# Patient Record
Sex: MALE | Race: OTHER | URBAN - METROPOLITAN AREA
[De-identification: names, ages, dates, MRNs, and addresses within clinical notes are randomized per-mention and may not be internally consistent; named-entity substitution may affect disease eponyms.]

---

## 2017-05-06 ENCOUNTER — EMERGENCY (EMERGENCY)
Facility: HOSPITAL | Age: 77
LOS: 0 days | Discharge: HOME | End: 2017-05-06

## 2017-06-28 DIAGNOSIS — Z79.899 OTHER LONG TERM (CURRENT) DRUG THERAPY: ICD-10-CM

## 2017-06-28 DIAGNOSIS — Z79.84 LONG TERM (CURRENT) USE OF ORAL HYPOGLYCEMIC DRUGS: ICD-10-CM

## 2017-06-28 DIAGNOSIS — J06.9 ACUTE UPPER RESPIRATORY INFECTION, UNSPECIFIED: ICD-10-CM

## 2017-06-28 DIAGNOSIS — E11.9 TYPE 2 DIABETES MELLITUS WITHOUT COMPLICATIONS: ICD-10-CM

## 2017-06-28 DIAGNOSIS — R05 COUGH: ICD-10-CM

## 2017-06-28 DIAGNOSIS — I10 ESSENTIAL (PRIMARY) HYPERTENSION: ICD-10-CM

## 2017-07-09 ENCOUNTER — EMERGENCY (EMERGENCY)
Facility: HOSPITAL | Age: 77
LOS: 0 days | Discharge: HOME | End: 2017-07-09

## 2017-07-09 DIAGNOSIS — Z79.84 LONG TERM (CURRENT) USE OF ORAL HYPOGLYCEMIC DRUGS: ICD-10-CM

## 2017-07-09 DIAGNOSIS — Z79.899 OTHER LONG TERM (CURRENT) DRUG THERAPY: ICD-10-CM

## 2017-07-09 DIAGNOSIS — L02.811 CUTANEOUS ABSCESS OF HEAD [ANY PART, EXCEPT FACE]: ICD-10-CM

## 2017-07-09 DIAGNOSIS — I10 ESSENTIAL (PRIMARY) HYPERTENSION: ICD-10-CM

## 2017-07-09 DIAGNOSIS — L73.9 FOLLICULAR DISORDER, UNSPECIFIED: ICD-10-CM

## 2017-07-09 DIAGNOSIS — E11.9 TYPE 2 DIABETES MELLITUS WITHOUT COMPLICATIONS: ICD-10-CM

## 2017-08-12 ENCOUNTER — EMERGENCY (EMERGENCY)
Facility: HOSPITAL | Age: 77
LOS: 0 days | Discharge: HOME | End: 2017-08-12

## 2017-08-12 DIAGNOSIS — Z79.84 LONG TERM (CURRENT) USE OF ORAL HYPOGLYCEMIC DRUGS: ICD-10-CM

## 2017-08-12 DIAGNOSIS — Z79.899 OTHER LONG TERM (CURRENT) DRUG THERAPY: ICD-10-CM

## 2017-08-12 DIAGNOSIS — L29.9 PRURITUS, UNSPECIFIED: ICD-10-CM

## 2017-08-12 DIAGNOSIS — I10 ESSENTIAL (PRIMARY) HYPERTENSION: ICD-10-CM

## 2017-08-12 DIAGNOSIS — E11.9 TYPE 2 DIABETES MELLITUS WITHOUT COMPLICATIONS: ICD-10-CM

## 2017-10-11 ENCOUNTER — EMERGENCY (EMERGENCY)
Facility: HOSPITAL | Age: 77
LOS: 0 days | Discharge: HOME | End: 2017-10-11

## 2017-10-11 DIAGNOSIS — M25.562 PAIN IN LEFT KNEE: ICD-10-CM

## 2017-10-11 DIAGNOSIS — E11.9 TYPE 2 DIABETES MELLITUS WITHOUT COMPLICATIONS: ICD-10-CM

## 2017-10-11 DIAGNOSIS — Z79.84 LONG TERM (CURRENT) USE OF ORAL HYPOGLYCEMIC DRUGS: ICD-10-CM

## 2017-10-11 DIAGNOSIS — Z79.899 OTHER LONG TERM (CURRENT) DRUG THERAPY: ICD-10-CM

## 2017-10-11 DIAGNOSIS — G89.29 OTHER CHRONIC PAIN: ICD-10-CM

## 2017-10-11 DIAGNOSIS — I10 ESSENTIAL (PRIMARY) HYPERTENSION: ICD-10-CM

## 2018-01-18 ENCOUNTER — EMERGENCY (EMERGENCY)
Facility: HOSPITAL | Age: 78
LOS: 0 days | Discharge: HOME | End: 2018-01-18

## 2018-01-18 DIAGNOSIS — E11.9 TYPE 2 DIABETES MELLITUS WITHOUT COMPLICATIONS: ICD-10-CM

## 2018-01-18 DIAGNOSIS — Z79.84 LONG TERM (CURRENT) USE OF ORAL HYPOGLYCEMIC DRUGS: ICD-10-CM

## 2018-01-18 DIAGNOSIS — Z76.0 ENCOUNTER FOR ISSUE OF REPEAT PRESCRIPTION: ICD-10-CM

## 2018-01-18 DIAGNOSIS — Z79.899 OTHER LONG TERM (CURRENT) DRUG THERAPY: ICD-10-CM

## 2018-01-18 DIAGNOSIS — I10 ESSENTIAL (PRIMARY) HYPERTENSION: ICD-10-CM

## 2018-04-17 ENCOUNTER — EMERGENCY (EMERGENCY)
Facility: HOSPITAL | Age: 78
LOS: 0 days | Discharge: HOME | End: 2018-04-17
Admitting: PHYSICIAN ASSISTANT

## 2018-04-17 VITALS
OXYGEN SATURATION: 97 % | HEART RATE: 69 BPM | DIASTOLIC BLOOD PRESSURE: 76 MMHG | TEMPERATURE: 98 F | SYSTOLIC BLOOD PRESSURE: 160 MMHG | RESPIRATION RATE: 19 BRPM

## 2018-04-17 DIAGNOSIS — M25.511 PAIN IN RIGHT SHOULDER: ICD-10-CM

## 2018-04-17 DIAGNOSIS — M75.81 OTHER SHOULDER LESIONS, RIGHT SHOULDER: ICD-10-CM

## 2018-04-17 DIAGNOSIS — I10 ESSENTIAL (PRIMARY) HYPERTENSION: ICD-10-CM

## 2018-04-17 DIAGNOSIS — E11.9 TYPE 2 DIABETES MELLITUS WITHOUT COMPLICATIONS: ICD-10-CM

## 2018-04-17 RX ORDER — METHOCARBAMOL 500 MG/1
1 TABLET, FILM COATED ORAL
Qty: 30 | Refills: 0 | OUTPATIENT
Start: 2018-04-17 | End: 2018-04-26

## 2018-04-17 RX ORDER — ACETAMINOPHEN 500 MG
975 TABLET ORAL ONCE
Qty: 0 | Refills: 0 | Status: COMPLETED | OUTPATIENT
Start: 2018-04-17 | End: 2018-04-17

## 2018-04-17 RX ORDER — KETOROLAC TROMETHAMINE 30 MG/ML
30 SYRINGE (ML) INJECTION ONCE
Qty: 0 | Refills: 0 | Status: DISCONTINUED | OUTPATIENT
Start: 2018-04-17 | End: 2018-04-17

## 2018-04-17 RX ADMIN — Medication 30 MILLIGRAM(S): at 06:20

## 2018-04-17 RX ADMIN — Medication 975 MILLIGRAM(S): at 06:20

## 2018-04-17 NOTE — ED PROVIDER NOTE - OBJECTIVE STATEMENT
76 yo male hx of HTN/DM present c/o right shoulder and upper arm pain worsening over the past 2 days after he was digging 3 holes with a post digger 3 days ago. patient states he didn't feel much pain at that time and he was able to go to gym to work out. pain worsening last night so he comes to ED for evaluation. + tingling/burning sensation down to right hand. pain worsen with movement. didn't take any meds at home for pain. denies fever/chill/chest pain/sob/abd pain/n/v/d.

## 2018-04-17 NOTE — ED PROVIDER NOTE - MUSCULOSKELETAL, MLM
+ ttp over the anterior shoulder with painful flexion/external rotation over 45 degree. right elbow/wrist and hand with FROM. 2+ radial pulses. right hand n/v intact

## 2018-06-08 ENCOUNTER — EMERGENCY (EMERGENCY)
Facility: HOSPITAL | Age: 78
LOS: 0 days | Discharge: HOME | End: 2018-06-08
Admitting: PHYSICIAN ASSISTANT

## 2018-06-08 VITALS
OXYGEN SATURATION: 99 % | TEMPERATURE: 98 F | DIASTOLIC BLOOD PRESSURE: 83 MMHG | RESPIRATION RATE: 16 BRPM | HEART RATE: 64 BPM | SYSTOLIC BLOOD PRESSURE: 173 MMHG

## 2018-06-08 DIAGNOSIS — R21 RASH AND OTHER NONSPECIFIC SKIN ERUPTION: ICD-10-CM

## 2018-06-08 DIAGNOSIS — Z79.891 LONG TERM (CURRENT) USE OF OPIATE ANALGESIC: ICD-10-CM

## 2018-06-08 DIAGNOSIS — I10 ESSENTIAL (PRIMARY) HYPERTENSION: ICD-10-CM

## 2018-06-08 DIAGNOSIS — Z96.652 PRESENCE OF LEFT ARTIFICIAL KNEE JOINT: Chronic | ICD-10-CM

## 2018-06-08 DIAGNOSIS — Z79.1 LONG TERM (CURRENT) USE OF NON-STEROIDAL ANTI-INFLAMMATORIES (NSAID): ICD-10-CM

## 2018-06-08 DIAGNOSIS — L29.8 OTHER PRURITUS: ICD-10-CM

## 2018-06-08 DIAGNOSIS — Z79.84 LONG TERM (CURRENT) USE OF ORAL HYPOGLYCEMIC DRUGS: ICD-10-CM

## 2018-06-08 DIAGNOSIS — Z79.899 OTHER LONG TERM (CURRENT) DRUG THERAPY: ICD-10-CM

## 2018-06-08 DIAGNOSIS — E11.9 TYPE 2 DIABETES MELLITUS WITHOUT COMPLICATIONS: ICD-10-CM

## 2018-06-08 DIAGNOSIS — Z96.652 PRESENCE OF LEFT ARTIFICIAL KNEE JOINT: ICD-10-CM

## 2018-06-08 NOTE — ED PROVIDER NOTE - PROGRESS NOTE DETAILS
Patient states he has taken steroids before even with being a diabetic.  Patient understands the risk of steroid use and being a diabetic.  Patient is aware he needs to monitor his BS daily.  Patient aware to follow up with PMD on Monday.  Patient aware to hold use of steroid if blood sugar is high. Patient advised to return to the ED if sugar is elevated, if rash worsens, or if any new, worsening, or concerning symptoms. Patients advised to take over the counter Benadryl if rash worsens or has difficulty breathing.

## 2018-06-08 NOTE — ED PROVIDER NOTE - PHYSICAL EXAMINATION
GENERAL: NAD, well-developed  ENT: Airway clear. No pharyngeal erythema or exudates. Uvula midline. No tongue swelling.  CHEST/LUNG: Clear to auscultation bilaterally; No wheeze, rhonchi, or rales  CARDIAC: Normal S1 and S2  EXTREMITIES:  Radial pulses present B/L.  PSYCH: AAOx3, cooperative, appropriate   SKIN: + multiple, macular, erythematous insect bites located on upper and lower extremities . GENERAL: NAD, well-developed  ENT: Airway clear. No pharyngeal erythema or exudates. Uvula midline. No tongue swelling.  CHEST/LUNG: Clear to auscultation bilaterally; No wheeze, rhonchi, or rales  CARDIAC: Normal S1 and S2  EXTREMITIES:  Radial pulses present B/L.  PSYCH: AAOx3, cooperative, appropriate   SKIN: + multiple, small, macular, erythematous insect bites located on upper and lower extremities. No visible excoriations in finger webs. No target signs. No signs of infection

## 2018-06-08 NOTE — ED PROVIDER NOTE - NS ED ROS FT
CONSTITUTIONAL: No weakness, fevers, or chills  NECK: No pain or stiffness  RESPIRATORY: No shortness of breath  CARDIOVASCULAR: No chest pain or palpitations  GASTROINTESTINAL: No abdominal or epigastric pain. No nausea or vomiting  NEUROLOGICAL: No numbness or weakness  SKIN: + insect bites on arms and legs

## 2018-06-08 NOTE — ED PROVIDER NOTE - OBJECTIVE STATEMENT
76 yo male with PMH of HTN, DM presents to the ED c/o pruritic, insect bites from when he was mowing his lawn 4 days ago. Patient denies fever, chills, exposure to poison ivy, or SOB.

## 2018-06-08 NOTE — ED PROVIDER NOTE - DIAGNOSIS COUNSELING, MDM
conducted a detailed discussion... Patient to be discharged from ED.  Verbal instructions given, including instructions to return to ED immediately for any new, worsening, or concerning symptoms. Patient endorsed understanding. Written discharge instructions additionally given, including follow-up plan.

## 2018-07-23 ENCOUNTER — EMERGENCY (EMERGENCY)
Facility: HOSPITAL | Age: 78
LOS: 0 days | Discharge: HOME | End: 2018-07-24
Attending: EMERGENCY MEDICINE | Admitting: EMERGENCY MEDICINE

## 2018-07-23 DIAGNOSIS — I10 ESSENTIAL (PRIMARY) HYPERTENSION: ICD-10-CM

## 2018-07-23 DIAGNOSIS — Z79.52 LONG TERM (CURRENT) USE OF SYSTEMIC STEROIDS: ICD-10-CM

## 2018-07-23 DIAGNOSIS — Z79.01 LONG TERM (CURRENT) USE OF ANTICOAGULANTS: ICD-10-CM

## 2018-07-23 DIAGNOSIS — R21 RASH AND OTHER NONSPECIFIC SKIN ERUPTION: ICD-10-CM

## 2018-07-23 DIAGNOSIS — Z79.84 LONG TERM (CURRENT) USE OF ORAL HYPOGLYCEMIC DRUGS: ICD-10-CM

## 2018-07-23 DIAGNOSIS — E11.9 TYPE 2 DIABETES MELLITUS WITHOUT COMPLICATIONS: ICD-10-CM

## 2018-07-23 DIAGNOSIS — Z96.652 PRESENCE OF LEFT ARTIFICIAL KNEE JOINT: Chronic | ICD-10-CM

## 2018-07-23 DIAGNOSIS — L29.9 PRURITUS, UNSPECIFIED: ICD-10-CM

## 2018-07-23 DIAGNOSIS — Z79.899 OTHER LONG TERM (CURRENT) DRUG THERAPY: ICD-10-CM

## 2018-07-23 DIAGNOSIS — L53.9 ERYTHEMATOUS CONDITION, UNSPECIFIED: ICD-10-CM

## 2018-07-24 VITALS
DIASTOLIC BLOOD PRESSURE: 72 MMHG | TEMPERATURE: 98 F | SYSTOLIC BLOOD PRESSURE: 143 MMHG | RESPIRATION RATE: 18 BRPM | OXYGEN SATURATION: 96 % | HEART RATE: 61 BPM

## 2018-07-24 PROBLEM — E11.9 TYPE 2 DIABETES MELLITUS WITHOUT COMPLICATIONS: Chronic | Status: ACTIVE | Noted: 2018-04-17

## 2018-07-24 PROBLEM — I10 ESSENTIAL (PRIMARY) HYPERTENSION: Chronic | Status: ACTIVE | Noted: 2018-04-17

## 2018-07-24 RX ORDER — PERMETHRIN CREAM 5% W/W 50 MG/G
2 CREAM TOPICAL
Qty: 1 | Refills: 0 | OUTPATIENT
Start: 2018-07-24 | End: 2018-07-24

## 2018-07-24 NOTE — ED PROVIDER NOTE - OBJECTIVE STATEMENT
77 yo M reports pruritic rash with few scattered papular rashes with excoriations. Pt. reports that he noticed pruritic isolated papules 3 d ago, pt. applied hydrocortisone to the area with no relief. Additionally pt. tried taking benadryl PO that provided no relief. Denies fevers, riggors, N/V, CP, SOB, abd. pain, desquamating rash.    I have reviewed available current nursing and previous documentation of past medical, surgical, family, and/or social history.

## 2018-07-24 NOTE — ED PROVIDER NOTE - PHYSICAL EXAMINATION
Physical Exam    Vital Signs: I have reviewed the initial vital signs.  Constitutional: well-nourished, appears stated age, no acute distress  Eyes: PERRLA, EOM intact, no conjunctival injection, and symmetrical lids.  ENT: Neck supple with no adenopathy, moist MM, no involvement of MM.  Respiratory: unlabored respiratory effort, clear to auscultation bilaterally  Gastrointestinal: soft, non-tender abdomen, no pulsatile mass  Musculoskeletal: supple neck, no lower extremity edema  Integumentary: Discrete and isolated papules with multiple surrounding excoriation on extremities and face. Warm, dry, no rash.

## 2018-07-24 NOTE — ED PROVIDER NOTE - ATTENDING CONTRIBUTION TO CARE
I personally evaluated the patient. I reviewed the Resident’s or Physician Assistant’s note (as assigned above), and agree with the findings and plan except as documented in my note.  79 yo M with DM presents to Ed with pruritus for 3 days over arms and face. No new detergents. NO change in meals. No prior history of rash. No close contacts with similar symptoms. No abdominal pain. No change in color of skin or urine PE: well-appearing, non toxic, + multiple areas of erythema on bilateral forearms (bug bite appearing), no scleral icterus, no sublingual jaundice, no oral or mucosal involvement

## 2018-07-24 NOTE — ED PROVIDER NOTE - MEDICAL DECISION MAKING DETAILS
Possible reaction to bug bites, permethrin given and instructed to follow up with PMD and dermatology

## 2018-07-24 NOTE — ED PROVIDER NOTE - NS ED ROS FT
Review of Systems    Constitutional: (-) fever (-) weakness   Eyes: (-) change in vision (-) photophobia (-) eye pain  ENT: (-) sore throat (-) nasal discharge  Cardiovascular: (-) chest pain  (-) palpitations  Respiratory: (-) SOB (-) cough   GI: (-) abdominal pain (-) N/V   Integumentary: (-) redness

## 2018-12-09 ENCOUNTER — EMERGENCY (EMERGENCY)
Facility: HOSPITAL | Age: 78
LOS: 0 days | Discharge: LEFT BEFORE DISCHARGE | End: 2018-12-09
Admitting: EMERGENCY MEDICINE

## 2018-12-09 DIAGNOSIS — J18.9 PNEUMONIA, UNSPECIFIED ORGANISM: ICD-10-CM

## 2018-12-09 DIAGNOSIS — R11.0 NAUSEA: ICD-10-CM

## 2018-12-09 DIAGNOSIS — I10 ESSENTIAL (PRIMARY) HYPERTENSION: ICD-10-CM

## 2018-12-09 DIAGNOSIS — J02.9 ACUTE PHARYNGITIS, UNSPECIFIED: ICD-10-CM

## 2018-12-09 DIAGNOSIS — Z96.652 PRESENCE OF LEFT ARTIFICIAL KNEE JOINT: Chronic | ICD-10-CM

## 2018-12-09 DIAGNOSIS — E11.9 TYPE 2 DIABETES MELLITUS WITHOUT COMPLICATIONS: ICD-10-CM

## 2018-12-09 DIAGNOSIS — R51 HEADACHE: ICD-10-CM

## 2018-12-13 ENCOUNTER — TRANSCRIPTION ENCOUNTER (OUTPATIENT)
Age: 78
End: 2018-12-13

## 2018-12-13 ENCOUNTER — INPATIENT (INPATIENT)
Facility: HOSPITAL | Age: 78
LOS: 0 days | Discharge: HOME | End: 2018-12-13
Attending: HOSPITALIST | Admitting: HOSPITALIST

## 2018-12-13 VITALS
SYSTOLIC BLOOD PRESSURE: 124 MMHG | TEMPERATURE: 98 F | HEART RATE: 66 BPM | RESPIRATION RATE: 18 BRPM | DIASTOLIC BLOOD PRESSURE: 68 MMHG | OXYGEN SATURATION: 97 %

## 2018-12-13 VITALS
OXYGEN SATURATION: 97 % | DIASTOLIC BLOOD PRESSURE: 77 MMHG | RESPIRATION RATE: 20 BRPM | SYSTOLIC BLOOD PRESSURE: 146 MMHG | HEIGHT: 66 IN | WEIGHT: 160.06 LBS | HEART RATE: 69 BPM | TEMPERATURE: 98 F

## 2018-12-13 DIAGNOSIS — Z96.652 PRESENCE OF LEFT ARTIFICIAL KNEE JOINT: Chronic | ICD-10-CM

## 2018-12-13 LAB
ALBUMIN SERPL ELPH-MCNC: 3.8 G/DL — SIGNIFICANT CHANGE UP (ref 3.5–5.2)
ALP SERPL-CCNC: 62 U/L — SIGNIFICANT CHANGE UP (ref 30–115)
ALT FLD-CCNC: 16 U/L — SIGNIFICANT CHANGE UP (ref 0–41)
ANION GAP SERPL CALC-SCNC: 17 MMOL/L — HIGH (ref 7–14)
AST SERPL-CCNC: 14 U/L — SIGNIFICANT CHANGE UP (ref 0–41)
BASOPHILS # BLD AUTO: 0.06 K/UL — SIGNIFICANT CHANGE UP (ref 0–0.2)
BASOPHILS NFR BLD AUTO: 0.7 % — SIGNIFICANT CHANGE UP (ref 0–1)
BILIRUB SERPL-MCNC: 0.2 MG/DL — SIGNIFICANT CHANGE UP (ref 0.2–1.2)
BUN SERPL-MCNC: 16 MG/DL — SIGNIFICANT CHANGE UP (ref 10–20)
CALCIUM SERPL-MCNC: 9.4 MG/DL — SIGNIFICANT CHANGE UP (ref 8.5–10.1)
CHLORIDE SERPL-SCNC: 100 MMOL/L — SIGNIFICANT CHANGE UP (ref 98–110)
CO2 SERPL-SCNC: 22 MMOL/L — SIGNIFICANT CHANGE UP (ref 17–32)
CREAT SERPL-MCNC: 0.8 MG/DL — SIGNIFICANT CHANGE UP (ref 0.7–1.5)
EOSINOPHIL # BLD AUTO: 0.36 K/UL — SIGNIFICANT CHANGE UP (ref 0–0.7)
EOSINOPHIL NFR BLD AUTO: 4.1 % — SIGNIFICANT CHANGE UP (ref 0–8)
GLUCOSE SERPL-MCNC: 227 MG/DL — HIGH (ref 70–99)
HCT VFR BLD CALC: 41.2 % — LOW (ref 42–52)
HGB BLD-MCNC: 13.9 G/DL — LOW (ref 14–18)
IMM GRANULOCYTES NFR BLD AUTO: 0.7 % — HIGH (ref 0.1–0.3)
LACTATE BLDV-MCNC: 2.2 MMOL/L — HIGH (ref 0.5–1.6)
LACTATE SERPL-SCNC: 1.5 MMOL/L — SIGNIFICANT CHANGE UP (ref 0.5–2.2)
LACTATE SERPL-SCNC: 2.6 MMOL/L — HIGH (ref 0.5–2.2)
LYMPHOCYTES # BLD AUTO: 2.09 K/UL — SIGNIFICANT CHANGE UP (ref 1.2–3.4)
LYMPHOCYTES # BLD AUTO: 23.6 % — SIGNIFICANT CHANGE UP (ref 20.5–51.1)
MAGNESIUM SERPL-MCNC: 1.7 MG/DL — LOW (ref 1.8–2.4)
MCHC RBC-ENTMCNC: 27.6 PG — SIGNIFICANT CHANGE UP (ref 27–31)
MCHC RBC-ENTMCNC: 33.7 G/DL — SIGNIFICANT CHANGE UP (ref 32–37)
MCV RBC AUTO: 81.7 FL — SIGNIFICANT CHANGE UP (ref 80–94)
MONOCYTES # BLD AUTO: 0.67 K/UL — HIGH (ref 0.1–0.6)
MONOCYTES NFR BLD AUTO: 7.6 % — SIGNIFICANT CHANGE UP (ref 1.7–9.3)
NEUTROPHILS # BLD AUTO: 5.63 K/UL — SIGNIFICANT CHANGE UP (ref 1.4–6.5)
NEUTROPHILS NFR BLD AUTO: 63.3 % — SIGNIFICANT CHANGE UP (ref 42.2–75.2)
NRBC # BLD: 0 /100 WBCS — SIGNIFICANT CHANGE UP (ref 0–0)
PLATELET # BLD AUTO: 368 K/UL — SIGNIFICANT CHANGE UP (ref 130–400)
POTASSIUM SERPL-MCNC: 4.1 MMOL/L — SIGNIFICANT CHANGE UP (ref 3.5–5)
POTASSIUM SERPL-SCNC: 4.1 MMOL/L — SIGNIFICANT CHANGE UP (ref 3.5–5)
PROT SERPL-MCNC: 6.8 G/DL — SIGNIFICANT CHANGE UP (ref 6–8)
RBC # BLD: 5.04 M/UL — SIGNIFICANT CHANGE UP (ref 4.7–6.1)
RBC # FLD: 12.4 % — SIGNIFICANT CHANGE UP (ref 11.5–14.5)
SODIUM SERPL-SCNC: 139 MMOL/L — SIGNIFICANT CHANGE UP (ref 135–146)
WBC # BLD: 8.87 K/UL — SIGNIFICANT CHANGE UP (ref 4.8–10.8)
WBC # FLD AUTO: 8.87 K/UL — SIGNIFICANT CHANGE UP (ref 4.8–10.8)

## 2018-12-13 RX ORDER — METFORMIN HYDROCHLORIDE 850 MG/1
0 TABLET ORAL
Qty: 0 | Refills: 0 | COMMUNITY

## 2018-12-13 RX ORDER — OXYCODONE HYDROCHLORIDE 5 MG/1
0 TABLET ORAL
Qty: 0 | Refills: 0 | COMMUNITY

## 2018-12-13 RX ORDER — AZITHROMYCIN 500 MG/1
500 TABLET, FILM COATED ORAL DAILY
Qty: 0 | Refills: 0 | Status: DISCONTINUED | OUTPATIENT
Start: 2018-12-14 | End: 2018-12-13

## 2018-12-13 RX ORDER — AMLODIPINE BESYLATE 2.5 MG/1
10 TABLET ORAL DAILY
Qty: 0 | Refills: 0 | Status: DISCONTINUED | OUTPATIENT
Start: 2018-12-13 | End: 2018-12-13

## 2018-12-13 RX ORDER — MAGNESIUM SULFATE 500 MG/ML
2 VIAL (ML) INJECTION ONCE
Qty: 0 | Refills: 0 | Status: COMPLETED | OUTPATIENT
Start: 2018-12-13 | End: 2018-12-13

## 2018-12-13 RX ORDER — CEFTRIAXONE 500 MG/1
1 INJECTION, POWDER, FOR SOLUTION INTRAMUSCULAR; INTRAVENOUS EVERY 24 HOURS
Qty: 0 | Refills: 0 | Status: DISCONTINUED | OUTPATIENT
Start: 2018-12-14 | End: 2018-12-13

## 2018-12-13 RX ORDER — CHLORHEXIDINE GLUCONATE 213 G/1000ML
1 SOLUTION TOPICAL
Qty: 0 | Refills: 0 | Status: DISCONTINUED | OUTPATIENT
Start: 2018-12-13 | End: 2018-12-13

## 2018-12-13 RX ORDER — AZITHROMYCIN 500 MG/1
500 TABLET, FILM COATED ORAL ONCE
Qty: 0 | Refills: 0 | Status: COMPLETED | OUTPATIENT
Start: 2018-12-13 | End: 2018-12-13

## 2018-12-13 RX ORDER — GABAPENTIN 400 MG/1
1 CAPSULE ORAL
Qty: 0 | Refills: 0 | COMMUNITY

## 2018-12-13 RX ORDER — ENOXAPARIN SODIUM 100 MG/ML
40 INJECTION SUBCUTANEOUS DAILY
Qty: 0 | Refills: 0 | Status: DISCONTINUED | OUTPATIENT
Start: 2018-12-13 | End: 2018-12-13

## 2018-12-13 RX ORDER — AMLODIPINE BESYLATE 2.5 MG/1
0 TABLET ORAL
Qty: 0 | Refills: 0 | COMMUNITY

## 2018-12-13 RX ORDER — SODIUM CHLORIDE 9 MG/ML
1000 INJECTION INTRAMUSCULAR; INTRAVENOUS; SUBCUTANEOUS ONCE
Qty: 0 | Refills: 0 | Status: COMPLETED | OUTPATIENT
Start: 2018-12-13 | End: 2018-12-13

## 2018-12-13 RX ORDER — CEFTRIAXONE 500 MG/1
1 INJECTION, POWDER, FOR SOLUTION INTRAMUSCULAR; INTRAVENOUS EVERY 24 HOURS
Qty: 0 | Refills: 0 | Status: DISCONTINUED | OUTPATIENT
Start: 2018-12-13 | End: 2018-12-13

## 2018-12-13 RX ORDER — GABAPENTIN 400 MG/1
600 CAPSULE ORAL DAILY
Qty: 0 | Refills: 0 | Status: DISCONTINUED | OUTPATIENT
Start: 2018-12-13 | End: 2018-12-13

## 2018-12-13 RX ADMIN — CEFTRIAXONE 100 GRAM(S): 500 INJECTION, POWDER, FOR SOLUTION INTRAMUSCULAR; INTRAVENOUS at 06:55

## 2018-12-13 RX ADMIN — ENOXAPARIN SODIUM 40 MILLIGRAM(S): 100 INJECTION SUBCUTANEOUS at 12:45

## 2018-12-13 RX ADMIN — AMLODIPINE BESYLATE 10 MILLIGRAM(S): 2.5 TABLET ORAL at 14:35

## 2018-12-13 RX ADMIN — GABAPENTIN 600 MILLIGRAM(S): 400 CAPSULE ORAL at 12:45

## 2018-12-13 RX ADMIN — AZITHROMYCIN 255 MILLIGRAM(S): 500 TABLET, FILM COATED ORAL at 09:13

## 2018-12-13 RX ADMIN — Medication 50 GRAM(S): at 14:17

## 2018-12-13 RX ADMIN — SODIUM CHLORIDE 250 MILLILITER(S): 9 INJECTION INTRAMUSCULAR; INTRAVENOUS; SUBCUTANEOUS at 14:16

## 2018-12-13 RX ADMIN — Medication 20 MILLIGRAM(S): at 14:35

## 2018-12-13 NOTE — H&P ADULT - NSHPLABSRESULTS_GEN_ALL_CORE
Allergies    No Known Allergies    Intolerances        T(F): 97.2 (12-13-18 @ 07:53), Max: 97.8 (12-13-18 @ 02:33)  HR: 62 (12-13-18 @ 07:53) (62 - 69)  BP: 132/67 (12-13-18 @ 07:53) (132/67 - 146/77)  BP(mean): --  RR: 18 (12-13-18 @ 07:53) (18 - 20)  SpO2: 97% (12-13-18 @ 07:53) (97% - 97%)    12-13    139  |  100  |  16  ----------------------------<  227<H>  4.1   |  22  |  0.8    Ca    9.4      13 Dec 2018 03:24  Mg     1.7     12-13    TPro  6.8  /  Alb  3.8  /  TBili  0.2  /  DBili  x   /  AST  14  /  ALT  16  /  AlkPhos  62  12-13                            13.9   8.87  )-----------( 368      ( 13 Dec 2018 03:24 )             41.2       Lactate Trend  12-13 @ 03:24 Lactate:2.6   12-09 @ 01:52 Lactate:2.2             CAPILLARY BLOOD GLUCOSE          < from: Xray Chest 2 Views PA/Lat (12.13.18 @ 04:35) >    Opacities in the right hilar and right upper/midlung zones. Findings may   be infectious/inflammatory in etiology however short interval follow-up   after treatment is recommended to demonstrate resolution and exclude   underlying neoplasm.    < end of copied text >

## 2018-12-13 NOTE — ED PROVIDER NOTE - PHYSICAL EXAMINATION
CONST: Well appearing in NAD  EYES: PERRL, EOMI, Sclera and conjunctiva clear.   ENT: No nasal discharge. Oropharynx normal appearing, no erythema or exudates. No abscess or swelling. Uvula midline. No temporal artery or mastoid tenderness.  NECK: Non-tender, no meningeal signs. normal ROM. supple   CARD: Normal S1 S2; Normal rate and rhythm  RESP: B/L faint crakles in the base of the lung fields. No distress  GI: Soft, non-tender, non-distended. no cva tenderness. normal BS  MS: Normal ROM in all extremities. No midline spinal tenderness. pulses 2 +. no calf tenderness or swelling  SKIN: Warm, dry, no acute rashes. Good turgor  NEURO: A&Ox3, No focal deficits. Strength 5/5 with no sensory deficits.

## 2018-12-13 NOTE — ED POST DISCHARGE NOTE - REASON FOR FOLLOW-UP
Other NO CALL BACK NEED. PNA IS BEING TREATED AND PATIENT HAS F/U. WAS SUPPOSED TO BE ADMITTED, BUT CHOSE OUTPATIENT TREATMENT

## 2018-12-13 NOTE — DISCHARGE NOTE ADULT - HOSPITAL COURSE
79 yo M with a PMH of HTN, DM, DLD presented for the evaluation of subjective fevers, sore throat, rhinorrhea sinus congestion and nonproductive cough. Patient was seen at [facility] on 12/8 dx with pneumonia and discharged with Levaquin. He states that his symptoms did not improve and he developed chills. This prompted him to come back to the ED for further evaluation. Flu was negative. Rapid RVP was negative. Patient was treated for bronchitis/sinusitis. No pneumonia was presented. Patient to be discharged. Can compete course of Levaquin offered on previous visit.

## 2018-12-13 NOTE — ED PROVIDER NOTE - OBJECTIVE STATEMENT
77yo M PMH of HTN and DM presents with cough. Pt states he was here on Saturday, was Dx with PNA and given the choice of being treated as inpatient or outpatient. He chose outpatient. He was given 1dose of Levoquin in the hospital and presecribed a course to continue outpatient. He is here today because after 3 days of abx he has no resolve of his sxs. Pt sxs are cough, fever and congestion. Denies HA, weakness, SOB, CP, N/V/D/C

## 2018-12-13 NOTE — ED PROVIDER NOTE - NS ED ROS FT
Constitutional: (+) fever  Eyes/ENT: (-) blurry vision, (-) epistaxis  Cardiovascular: (-) chest pain, (-) syncope  Respiratory: (+) cough, (-) shortness of breath  Gastrointestinal: (-) vomiting, (-) diarrhea  Musculoskeletal: (-) neck pain, (-) back pain, (-) joint pain  Integumentary: (-) rash, (-) edema  Neurological: (-) headache, (-) altered mental status  Psychiatric: (-) hallucinations  Allergic/Immunologic: (-) pruritus

## 2018-12-13 NOTE — H&P ADULT - ASSESSMENT
#pneumonia (right upper/midlung opacities)  -admit to medicine  -azithromycin + rocephin  - blood cx, sputum culture  flu and rvp negative      #DM  insulin lispro + lantus  monitor FS    #HTN  c/w enalapril + amlodipine    #hypomagnesemia  - replete, monitored    #elevated lactic acid- ?etiology, possibly from metformin vs hypoxia at home (no documented hypoxia in hospital)  -will give 1 liter IVF and repeat in PM    DVT ppx  dash+ carb consistent diet

## 2018-12-13 NOTE — ED PROVIDER NOTE - MEDICAL DECISION MAKING DETAILS
79 yo M with h/o HTN, DM p/w cough and fever. Pt was seen in ED four days ago and diagnosed with pneumonia and d/c'ed home with levaquin. Pt returns with continued sx and fever. Labs stable. Pt noted with b/l crackles, R>L, consistent with cxr. Pt admitted for IV abx for failed outpatient management

## 2018-12-13 NOTE — DISCHARGE NOTE ADULT - OTHER SIGNIFICANT FINDINGS
Xray Chest 2 Views PA/Lat (12.13.18 @ 04:35)     EXAM:  XR CHEST PA LAT 2V          PROCEDURE DATE:  12/13/2018      INTERPRETATION:  Clinical History / Reason for exam: Shortness of breath.    Comparison : Chest radiograph of 12/9/2018.    Technique/Positioning: Frontal view of the chest.    Findings:    Support devices: None.    Cardiac/mediastinum/hilum: Tortuous aorta.    Lung parenchyma/Pleura: Nodular opacity in the right upper/midlung zone.   Right perihilar opacity.    Skeleton/soft tissues: Degenerative change in the shoulders and spine.    Impression:     Opacities in the right hilar and right upper/midlung zones. Findings may   be infectious/inflammatory in etiology however short interval follow-up   after treatment is recommended to demonstrate resolution and exclude   underlying neoplasm.

## 2018-12-13 NOTE — ED ADULT NURSE NOTE - OBJECTIVE STATEMENT
pt aox4; dx with pna on Saturday; pt did not want to stay as admit and was d/c with levaquin.  Pt returns to ed for unresolved symptoms of cough, fever.  IV place, labs sent. Medications administered as per order. Will continue to monitor/assess

## 2018-12-13 NOTE — DISCHARGE NOTE ADULT - PATIENT PORTAL LINK FT
You can access the [facility] Patient Portal, offered by [facility], by registering with the following website: [email or web address]

## 2018-12-13 NOTE — H&P ADULT - FAMILY HISTORY
No pertinent family history in first degree relatives, states that he has a FH of cancer (does not know what type) in his father and mother both  at the age of 65 yo

## 2018-12-13 NOTE — DISCHARGE NOTE ADULT - CARE PLAN
Principal Discharge DX:	Bronchitis  Goal:	Continued treatment and monitoring  Assessment and plan of treatment:	Completed antibiotics given to you by other provider. Please continue all medications as prescribed. If symptoms worsen or reoccur, please call 911 or report to the nearest Emergency Medicine Department.

## 2018-12-13 NOTE — DISCHARGE NOTE ADULT - ADDITIONAL INSTRUCTIONS
Please follow up with your primary care doctor or the recommended primary care phsyician (Dr. Sinclair)

## 2018-12-13 NOTE — H&P ADULT - NSHPPHYSICALEXAM_GEN_ALL_CORE
PHYSICAL EXAM:  GENERAL: NAD laying in bed flat, speaks in full sentences, no signs of respiratory distress  HEAD:  Atraumatic, Normocephalic  EYES: EOMI, PERRLA, conjunctiva and sclera clear  NECK: Supple, No JVD  CHEST/LUNG: LLL crackles; No accessory muscles used  HEART: Regular rate and rhythm; No murmurs;   ABDOMEN: Soft, Nontender, Nondistended; Bowel sounds present; No guarding  EXTREMITIES:  No cyanosis or edema  PSYCH: AAOx3  NEUROLOGY: non-focal  SKIN: No rashes or lesions

## 2018-12-13 NOTE — DISCHARGE NOTE ADULT - MEDICATION SUMMARY - MEDICATIONS TO TAKE
I will START or STAY ON the medications listed below when I get home from the hospital:    Percocet 5/325 oral tablet  -- 1 tab(s) by mouth every 6 hours, As Needed  -- Indication: For Pain Medications    enalapril 20 mg oral tablet  -- 1 tab(s) by mouth once a day  -- Indication: For Hypertension    gabapentin 600 mg oral tablet  -- 1 tab(s) by mouth once a day  -- Indication: For Neuropathic pain    Januvia 100 mg oral tablet  -- 1 tab(s) by mouth once a day  -- Indication: For Diabetes    metFORMIN 1000 mg oral tablet  -- 1 tab(s) by mouth once a day  -- Indication: For Diabetes    glipiZIDE 10 mg oral tablet  -- 1 tab(s) by mouth once a day  -- Indication: For Diabetes    amLODIPine 10 mg oral tablet  -- 1 tab(s) by mouth once a day  -- Indication: For HTN

## 2018-12-13 NOTE — H&P ADULT - ATTENDING COMMENTS
Patient seen and examined independently. I agree with the resident's note, physical exam, and plan except as below.  Vital Signs Last 24 Hrs  T(C): 36.2 (13 Dec 2018 07:53), Max: 36.6 (13 Dec 2018 02:33)  T(F): 97.2 (13 Dec 2018 07:53), Max: 97.8 (13 Dec 2018 02:33)  HR: 62 (13 Dec 2018 07:53) (62 - 69)  BP: 132/67 (13 Dec 2018 07:53) (132/67 - 146/77)  BP(mean): --  RR: 18 (13 Dec 2018 07:53) (18 - 20)  SpO2: 97% (13 Dec 2018 07:53) (97% - 97%)  PE  nad  [id number]  ctabl - no wheeze, rhochi  soft ntnd+bs  no cce    #bronchitis/sinusitis - NO PNA - pt states that he initially came to ER with sinus pain, cough, sore throat, insomnia - was sent home with levaquin. HE returned to ER bec he still did not feel well -low grade temp 99, still could not sleep , nonproductive cough

## 2018-12-13 NOTE — DISCHARGE NOTE ADULT - CARE PROVIDER_API CALL
Gigi Singleton (MD), [address] [place], [place] [address]  [address] [place] [address]  Phone: [phone]  Fax: [phone]

## 2018-12-13 NOTE — DISCHARGE NOTE ADULT - PLAN OF CARE
Continued treatment and monitoring Completed antibiotics given to you by other provider. Please continue all medications as prescribed. If symptoms worsen or reoccur, please call 911 or report to the nearest Emergency Medicine Department.

## 2018-12-13 NOTE — ED PROVIDER NOTE - ATTENDING CONTRIBUTION TO CARE
77 yo M with h/o HTN, DM p/w cough and fever. Pt was seen in ED four days ago and diagnosed with pneumonia and d/c'ed home with levaquin. Pt returns now because he has been taking abx x 3 days and still not feeling better. Pt continues to have a cough, body aches, chills and low grade fever at home. Pt also states that he has been having hyperglycemia at home since starting abx with FS 400s today. Pt denies any ha, neck pain, dizziness, chest pain, sob, abdominal pain, n/v/d, No sick contacts or recent travel. Pt did get the flu shot this year. a/p: vss, pt appears in nad, nontoxic appearing, norm cardiac exam, lungs with b/l crackles, L>R, abd is soft and nt, no pedal edema, will check labs, ekg, cxr and reassess

## 2018-12-18 DIAGNOSIS — E83.42 HYPOMAGNESEMIA: ICD-10-CM

## 2018-12-18 DIAGNOSIS — E78.5 HYPERLIPIDEMIA, UNSPECIFIED: ICD-10-CM

## 2018-12-18 DIAGNOSIS — G47.00 INSOMNIA, UNSPECIFIED: ICD-10-CM

## 2018-12-18 DIAGNOSIS — T38.3X5A ADVERSE EFFECT OF INSULIN AND ORAL HYPOGLYCEMIC [ANTIDIABETIC] DRUGS, INITIAL ENCOUNTER: ICD-10-CM

## 2018-12-18 DIAGNOSIS — Z87.01 PERSONAL HISTORY OF PNEUMONIA (RECURRENT): ICD-10-CM

## 2018-12-18 DIAGNOSIS — J40 BRONCHITIS, NOT SPECIFIED AS ACUTE OR CHRONIC: ICD-10-CM

## 2018-12-18 DIAGNOSIS — R74.0 NONSPECIFIC ELEVATION OF LEVELS OF TRANSAMINASE AND LACTIC ACID DEHYDROGENASE [LDH]: ICD-10-CM

## 2018-12-18 DIAGNOSIS — R50.9 FEVER, UNSPECIFIED: ICD-10-CM

## 2018-12-18 DIAGNOSIS — E11.9 TYPE 2 DIABETES MELLITUS WITHOUT COMPLICATIONS: ICD-10-CM

## 2018-12-18 DIAGNOSIS — I10 ESSENTIAL (PRIMARY) HYPERTENSION: ICD-10-CM

## 2018-12-18 DIAGNOSIS — Z79.84 LONG TERM (CURRENT) USE OF ORAL HYPOGLYCEMIC DRUGS: ICD-10-CM

## 2018-12-18 DIAGNOSIS — J32.9 CHRONIC SINUSITIS, UNSPECIFIED: ICD-10-CM

## 2018-12-18 LAB
CULTURE RESULTS: SIGNIFICANT CHANGE UP
SPECIMEN SOURCE: SIGNIFICANT CHANGE UP

## 2018-12-19 LAB
CULTURE RESULTS: SIGNIFICANT CHANGE UP
SPECIMEN SOURCE: SIGNIFICANT CHANGE UP

## 2019-03-18 ENCOUNTER — EMERGENCY (EMERGENCY)
Facility: HOSPITAL | Age: 79
LOS: 0 days | Discharge: HOME | End: 2019-03-18
Attending: EMERGENCY MEDICINE | Admitting: EMERGENCY MEDICINE

## 2019-03-18 VITALS
HEART RATE: 74 BPM | OXYGEN SATURATION: 98 % | TEMPERATURE: 97 F | DIASTOLIC BLOOD PRESSURE: 86 MMHG | SYSTOLIC BLOOD PRESSURE: 151 MMHG | RESPIRATION RATE: 20 BRPM

## 2019-03-18 DIAGNOSIS — J18.9 PNEUMONIA, UNSPECIFIED ORGANISM: ICD-10-CM

## 2019-03-18 DIAGNOSIS — R05 COUGH: ICD-10-CM

## 2019-03-18 DIAGNOSIS — I10 ESSENTIAL (PRIMARY) HYPERTENSION: ICD-10-CM

## 2019-03-18 DIAGNOSIS — E78.5 HYPERLIPIDEMIA, UNSPECIFIED: ICD-10-CM

## 2019-03-18 DIAGNOSIS — Z79.899 OTHER LONG TERM (CURRENT) DRUG THERAPY: ICD-10-CM

## 2019-03-18 DIAGNOSIS — Z79.891 LONG TERM (CURRENT) USE OF OPIATE ANALGESIC: ICD-10-CM

## 2019-03-18 DIAGNOSIS — E11.9 TYPE 2 DIABETES MELLITUS WITHOUT COMPLICATIONS: ICD-10-CM

## 2019-03-18 DIAGNOSIS — Z96.652 PRESENCE OF LEFT ARTIFICIAL KNEE JOINT: Chronic | ICD-10-CM

## 2019-03-18 DIAGNOSIS — Z79.84 LONG TERM (CURRENT) USE OF ORAL HYPOGLYCEMIC DRUGS: ICD-10-CM

## 2019-03-18 NOTE — ED PROVIDER NOTE - CLINICAL SUMMARY MEDICAL DECISION MAKING FREE TEXT BOX
cough xray with infiltrate on right, well appearing, nml vitals, ekg with acute ischemia and patient 8 days with symptoms so unlikely cardiac will treat with abx for pna.

## 2019-03-18 NOTE — ED PROVIDER NOTE - ATTENDING CONTRIBUTION TO CARE
cough for 8 days, with subjective fever and chest pain that only occurs with coughing, no sob, no leg swelling, no hemoptysis, on exam well appearing lungs and heart nml, ext nml. plan is xray and ekg,

## 2019-03-18 NOTE — ED PROVIDER NOTE - OBJECTIVE STATEMENT
77yo M hx HTN DM DL pw cough, congestion, rhinorrhea x8d mildly improving but still present- also co subj fevers over this weekend, +sore throat, +chest pressure when coughing. +yellow sputum. No nausea/vomiting/diarrhea, chest pain, shortness of breath, abdominal pain, numbness/focal weakness, back pain, dysuria/hematuria, myalgias

## 2019-03-18 NOTE — ED PROVIDER NOTE - NS ED ROS FT
Constitutional:  See HPI.   Eyes:  No visual changes, eye pain or discharge.  ENMT:  No hearing changes, pain, discharge or infections. No neck pain or stiffness.  Cardiac:  No chest pain, SOB or edema. No chest pain with exertion.  Respiratory:  No  respiratory distress. No hemoptysis.  GI:  No nausea, vomiting, diarrhea, abdominal pain.  :  No dysuria, frequency, hematuria  MS:  No joint pain or back pain.  Neuro:  No LOC. No headache or weakness.    Skin:  No skin rash.  Except as in HPI, all other review of systems is negative

## 2019-03-18 NOTE — ED PROVIDER NOTE - PHYSICAL EXAMINATION
Con: Well appearing NAD non toxic.   HEENT: NCAT EOMI conjunctiva normal. No nasal discharge. MMM.   Ears: Normal EAM's b/l. b/l TMs clear- no perforation, +light reflex. No mastoid tenderness.  Nose: No turbinate edema, hematoma, epistaxis.  Throat: +MMM. No posterior oropharyngeal erythema, edema, lesions.  Uvula midline. Floor of mouth soft.   Neck: Supple, non tender, full ROM.   CV: RRR no MRG +S1S2.   Pulm: CTA b/l.   Abd: s NT ND +BS.   Ext: WWP x4, moving all extremities, no edema.   Psy: Cooperative, appropriate.   Skin: Warm, dry, no rash  Neuro: CN2-12 grossly intact no sensory or motor deficits throughout

## 2019-03-18 NOTE — ED PROVIDER NOTE - NSFOLLOWUPINSTRUCTIONS_ED_ALL_ED_FT
Pneumonia    Pneumonia is an infection of the lungs. Pneumonia may be caused by bacteria, viruses, or funguses. Symptoms include coughing, fever, chest pain when breathing deeply or coughing, shortness of breath, fatigue, or muscle aches. Pneumonia can be diagnosed with a medical history and physical exam, as well as other tests which may include a chest X-ray. If you were prescribed an antibiotic medicine, take it as told by your health care provider and do not stop taking the antibiotic even if you start to feel better. Do not use tobacco products, including cigarettes, chewing tobacco, and e-cigarettes.    SEEK IMMEDIATE MEDICAL CARE IF YOU HAVE THE FOLLOWING SYMPTOMS: worsening shortness of breath, worsening chest pain, coughing up blood, change in mental status, lightheadedness/dizziness.

## 2019-09-17 ENCOUNTER — OUTPATIENT (OUTPATIENT)
Dept: OUTPATIENT SERVICES | Facility: HOSPITAL | Age: 79
LOS: 1 days | Discharge: HOME | End: 2019-09-17

## 2019-09-17 DIAGNOSIS — I10 ESSENTIAL (PRIMARY) HYPERTENSION: ICD-10-CM

## 2019-09-17 DIAGNOSIS — Z96.652 PRESENCE OF LEFT ARTIFICIAL KNEE JOINT: Chronic | ICD-10-CM

## 2019-09-17 DIAGNOSIS — E11.9 TYPE 2 DIABETES MELLITUS WITHOUT COMPLICATIONS: ICD-10-CM

## 2019-09-17 DIAGNOSIS — M17.0 BILATERAL PRIMARY OSTEOARTHRITIS OF KNEE: ICD-10-CM

## 2019-11-27 ENCOUNTER — INPATIENT (INPATIENT)
Facility: HOSPITAL | Age: 79
LOS: 3 days | Discharge: HOME | End: 2019-12-01
Attending: INTERNAL MEDICINE | Admitting: INTERNAL MEDICINE
Payer: MEDICARE

## 2019-11-27 VITALS
HEART RATE: 94 BPM | TEMPERATURE: 99 F | SYSTOLIC BLOOD PRESSURE: 160 MMHG | WEIGHT: 154.1 LBS | OXYGEN SATURATION: 97 % | RESPIRATION RATE: 18 BRPM | DIASTOLIC BLOOD PRESSURE: 70 MMHG | HEIGHT: 66 IN

## 2019-11-27 DIAGNOSIS — Z96.652 PRESENCE OF LEFT ARTIFICIAL KNEE JOINT: Chronic | ICD-10-CM

## 2019-11-27 LAB
HCT VFR BLD CALC: 42.5 % — SIGNIFICANT CHANGE UP (ref 42–52)
HGB BLD-MCNC: 14.1 G/DL — SIGNIFICANT CHANGE UP (ref 14–18)
MCHC RBC-ENTMCNC: 28.1 PG — SIGNIFICANT CHANGE UP (ref 27–31)
MCHC RBC-ENTMCNC: 33.2 G/DL — SIGNIFICANT CHANGE UP (ref 32–37)
MCV RBC AUTO: 84.7 FL — SIGNIFICANT CHANGE UP (ref 80–94)
NRBC # BLD: 0 /100 WBCS — SIGNIFICANT CHANGE UP (ref 0–0)
PLATELET # BLD AUTO: 189 K/UL — SIGNIFICANT CHANGE UP (ref 130–400)
RBC # BLD: 5.02 M/UL — SIGNIFICANT CHANGE UP (ref 4.7–6.1)
RBC # FLD: 12.9 % — SIGNIFICANT CHANGE UP (ref 11.5–14.5)
WBC # BLD: 18.84 K/UL — HIGH (ref 4.8–10.8)
WBC # FLD AUTO: 18.84 K/UL — HIGH (ref 4.8–10.8)

## 2019-11-27 PROCEDURE — 99283 EMERGENCY DEPT VISIT LOW MDM: CPT

## 2019-11-27 RX ORDER — CEFTRIAXONE 500 MG/1
1000 INJECTION, POWDER, FOR SOLUTION INTRAMUSCULAR; INTRAVENOUS ONCE
Refills: 0 | Status: COMPLETED | OUTPATIENT
Start: 2019-11-27 | End: 2019-11-27

## 2019-11-27 RX ORDER — SODIUM CHLORIDE 9 MG/ML
1000 INJECTION INTRAMUSCULAR; INTRAVENOUS; SUBCUTANEOUS ONCE
Refills: 0 | Status: COMPLETED | OUTPATIENT
Start: 2019-11-27 | End: 2019-11-27

## 2019-11-27 RX ORDER — ACETAMINOPHEN 500 MG
650 TABLET ORAL ONCE
Refills: 0 | Status: COMPLETED | OUTPATIENT
Start: 2019-11-27 | End: 2019-11-27

## 2019-11-27 RX ADMIN — SODIUM CHLORIDE 1000 MILLILITER(S): 9 INJECTION INTRAMUSCULAR; INTRAVENOUS; SUBCUTANEOUS at 23:45

## 2019-11-27 NOTE — ED ADULT TRIAGE NOTE - CHIEF COMPLAINT QUOTE
Pt reports increased urinary frequency and burning during urination, body aches since yesterday. Pt wife reports subjective fever this afternoon.

## 2019-11-28 LAB
ALBUMIN SERPL ELPH-MCNC: 4.4 G/DL — SIGNIFICANT CHANGE UP (ref 3.5–5.2)
ALP SERPL-CCNC: 56 U/L — SIGNIFICANT CHANGE UP (ref 30–115)
ALT FLD-CCNC: 22 U/L — SIGNIFICANT CHANGE UP (ref 0–41)
ANION GAP SERPL CALC-SCNC: 15 MMOL/L — HIGH (ref 7–14)
ANION GAP SERPL CALC-SCNC: 16 MMOL/L — HIGH (ref 7–14)
APPEARANCE UR: CLEAR — SIGNIFICANT CHANGE UP
AST SERPL-CCNC: 14 U/L — SIGNIFICANT CHANGE UP (ref 0–41)
BACTERIA # UR AUTO: ABNORMAL
BASOPHILS # BLD AUTO: 0.04 K/UL — SIGNIFICANT CHANGE UP (ref 0–0.2)
BASOPHILS NFR BLD AUTO: 0.2 % — SIGNIFICANT CHANGE UP (ref 0–1)
BILIRUB SERPL-MCNC: 1.3 MG/DL — HIGH (ref 0.2–1.2)
BILIRUB UR-MCNC: NEGATIVE — SIGNIFICANT CHANGE UP
BUN SERPL-MCNC: 16 MG/DL — SIGNIFICANT CHANGE UP (ref 10–20)
BUN SERPL-MCNC: 17 MG/DL — SIGNIFICANT CHANGE UP (ref 10–20)
CALCIUM SERPL-MCNC: 8.9 MG/DL — SIGNIFICANT CHANGE UP (ref 8.5–10.1)
CALCIUM SERPL-MCNC: 9.4 MG/DL — SIGNIFICANT CHANGE UP (ref 8.5–10.1)
CHLORIDE SERPL-SCNC: 101 MMOL/L — SIGNIFICANT CHANGE UP (ref 98–110)
CHLORIDE SERPL-SCNC: 98 MMOL/L — SIGNIFICANT CHANGE UP (ref 98–110)
CO2 SERPL-SCNC: 19 MMOL/L — SIGNIFICANT CHANGE UP (ref 17–32)
CO2 SERPL-SCNC: 20 MMOL/L — SIGNIFICANT CHANGE UP (ref 17–32)
COLOR SPEC: YELLOW — SIGNIFICANT CHANGE UP
CREAT SERPL-MCNC: 0.8 MG/DL — SIGNIFICANT CHANGE UP (ref 0.7–1.5)
CREAT SERPL-MCNC: 0.9 MG/DL — SIGNIFICANT CHANGE UP (ref 0.7–1.5)
DIFF PNL FLD: ABNORMAL
EOSINOPHIL # BLD AUTO: 0.01 K/UL — SIGNIFICANT CHANGE UP (ref 0–0.7)
EOSINOPHIL NFR BLD AUTO: 0.1 % — SIGNIFICANT CHANGE UP (ref 0–8)
EPI CELLS # UR: 0 /HPF — SIGNIFICANT CHANGE UP (ref 0–5)
GLUCOSE BLDC GLUCOMTR-MCNC: 255 MG/DL — HIGH (ref 70–99)
GLUCOSE BLDC GLUCOMTR-MCNC: 294 MG/DL — HIGH (ref 70–99)
GLUCOSE BLDC GLUCOMTR-MCNC: 296 MG/DL — HIGH (ref 70–99)
GLUCOSE SERPL-MCNC: 263 MG/DL — HIGH (ref 70–99)
GLUCOSE SERPL-MCNC: 340 MG/DL — HIGH (ref 70–99)
GLUCOSE UR QL: ABNORMAL
HCT VFR BLD CALC: 40.8 % — LOW (ref 42–52)
HGB BLD-MCNC: 13.3 G/DL — LOW (ref 14–18)
HYALINE CASTS # UR AUTO: 0 /LPF — SIGNIFICANT CHANGE UP (ref 0–7)
IMM GRANULOCYTES NFR BLD AUTO: 0.5 % — HIGH (ref 0.1–0.3)
KETONES UR-MCNC: ABNORMAL
LEUKOCYTE ESTERASE UR-ACNC: NEGATIVE — SIGNIFICANT CHANGE UP
LYMPHOCYTES # BLD AUTO: 1.38 K/UL — SIGNIFICANT CHANGE UP (ref 1.2–3.4)
LYMPHOCYTES # BLD AUTO: 7.3 % — LOW (ref 20.5–51.1)
MCHC RBC-ENTMCNC: 28 PG — SIGNIFICANT CHANGE UP (ref 27–31)
MCHC RBC-ENTMCNC: 32.6 G/DL — SIGNIFICANT CHANGE UP (ref 32–37)
MCV RBC AUTO: 85.9 FL — SIGNIFICANT CHANGE UP (ref 80–94)
MONOCYTES # BLD AUTO: 1.43 K/UL — HIGH (ref 0.1–0.6)
MONOCYTES NFR BLD AUTO: 7.5 % — SIGNIFICANT CHANGE UP (ref 1.7–9.3)
NEUTROPHILS # BLD AUTO: 16 K/UL — HIGH (ref 1.4–6.5)
NEUTROPHILS NFR BLD AUTO: 84.4 % — HIGH (ref 42.2–75.2)
NITRITE UR-MCNC: NEGATIVE — SIGNIFICANT CHANGE UP
NRBC # BLD: 0 /100 WBCS — SIGNIFICANT CHANGE UP (ref 0–0)
PH UR: 6 — SIGNIFICANT CHANGE UP (ref 5–8)
PLATELET # BLD AUTO: 177 K/UL — SIGNIFICANT CHANGE UP (ref 130–400)
POTASSIUM SERPL-MCNC: 4.1 MMOL/L — SIGNIFICANT CHANGE UP (ref 3.5–5)
POTASSIUM SERPL-MCNC: 4.1 MMOL/L — SIGNIFICANT CHANGE UP (ref 3.5–5)
POTASSIUM SERPL-SCNC: 4.1 MMOL/L — SIGNIFICANT CHANGE UP (ref 3.5–5)
POTASSIUM SERPL-SCNC: 4.1 MMOL/L — SIGNIFICANT CHANGE UP (ref 3.5–5)
PROT SERPL-MCNC: 6.9 G/DL — SIGNIFICANT CHANGE UP (ref 6–8)
PROT UR-MCNC: ABNORMAL
RBC # BLD: 4.75 M/UL — SIGNIFICANT CHANGE UP (ref 4.7–6.1)
RBC # FLD: 13.1 % — SIGNIFICANT CHANGE UP (ref 11.5–14.5)
RBC CASTS # UR COMP ASSIST: 2 /HPF — SIGNIFICANT CHANGE UP (ref 0–4)
SODIUM SERPL-SCNC: 133 MMOL/L — LOW (ref 135–146)
SODIUM SERPL-SCNC: 136 MMOL/L — SIGNIFICANT CHANGE UP (ref 135–146)
SP GR SPEC: 1.02 — SIGNIFICANT CHANGE UP (ref 1.01–1.02)
UROBILINOGEN FLD QL: SIGNIFICANT CHANGE UP
WBC # BLD: 18.96 K/UL — HIGH (ref 4.8–10.8)
WBC # FLD AUTO: 18.96 K/UL — HIGH (ref 4.8–10.8)
WBC UR QL: 21 /HPF — HIGH (ref 0–5)

## 2019-11-28 PROCEDURE — 99223 1ST HOSP IP/OBS HIGH 75: CPT

## 2019-11-28 RX ORDER — GABAPENTIN 400 MG/1
1 CAPSULE ORAL
Qty: 0 | Refills: 0 | DISCHARGE

## 2019-11-28 RX ORDER — ACETAMINOPHEN 500 MG
650 TABLET ORAL EVERY 6 HOURS
Refills: 0 | Status: DISCONTINUED | OUTPATIENT
Start: 2019-11-28 | End: 2019-11-29

## 2019-11-28 RX ORDER — DEXTROSE 50 % IN WATER 50 %
12.5 SYRINGE (ML) INTRAVENOUS ONCE
Refills: 0 | Status: DISCONTINUED | OUTPATIENT
Start: 2019-11-28 | End: 2019-12-01

## 2019-11-28 RX ORDER — DEXTROSE 50 % IN WATER 50 %
25 SYRINGE (ML) INTRAVENOUS ONCE
Refills: 0 | Status: DISCONTINUED | OUTPATIENT
Start: 2019-11-28 | End: 2019-12-01

## 2019-11-28 RX ORDER — CHLORHEXIDINE GLUCONATE 213 G/1000ML
1 SOLUTION TOPICAL
Refills: 0 | Status: DISCONTINUED | OUTPATIENT
Start: 2019-11-28 | End: 2019-12-01

## 2019-11-28 RX ORDER — OXYCODONE AND ACETAMINOPHEN 5; 325 MG/1; MG/1
2 TABLET ORAL EVERY 6 HOURS
Refills: 0 | Status: DISCONTINUED | OUTPATIENT
Start: 2019-11-28 | End: 2019-12-01

## 2019-11-28 RX ORDER — INFLUENZA VIRUS VACCINE 15; 15; 15; 15 UG/.5ML; UG/.5ML; UG/.5ML; UG/.5ML
0.5 SUSPENSION INTRAMUSCULAR ONCE
Refills: 0 | Status: DISCONTINUED | OUTPATIENT
Start: 2019-11-28 | End: 2019-12-01

## 2019-11-28 RX ORDER — LISINOPRIL 2.5 MG/1
10 TABLET ORAL DAILY
Refills: 0 | Status: DISCONTINUED | OUTPATIENT
Start: 2019-11-28 | End: 2019-12-01

## 2019-11-28 RX ORDER — AMLODIPINE BESYLATE 2.5 MG/1
10 TABLET ORAL DAILY
Refills: 0 | Status: DISCONTINUED | OUTPATIENT
Start: 2019-11-28 | End: 2019-12-01

## 2019-11-28 RX ORDER — CEFEPIME 1 G/1
1000 INJECTION, POWDER, FOR SOLUTION INTRAMUSCULAR; INTRAVENOUS EVERY 8 HOURS
Refills: 0 | Status: DISCONTINUED | OUTPATIENT
Start: 2019-11-28 | End: 2019-11-29

## 2019-11-28 RX ORDER — GLUCAGON INJECTION, SOLUTION 0.5 MG/.1ML
1 INJECTION, SOLUTION SUBCUTANEOUS ONCE
Refills: 0 | Status: DISCONTINUED | OUTPATIENT
Start: 2019-11-28 | End: 2019-12-01

## 2019-11-28 RX ORDER — METFORMIN HYDROCHLORIDE 850 MG/1
1 TABLET ORAL
Qty: 0 | Refills: 0 | DISCHARGE

## 2019-11-28 RX ORDER — DEXTROSE 50 % IN WATER 50 %
15 SYRINGE (ML) INTRAVENOUS ONCE
Refills: 0 | Status: DISCONTINUED | OUTPATIENT
Start: 2019-11-28 | End: 2019-12-01

## 2019-11-28 RX ORDER — ENOXAPARIN SODIUM 100 MG/ML
40 INJECTION SUBCUTANEOUS AT BEDTIME
Refills: 0 | Status: DISCONTINUED | OUTPATIENT
Start: 2019-11-28 | End: 2019-12-01

## 2019-11-28 RX ORDER — INSULIN LISPRO 100/ML
VIAL (ML) SUBCUTANEOUS
Refills: 0 | Status: DISCONTINUED | OUTPATIENT
Start: 2019-11-28 | End: 2019-12-01

## 2019-11-28 RX ORDER — SODIUM CHLORIDE 9 MG/ML
1000 INJECTION, SOLUTION INTRAVENOUS
Refills: 0 | Status: DISCONTINUED | OUTPATIENT
Start: 2019-11-28 | End: 2019-12-01

## 2019-11-28 RX ADMIN — OXYCODONE AND ACETAMINOPHEN 2 TABLET(S): 5; 325 TABLET ORAL at 17:08

## 2019-11-28 RX ADMIN — Medication 650 MILLIGRAM(S): at 10:58

## 2019-11-28 RX ADMIN — ENOXAPARIN SODIUM 40 MILLIGRAM(S): 100 INJECTION SUBCUTANEOUS at 21:27

## 2019-11-28 RX ADMIN — CEFEPIME 100 MILLIGRAM(S): 1 INJECTION, POWDER, FOR SOLUTION INTRAMUSCULAR; INTRAVENOUS at 14:17

## 2019-11-28 RX ADMIN — CEFTRIAXONE 100 MILLIGRAM(S): 500 INJECTION, POWDER, FOR SOLUTION INTRAMUSCULAR; INTRAVENOUS at 00:27

## 2019-11-28 RX ADMIN — AMLODIPINE BESYLATE 10 MILLIGRAM(S): 2.5 TABLET ORAL at 05:23

## 2019-11-28 RX ADMIN — Medication 3: at 12:37

## 2019-11-28 RX ADMIN — OXYCODONE AND ACETAMINOPHEN 2 TABLET(S): 5; 325 TABLET ORAL at 18:00

## 2019-11-28 RX ADMIN — LISINOPRIL 10 MILLIGRAM(S): 2.5 TABLET ORAL at 05:23

## 2019-11-28 RX ADMIN — Medication 650 MILLIGRAM(S): at 00:26

## 2019-11-28 RX ADMIN — CEFEPIME 100 MILLIGRAM(S): 1 INJECTION, POWDER, FOR SOLUTION INTRAMUSCULAR; INTRAVENOUS at 21:27

## 2019-11-28 RX ADMIN — CHLORHEXIDINE GLUCONATE 1 APPLICATION(S): 213 SOLUTION TOPICAL at 05:25

## 2019-11-28 RX ADMIN — CEFEPIME 100 MILLIGRAM(S): 1 INJECTION, POWDER, FOR SOLUTION INTRAMUSCULAR; INTRAVENOUS at 05:23

## 2019-11-28 RX ADMIN — Medication 3: at 17:07

## 2019-11-28 NOTE — H&P ADULT - ASSESSMENT
78 y/o M w pmh of HTN, DM presents with complains of fever, chills and dysuria started yesterday.    #) UTI  - UA +  - f/u Urine and blood cx  - Rocephin 1g IV qd for 3 days    #) HTN  - cont home meds    #) DM  - start basal/ bolus if FS > 180    #) DVT prophylaxis - Lovenox  #) GI prophylaxis - not indicated  #) diet - DASH  Full code 78 y/o M w pmh of HTN, DM presents with complains of fever, chills and dysuria started yesterday.    #) UTI  - UA +  - f/u Urine cx  - cefepime 1g IV q8h    #) HTN  - cont home meds    #) DM  - start basal/ bolus if FS > 180    #) DVT prophylaxis - Lovenox  #) GI prophylaxis - not indicated  #) diet - DASH  Full code

## 2019-11-28 NOTE — H&P ADULT - HISTORY OF PRESENT ILLNESS
80 y/o M w pmh of HTN, DM presents with complains of fever, chills and dysuria started yesterday. Pt reports there is associated suprapubic pain radiating down to testicles. Since last night pt also became incontinent of urine, no prior hx of urinary incontinence. Denies HA, CP, SOB, nausea, vomiting, blood in stool or urine.     ED vitals: /70, HR 94, RR 18, T 98.7 F, sPO2 97% RA  UA +, Received ceftriaxone 1G x1 and 1L IV NS bolus

## 2019-11-28 NOTE — ED PROVIDER NOTE - OBJECTIVE STATEMENT
80 y/o M w pmh of HTN, DM presents w dysuria, frequency, chills, weakness. No abd pain. No flank pain. No hematuria. No n/v.

## 2019-11-28 NOTE — ED ADULT NURSE NOTE - OBJECTIVE STATEMENT
Pt presents from home with complaints of frequent burning urination and body aches since yesterday. Pt wife at bedside states pt had fever this afternoon but did not take temp.     t reports increased urinary frequency and burning during urination, body aches since yesterday. Pt wife reports subjective fever this afternoon.

## 2019-11-28 NOTE — H&P ADULT - NSHPPHYSICALEXAM_GEN_ALL_CORE
GENERAL: NAD, Resting in bed  HEAD:  Atraumatic, Normocephalic  EYES: EOMI, PERRLA, conjunctiva and sclera clear  NECK: Supple, No JVD  CHEST/LUNG: Clear to auscultation bilaterally; No rales, rhonchi, wheezing, or rubs. Unlabored respirations  HEART: Regular rate and rhythm; No murmurs, rubs, or gallops  ABDOMEN: Bowel sounds present; Soft, Nontender, Nondistended. No hepatomegally  EXTREMITIES: No clubbing, cyanosis, or edema  NERVOUS SYSTEM:  Alert & Oriented X3  : no Scrotal swelling noted, non tender to palpate

## 2019-11-28 NOTE — H&P ADULT - NSHPLABSRESULTS_GEN_ALL_CORE
14.1   18.84 )-----------( 189      ( 2019 22:16 )             42.5           136  |  101  |  17  ----------------------------<  263<H>  4.1   |  19  |  0.8    Ca    9.4      2019 22:16    TPro  6.9  /  Alb  4.4  /  TBili  1.3<H>  /  DBili  x   /  AST  14  /  ALT  22  /  AlkPhos  56                Urinalysis Basic - ( 2019 00:30 )    Color: Yellow / Appearance: Clear / S.023 / pH: x  Gluc: x / Ketone: Small  / Bili: Negative / Urobili: <2 mg/dL   Blood: x / Protein: 100 mg/dL / Nitrite: Negative   Leuk Esterase: Negative / RBC: 2 /HPF / WBC 21 /HPF   Sq Epi: x / Non Sq Epi: 0 /HPF / Bacteria: Many

## 2019-11-28 NOTE — H&P ADULT - NSICDXFAMILYHX_GEN_ALL_CORE_FT
FAMILY HISTORY:  No pertinent family history in first degree relatives, states that he has a FH of cancer (does not know what type) in his father and mother both  at the age of 65 yo

## 2019-11-29 LAB
ALBUMIN SERPL ELPH-MCNC: 3.9 G/DL — SIGNIFICANT CHANGE UP (ref 3.5–5.2)
ALP SERPL-CCNC: 76 U/L — SIGNIFICANT CHANGE UP (ref 30–115)
ALT FLD-CCNC: 16 U/L — SIGNIFICANT CHANGE UP (ref 0–41)
ANION GAP SERPL CALC-SCNC: 14 MMOL/L — SIGNIFICANT CHANGE UP (ref 7–14)
APTT BLD: 29 SEC — SIGNIFICANT CHANGE UP (ref 27–39.2)
AST SERPL-CCNC: 9 U/L — SIGNIFICANT CHANGE UP (ref 0–41)
BILIRUB SERPL-MCNC: 0.4 MG/DL — SIGNIFICANT CHANGE UP (ref 0.2–1.2)
BUN SERPL-MCNC: 21 MG/DL — HIGH (ref 10–20)
CALCIUM SERPL-MCNC: 9 MG/DL — SIGNIFICANT CHANGE UP (ref 8.5–10.1)
CHLORIDE SERPL-SCNC: 100 MMOL/L — SIGNIFICANT CHANGE UP (ref 98–110)
CO2 SERPL-SCNC: 23 MMOL/L — SIGNIFICANT CHANGE UP (ref 17–32)
CREAT SERPL-MCNC: 0.9 MG/DL — SIGNIFICANT CHANGE UP (ref 0.7–1.5)
ESTIMATED AVERAGE GLUCOSE: 240 MG/DL — HIGH (ref 68–114)
GLUCOSE BLDC GLUCOMTR-MCNC: 235 MG/DL — HIGH (ref 70–99)
GLUCOSE BLDC GLUCOMTR-MCNC: 264 MG/DL — HIGH (ref 70–99)
GLUCOSE BLDC GLUCOMTR-MCNC: 323 MG/DL — HIGH (ref 70–99)
GLUCOSE BLDC GLUCOMTR-MCNC: 325 MG/DL — HIGH (ref 70–99)
GLUCOSE SERPL-MCNC: 376 MG/DL — HIGH (ref 70–99)
HBA1C BLD-MCNC: 10 % — HIGH (ref 4–5.6)
HCT VFR BLD CALC: 43.8 % — SIGNIFICANT CHANGE UP (ref 42–52)
HGB BLD-MCNC: 14.3 G/DL — SIGNIFICANT CHANGE UP (ref 14–18)
INR BLD: 1.04 RATIO — SIGNIFICANT CHANGE UP (ref 0.65–1.3)
MAGNESIUM SERPL-MCNC: 2.1 MG/DL — SIGNIFICANT CHANGE UP (ref 1.8–2.4)
MCHC RBC-ENTMCNC: 28.1 PG — SIGNIFICANT CHANGE UP (ref 27–31)
MCHC RBC-ENTMCNC: 32.6 G/DL — SIGNIFICANT CHANGE UP (ref 32–37)
MCV RBC AUTO: 86.1 FL — SIGNIFICANT CHANGE UP (ref 80–94)
NRBC # BLD: 0 /100 WBCS — SIGNIFICANT CHANGE UP (ref 0–0)
PLATELET # BLD AUTO: 172 K/UL — SIGNIFICANT CHANGE UP (ref 130–400)
POTASSIUM SERPL-MCNC: 4.8 MMOL/L — SIGNIFICANT CHANGE UP (ref 3.5–5)
POTASSIUM SERPL-SCNC: 4.8 MMOL/L — SIGNIFICANT CHANGE UP (ref 3.5–5)
PROT SERPL-MCNC: 6.8 G/DL — SIGNIFICANT CHANGE UP (ref 6–8)
PROTHROM AB SERPL-ACNC: 12 SEC — SIGNIFICANT CHANGE UP (ref 9.95–12.87)
RBC # BLD: 5.09 M/UL — SIGNIFICANT CHANGE UP (ref 4.7–6.1)
RBC # FLD: 12.7 % — SIGNIFICANT CHANGE UP (ref 11.5–14.5)
SODIUM SERPL-SCNC: 137 MMOL/L — SIGNIFICANT CHANGE UP (ref 135–146)
WBC # BLD: 9.38 K/UL — SIGNIFICANT CHANGE UP (ref 4.8–10.8)
WBC # FLD AUTO: 9.38 K/UL — SIGNIFICANT CHANGE UP (ref 4.8–10.8)

## 2019-11-29 PROCEDURE — 99233 SBSQ HOSP IP/OBS HIGH 50: CPT

## 2019-11-29 RX ORDER — ACETAMINOPHEN 500 MG
650 TABLET ORAL EVERY 6 HOURS
Refills: 0 | Status: DISCONTINUED | OUTPATIENT
Start: 2019-11-29 | End: 2019-12-01

## 2019-11-29 RX ORDER — INSULIN LISPRO 100/ML
6 VIAL (ML) SUBCUTANEOUS
Refills: 0 | Status: DISCONTINUED | OUTPATIENT
Start: 2019-11-29 | End: 2019-12-01

## 2019-11-29 RX ORDER — INSULIN GLARGINE 100 [IU]/ML
13 INJECTION, SOLUTION SUBCUTANEOUS AT BEDTIME
Refills: 0 | Status: DISCONTINUED | OUTPATIENT
Start: 2019-11-29 | End: 2019-12-01

## 2019-11-29 RX ORDER — CEFTRIAXONE 500 MG/1
1000 INJECTION, POWDER, FOR SOLUTION INTRAMUSCULAR; INTRAVENOUS EVERY 24 HOURS
Refills: 0 | Status: DISCONTINUED | OUTPATIENT
Start: 2019-11-29 | End: 2019-12-01

## 2019-11-29 RX ADMIN — Medication 2: at 08:29

## 2019-11-29 RX ADMIN — CEFTRIAXONE 100 MILLIGRAM(S): 500 INJECTION, POWDER, FOR SOLUTION INTRAMUSCULAR; INTRAVENOUS at 12:40

## 2019-11-29 RX ADMIN — CHLORHEXIDINE GLUCONATE 1 APPLICATION(S): 213 SOLUTION TOPICAL at 05:08

## 2019-11-29 RX ADMIN — AMLODIPINE BESYLATE 10 MILLIGRAM(S): 2.5 TABLET ORAL at 05:08

## 2019-11-29 RX ADMIN — LISINOPRIL 10 MILLIGRAM(S): 2.5 TABLET ORAL at 05:08

## 2019-11-29 RX ADMIN — CEFEPIME 100 MILLIGRAM(S): 1 INJECTION, POWDER, FOR SOLUTION INTRAMUSCULAR; INTRAVENOUS at 05:07

## 2019-11-29 RX ADMIN — Medication 4: at 17:42

## 2019-11-29 RX ADMIN — OXYCODONE AND ACETAMINOPHEN 2 TABLET(S): 5; 325 TABLET ORAL at 12:41

## 2019-11-29 RX ADMIN — Medication 4: at 12:40

## 2019-11-29 RX ADMIN — ENOXAPARIN SODIUM 40 MILLIGRAM(S): 100 INJECTION SUBCUTANEOUS at 22:55

## 2019-11-29 RX ADMIN — Medication 650 MILLIGRAM(S): at 01:30

## 2019-11-29 RX ADMIN — Medication 650 MILLIGRAM(S): at 00:43

## 2019-11-29 RX ADMIN — INSULIN GLARGINE 13 UNIT(S): 100 INJECTION, SOLUTION SUBCUTANEOUS at 22:55

## 2019-11-29 RX ADMIN — OXYCODONE AND ACETAMINOPHEN 2 TABLET(S): 5; 325 TABLET ORAL at 22:54

## 2019-11-29 NOTE — PROGRESS NOTE ADULT - ATTENDING COMMENTS
#Progress Note Handoff    Pending (specify):  Urine culture and sensitivity  Family discussion: D/W patient regarding the treatment plan and also discharge plan   Disposition: Home tomorrow

## 2019-11-29 NOTE — PROGRESS NOTE ADULT - SUBJECTIVE AND OBJECTIVE BOX
ROBERT REYES 79y Male  MRN#: [id number]         SUBJECTIVE  Patient is a 79y old Male who presents with a chief complaint of Dysuria (2019 03:40)  Currently admitted to medicine with the primary diagnosis of UTI (urinary tract infection)    Patient reports feeling better. still has dysuria    OBJECTIVE  PAST MEDICAL & SURGICAL HISTORY  HLD (hyperlipidemia)  HTN (hypertension)  Diabetes mellitus  History of knee replacement, total, left    ALLERGIES:  No Known Allergies    MEDICATIONS:  STANDING MEDICATIONS  amLODIPine   Tablet 10 milliGRAM(s) Oral daily  cefTRIAXone   IVPB 1000 milliGRAM(s) IV Intermittent every 24 hours  chlorhexidine 4% Liquid 1 Application(s) Topical <User Schedule>  dextrose 5%. 1000 milliLiter(s) IV Continuous <Continuous>  dextrose 50% Injectable 12.5 Gram(s) IV Push once  dextrose 50% Injectable 25 Gram(s) IV Push once  dextrose 50% Injectable 25 Gram(s) IV Push once  enoxaparin Injectable 40 milliGRAM(s) SubCutaneous at bedtime  influenza   Vaccine 0.5 milliLiter(s) IntraMuscular once  insulin lispro (HumaLOG) corrective regimen sliding scale   SubCutaneous three times a day before meals  lisinopril 10 milliGRAM(s) Oral daily    PRN MEDICATIONS  acetaminophen   Tablet .. 650 milliGRAM(s) Oral every 6 hours PRN  dextrose 40% Gel 15 Gram(s) Oral once PRN  glucagon  Injectable 1 milliGRAM(s) IntraMuscular once PRN  oxycodone    5 mG/acetaminophen 325 mG 2 Tablet(s) Oral every 6 hours PRN      VITAL SIGNS: Last 24 Hours  T(C): 37.2 (2019 07:30), Max: 38.7 (2019 00:11)  T(F): 99 (2019 07:30), Max: 101.7 (2019 00:11)  HR: 69 (2019 07:30) (61 - 82)  BP: 139/63 (2019 07:30) (121/57 - 149/69)  BP(mean): --  RR: 18 (2019 07:30) (18 - 18)  SpO2: 97% (2019 05:05) (97% - 97%)    LABS:                        13.3   18.96 )-----------( 177      ( 2019 11:16 )             40.8     11-    133<L>  |  98  |  16  ----------------------------<  340<H>  4.1   |  20  |  0.9    Ca    8.9      2019 11:16    TPro  6.9  /  Alb  4.4  /  TBili  1.3<H>  /  DBili  x   /  AST  14  /  ALT  22  /  AlkPhos  56  11-27      Urinalysis Basic - ( 2019 00:30 )    Color: Yellow / Appearance: Clear / S.023 / pH: x  Gluc: x / Ketone: Small  / Bili: Negative / Urobili: <2 mg/dL   Blood: x / Protein: 100 mg/dL / Nitrite: Negative   Leuk Esterase: Negative / RBC: 2 /HPF / WBC 21 /HPF   Sq Epi: x / Non Sq Epi: 0 /HPF / Bacteria: Many                RADIOLOGY:         Physical Exam:    GENERAL: NAD, Resting in bed  	HEAD:  Atraumatic, Normocephalic  	EYES: EOMI, PERRLA, conjunctiva and sclera clear  	NECK: Supple, No JVD  	CHEST/LUNG: Clear to auscultation bilaterally; No rales, rhonchi, wheezing, or rubs. Unlabored respirations  	HEART: Regular rate and rhythm; No murmurs, rubs, or gallops  	ABDOMEN: Bowel sounds present; Soft, Nontender, Nondistended. No hepatomegally  	EXTREMITIES: No clubbing, cyanosis, or edema  	NERVOUS SYSTEM:  Alert & Oriented X3    : no Scrotal swelling noted, non tender to palpate      Assessment and Plan  80 y/o M w pmh of HTN, DM presents with complains of fever, chills and dysuria started 2 days ago    #) UTI  - UA +  - f/u Urine cx  - Ceftriaxone D2 abx    #) HTN  - cont home meds    #) DM  - insulin scale    #) DVT prophylaxis - Lovenox  #) GI prophylaxis - not indicated  #) diet - DASH  Full code

## 2019-11-30 LAB
-  AMIKACIN: SIGNIFICANT CHANGE UP
-  AMPICILLIN/SULBACTAM: SIGNIFICANT CHANGE UP
-  AMPICILLIN: SIGNIFICANT CHANGE UP
-  AZTREONAM: SIGNIFICANT CHANGE UP
-  CEFAZOLIN: SIGNIFICANT CHANGE UP
-  CEFEPIME: SIGNIFICANT CHANGE UP
-  CEFOXITIN: SIGNIFICANT CHANGE UP
-  CEFTRIAXONE: SIGNIFICANT CHANGE UP
-  CIPROFLOXACIN: SIGNIFICANT CHANGE UP
-  GENTAMICIN: SIGNIFICANT CHANGE UP
-  IMIPENEM: SIGNIFICANT CHANGE UP
-  LEVOFLOXACIN: SIGNIFICANT CHANGE UP
-  MEROPENEM: SIGNIFICANT CHANGE UP
-  NITROFURANTOIN: SIGNIFICANT CHANGE UP
-  PIPERACILLIN/TAZOBACTAM: SIGNIFICANT CHANGE UP
-  TIGECYCLINE: SIGNIFICANT CHANGE UP
-  TOBRAMYCIN: SIGNIFICANT CHANGE UP
-  TRIMETHOPRIM/SULFAMETHOXAZOLE: SIGNIFICANT CHANGE UP
ANION GAP SERPL CALC-SCNC: 16 MMOL/L — HIGH (ref 7–14)
BASOPHILS # BLD AUTO: 0.03 K/UL — SIGNIFICANT CHANGE UP (ref 0–0.2)
BASOPHILS NFR BLD AUTO: 0.4 % — SIGNIFICANT CHANGE UP (ref 0–1)
BUN SERPL-MCNC: 17 MG/DL — SIGNIFICANT CHANGE UP (ref 10–20)
CALCIUM SERPL-MCNC: 8.4 MG/DL — LOW (ref 8.5–10.1)
CHLORIDE SERPL-SCNC: 102 MMOL/L — SIGNIFICANT CHANGE UP (ref 98–110)
CO2 SERPL-SCNC: 22 MMOL/L — SIGNIFICANT CHANGE UP (ref 17–32)
CREAT SERPL-MCNC: 0.7 MG/DL — SIGNIFICANT CHANGE UP (ref 0.7–1.5)
CULTURE RESULTS: SIGNIFICANT CHANGE UP
EOSINOPHIL # BLD AUTO: 0.25 K/UL — SIGNIFICANT CHANGE UP (ref 0–0.7)
EOSINOPHIL NFR BLD AUTO: 3.6 % — SIGNIFICANT CHANGE UP (ref 0–8)
GLUCOSE BLDC GLUCOMTR-MCNC: 216 MG/DL — HIGH (ref 70–99)
GLUCOSE BLDC GLUCOMTR-MCNC: 277 MG/DL — HIGH (ref 70–99)
GLUCOSE BLDC GLUCOMTR-MCNC: 289 MG/DL — HIGH (ref 70–99)
GLUCOSE BLDC GLUCOMTR-MCNC: 301 MG/DL — HIGH (ref 70–99)
GLUCOSE SERPL-MCNC: 250 MG/DL — HIGH (ref 70–99)
HCT VFR BLD CALC: 39 % — LOW (ref 42–52)
HGB BLD-MCNC: 12.9 G/DL — LOW (ref 14–18)
IMM GRANULOCYTES NFR BLD AUTO: 0.3 % — SIGNIFICANT CHANGE UP (ref 0.1–0.3)
LYMPHOCYTES # BLD AUTO: 1.14 K/UL — LOW (ref 1.2–3.4)
LYMPHOCYTES # BLD AUTO: 16.4 % — LOW (ref 20.5–51.1)
MAGNESIUM SERPL-MCNC: 2 MG/DL — SIGNIFICANT CHANGE UP (ref 1.8–2.4)
MCHC RBC-ENTMCNC: 28.2 PG — SIGNIFICANT CHANGE UP (ref 27–31)
MCHC RBC-ENTMCNC: 33.1 G/DL — SIGNIFICANT CHANGE UP (ref 32–37)
MCV RBC AUTO: 85.3 FL — SIGNIFICANT CHANGE UP (ref 80–94)
METHOD TYPE: SIGNIFICANT CHANGE UP
MONOCYTES # BLD AUTO: 0.83 K/UL — HIGH (ref 0.1–0.6)
MONOCYTES NFR BLD AUTO: 11.9 % — HIGH (ref 1.7–9.3)
NEUTROPHILS # BLD AUTO: 4.68 K/UL — SIGNIFICANT CHANGE UP (ref 1.4–6.5)
NEUTROPHILS NFR BLD AUTO: 67.4 % — SIGNIFICANT CHANGE UP (ref 42.2–75.2)
NRBC # BLD: 0 /100 WBCS — SIGNIFICANT CHANGE UP (ref 0–0)
ORGANISM # SPEC MICROSCOPIC CNT: SIGNIFICANT CHANGE UP
ORGANISM # SPEC MICROSCOPIC CNT: SIGNIFICANT CHANGE UP
PLATELET # BLD AUTO: 167 K/UL — SIGNIFICANT CHANGE UP (ref 130–400)
POTASSIUM SERPL-MCNC: 4.1 MMOL/L — SIGNIFICANT CHANGE UP (ref 3.5–5)
POTASSIUM SERPL-SCNC: 4.1 MMOL/L — SIGNIFICANT CHANGE UP (ref 3.5–5)
RBC # BLD: 4.57 M/UL — LOW (ref 4.7–6.1)
RBC # FLD: 12.6 % — SIGNIFICANT CHANGE UP (ref 11.5–14.5)
SODIUM SERPL-SCNC: 140 MMOL/L — SIGNIFICANT CHANGE UP (ref 135–146)
SPECIMEN SOURCE: SIGNIFICANT CHANGE UP
WBC # BLD: 6.95 K/UL — SIGNIFICANT CHANGE UP (ref 4.8–10.8)
WBC # FLD AUTO: 6.95 K/UL — SIGNIFICANT CHANGE UP (ref 4.8–10.8)

## 2019-11-30 PROCEDURE — 99233 SBSQ HOSP IP/OBS HIGH 50: CPT

## 2019-11-30 RX ADMIN — OXYCODONE AND ACETAMINOPHEN 2 TABLET(S): 5; 325 TABLET ORAL at 00:00

## 2019-11-30 RX ADMIN — Medication 3: at 12:43

## 2019-11-30 RX ADMIN — Medication 650 MILLIGRAM(S): at 00:20

## 2019-11-30 RX ADMIN — Medication 2: at 08:16

## 2019-11-30 RX ADMIN — CEFTRIAXONE 100 MILLIGRAM(S): 500 INJECTION, POWDER, FOR SOLUTION INTRAMUSCULAR; INTRAVENOUS at 13:46

## 2019-11-30 RX ADMIN — LISINOPRIL 10 MILLIGRAM(S): 2.5 TABLET ORAL at 05:02

## 2019-11-30 RX ADMIN — Medication 6 UNIT(S): at 12:44

## 2019-11-30 RX ADMIN — AMLODIPINE BESYLATE 10 MILLIGRAM(S): 2.5 TABLET ORAL at 05:02

## 2019-11-30 RX ADMIN — ENOXAPARIN SODIUM 40 MILLIGRAM(S): 100 INJECTION SUBCUTANEOUS at 21:17

## 2019-11-30 RX ADMIN — Medication 6 UNIT(S): at 17:22

## 2019-11-30 RX ADMIN — Medication 3: at 17:21

## 2019-11-30 RX ADMIN — Medication 650 MILLIGRAM(S): at 03:12

## 2019-11-30 RX ADMIN — OXYCODONE AND ACETAMINOPHEN 2 TABLET(S): 5; 325 TABLET ORAL at 10:00

## 2019-11-30 RX ADMIN — OXYCODONE AND ACETAMINOPHEN 2 TABLET(S): 5; 325 TABLET ORAL at 21:16

## 2019-11-30 RX ADMIN — INSULIN GLARGINE 13 UNIT(S): 100 INJECTION, SOLUTION SUBCUTANEOUS at 21:22

## 2019-11-30 RX ADMIN — OXYCODONE AND ACETAMINOPHEN 2 TABLET(S): 5; 325 TABLET ORAL at 20:23

## 2019-11-30 RX ADMIN — Medication 6 UNIT(S): at 08:17

## 2019-11-30 RX ADMIN — OXYCODONE AND ACETAMINOPHEN 2 TABLET(S): 5; 325 TABLET ORAL at 09:31

## 2019-11-30 NOTE — PROGRESS NOTE ADULT - SUBJECTIVE AND OBJECTIVE BOX
ROBERT REYES 79y Male  MRN#: [id number]       SUBJECTIVE  Patient is a 79y old Male who presents with a chief complaint of Dysuria (29 Nov 2019 10:44)  Currently admitted to medicine with the primary diagnosis of UTI (urinary tract infection)    Patient reports improvememnt in his dysuria      OBJECTIVE  PAST MEDICAL & SURGICAL HISTORY  HLD (hyperlipidemia)  HTN (hypertension)  Diabetes mellitus  History of knee replacement, total, left    ALLERGIES:  No Known Allergies    MEDICATIONS:  STANDING MEDICATIONS  amLODIPine   Tablet 10 milliGRAM(s) Oral daily  cefTRIAXone   IVPB 1000 milliGRAM(s) IV Intermittent every 24 hours  chlorhexidine 4% Liquid 1 Application(s) Topical <User Schedule>  dextrose 5%. 1000 milliLiter(s) IV Continuous <Continuous>  dextrose 50% Injectable 12.5 Gram(s) IV Push once  dextrose 50% Injectable 25 Gram(s) IV Push once  dextrose 50% Injectable 25 Gram(s) IV Push once  enoxaparin Injectable 40 milliGRAM(s) SubCutaneous at bedtime  influenza   Vaccine 0.5 milliLiter(s) IntraMuscular once  insulin glargine Injectable (LANTUS) 13 Unit(s) SubCutaneous at bedtime  insulin lispro (HumaLOG) corrective regimen sliding scale   SubCutaneous three times a day before meals  insulin lispro Injectable (HumaLOG) 6 Unit(s) SubCutaneous three times a day before meals  lisinopril 10 milliGRAM(s) Oral daily    PRN MEDICATIONS  acetaminophen   Tablet .. 650 milliGRAM(s) Oral every 6 hours PRN  dextrose 40% Gel 15 Gram(s) Oral once PRN  glucagon  Injectable 1 milliGRAM(s) IntraMuscular once PRN  oxycodone    5 mG/acetaminophen 325 mG 2 Tablet(s) Oral every 6 hours PRN      VITAL SIGNS: Last 24 Hours  T(C): 37 (30 Nov 2019 07:39), Max: 38.1 (29 Nov 2019 23:44)  T(F): 98.6 (30 Nov 2019 07:39), Max: 100.5 (29 Nov 2019 23:44)  HR: 67 (30 Nov 2019 07:39) (64 - 77)  BP: 134/74 (30 Nov 2019 07:39) (108/59 - 143/73)  BP(mean): --  RR: 18 (30 Nov 2019 07:39) (18 - 18)  SpO2: --    LABS:                        12.9   6.95  )-----------( 167      ( 30 Nov 2019 06:15 )             39.0     11-30    140  |  102  |  17  ----------------------------<  250<H>  4.1   |  22  |  0.7    Ca    8.4<L>      30 Nov 2019 06:15  Mg     2.0     11-30    TPro  6.8  /  Alb  3.9  /  TBili  0.4  /  DBili  x   /  AST  9   /  ALT  16  /  AlkPhos  76  11-29    PT/INR - ( 29 Nov 2019 17:48 )   PT: 12.00 sec;   INR: 1.04 ratio         PTT - ( 29 Nov 2019 17:48 )  PTT:29.0 sec          Culture - Urine (collected 28 Nov 2019 00:30)  Source: .Urine Clean Catch (Midstream)  Preliminary Report (29 Nov 2019 16:50):    >100,000 CFU/ml Gram Negative Rods          RADIOLOGY:         Physical Exam:    GENERAL: NAD, Resting in bed  	HEAD:  Atraumatic, Normocephalic  	EYES: EOMI, PERRLA, conjunctiva and sclera clear  	NECK: Supple, No JVD  	CHEST/LUNG: Clear to auscultation bilaterally; No rales, rhonchi, wheezing, or rubs. Unlabored respirations  	HEART: Regular rate and rhythm; No murmurs, rubs, or gallops  	ABDOMEN: Bowel sounds present; Soft, Nontender, Nondistended. No hepatomegally  	EXTREMITIES: No clubbing, cyanosis, or edema  	NERVOUS SYSTEM:  Alert & Oriented X3    : no Scrotal swelling noted, non tender to palpate      Assessment and Plan  80 y/o M w pmh of HTN, DM presents with complains of fever, chills and dysuria     #) UTI  - UA +  - f/u Urine cx --> >100,000 gram negative rods. f/u final culture results  - Ceftriaxone D3 abx    #) HTN  - cont home meds    #) DM  - insulin scale    #) DVT prophylaxis - Lovenox  #) GI prophylaxis - not indicated  #) diet - DASH  Full code

## 2019-12-01 ENCOUNTER — TRANSCRIPTION ENCOUNTER (OUTPATIENT)
Age: 79
End: 2019-12-01

## 2019-12-01 VITALS
SYSTOLIC BLOOD PRESSURE: 136 MMHG | HEART RATE: 74 BPM | TEMPERATURE: 99 F | DIASTOLIC BLOOD PRESSURE: 75 MMHG | RESPIRATION RATE: 18 BRPM

## 2019-12-01 LAB
GLUCOSE BLDC GLUCOMTR-MCNC: 228 MG/DL — HIGH (ref 70–99)
GLUCOSE BLDC GLUCOMTR-MCNC: 240 MG/DL — HIGH (ref 70–99)

## 2019-12-01 PROCEDURE — 99239 HOSP IP/OBS DSCHRG MGMT >30: CPT

## 2019-12-01 RX ORDER — TAMSULOSIN HYDROCHLORIDE 0.4 MG/1
1 CAPSULE ORAL
Qty: 30 | Refills: 0
Start: 2019-12-01 | End: 2020-01-30

## 2019-12-01 RX ORDER — TAMSULOSIN HYDROCHLORIDE 0.4 MG/1
1 CAPSULE ORAL
Qty: 30 | Refills: 0
Start: 2019-12-01 | End: 2019-12-30

## 2019-12-01 RX ORDER — CIPROFLOXACIN LACTATE 400MG/40ML
1 VIAL (ML) INTRAVENOUS
Qty: 10 | Refills: 0
Start: 2019-12-01 | End: 2019-12-05

## 2019-12-01 RX ORDER — SITAGLIPTIN 50 MG/1
1 TABLET, FILM COATED ORAL
Qty: 0 | Refills: 0 | DISCHARGE

## 2019-12-01 RX ADMIN — OXYCODONE AND ACETAMINOPHEN 2 TABLET(S): 5; 325 TABLET ORAL at 14:42

## 2019-12-01 RX ADMIN — LISINOPRIL 10 MILLIGRAM(S): 2.5 TABLET ORAL at 05:01

## 2019-12-01 RX ADMIN — OXYCODONE AND ACETAMINOPHEN 2 TABLET(S): 5; 325 TABLET ORAL at 14:01

## 2019-12-01 RX ADMIN — Medication 2: at 08:45

## 2019-12-01 RX ADMIN — OXYCODONE AND ACETAMINOPHEN 2 TABLET(S): 5; 325 TABLET ORAL at 07:23

## 2019-12-01 RX ADMIN — Medication 2: at 12:45

## 2019-12-01 RX ADMIN — Medication 6 UNIT(S): at 08:44

## 2019-12-01 RX ADMIN — OXYCODONE AND ACETAMINOPHEN 2 TABLET(S): 5; 325 TABLET ORAL at 08:20

## 2019-12-01 RX ADMIN — AMLODIPINE BESYLATE 10 MILLIGRAM(S): 2.5 TABLET ORAL at 05:01

## 2019-12-01 RX ADMIN — CEFTRIAXONE 100 MILLIGRAM(S): 500 INJECTION, POWDER, FOR SOLUTION INTRAMUSCULAR; INTRAVENOUS at 12:44

## 2019-12-01 RX ADMIN — Medication 6 UNIT(S): at 12:44

## 2019-12-01 NOTE — DISCHARGE NOTE PROVIDER - NSDCCPCAREPLAN_GEN_ALL_CORE_FT
PRINCIPAL DISCHARGE DIAGNOSIS  Diagnosis: UTI (urinary tract infection)  Assessment and Plan of Treatment:       SECONDARY DISCHARGE DIAGNOSES  Diagnosis: BPH with obstruction/lower urinary tract symptoms  Assessment and Plan of Treatment:

## 2019-12-01 NOTE — CONSULT NOTE ADULT - SUBJECTIVE AND OBJECTIVE BOX
Pt is a 79 y.o male who presents with few day history of dysuria, fever, chills and generalized weakness, found to have UTI. Pt seen and examined at bedside. PT states at home prior to this infection, he had no problems urinating. Pt would void 4-5x per day, good stream, no change in stream, moderate amount of urine upon each void, feels empty after voiding, no hesitancy or dribbling. Pt denies any hematuria, pyuria, burning or pain with urination. Pt with + nocturia 2-3x night "sometimes", not every night. Pt had one episode of urinary retention two years ago following his appendectomy, did not follow up with a urologist afterwards. Pt has never seen a Urologist. Pt currently feeling overall improved from admission, still complains of some burning upon urination and hesitancy. PT denies any abdominal or flank pain prior to hospitalization, during hospitalization or actively.     PAST MEDICAL & SURGICAL HISTORY:  HLD (hyperlipidemia)  HTN (hypertension)  Diabetes mellitus  History of knee replacement, total, left  MEDICATIONS  (STANDING):  amLODIPine   Tablet 10 milliGRAM(s) Oral daily  cefTRIAXone   IVPB 1000 milliGRAM(s) IV Intermittent every 24 hours  chlorhexidine 4% Liquid 1 Application(s) Topical <User Schedule>  dextrose 5%. 1000 milliLiter(s) (50 mL/Hr) IV Continuous <Continuous>  dextrose 50% Injectable 12.5 Gram(s) IV Push once  dextrose 50% Injectable 25 Gram(s) IV Push once  dextrose 50% Injectable 25 Gram(s) IV Push once  enoxaparin Injectable 40 milliGRAM(s) SubCutaneous at bedtime  influenza   Vaccine 0.5 milliLiter(s) IntraMuscular once  insulin glargine Injectable (LANTUS) 13 Unit(s) SubCutaneous at bedtime  insulin lispro (HumaLOG) corrective regimen sliding scale   SubCutaneous three times a day before meals  insulin lispro Injectable (HumaLOG) 6 Unit(s) SubCutaneous three times a day before meals  lisinopril 10 milliGRAM(s) Oral daily  Allergies    No Known Allergies    Intolerances    Vital Signs Last 24 Hrs  T(C): 37.1 (01 Dec 2019 07:32), Max: 37.1 (01 Dec 2019 07:32)  T(F): 98.7 (01 Dec 2019 07:32), Max: 98.7 (01 Dec 2019 07:32)  HR: 74 (01 Dec 2019 07:32) (65 - 74)  BP: 136/75 (01 Dec 2019 07:32) (121/64 - 136/75)  BP(mean): --  RR: 18 (01 Dec 2019 07:32) (14 - 18)  SpO2: --    GEN: NAD, awake and alert.   ABDO: soft, NT/ND, no palpable bladder, no suprapubic TTP. no CVAT or flank TTP b/l.     LABS:                      12.9   6.95  )-----------( 167      ( 30 Nov 2019 06:15 )             39.0     140  |  102  |  17  ----------------------------<  250<H>  4.1   |  22  |  0.7    Ca    8.4<L>      30 Nov 2019 06:15  Mg     2.0     11-30    TPro  6.8  /  Alb  3.9  /  TBili  0.4  /  DBili  x   /  AST  9   /  ALT  16  /  AlkPhos  76  11-29    Urinalysis (11.28.19 @ 00:30)    Glucose Qualitative, Urine: >= 1000 mg/dL    Blood, Urine: Small    pH Urine: 6.0    Color: Yellow    Urine Appearance: Clear    Bilirubin: Negative    Ketone - Urine: Small    Specific Gravity: 1.023    Protein, Urine: 100 mg/dL    Urobilinogen: <2 mg/dL    Nitrite: Negative    Leukocyte Esterase Concentration: Negative    Urine Microscopic-Add On (NC) (11.28.19 @ 00:30)    Red Blood Cell - Urine: 2 /HPF    White Blood Cell - Urine: 21 /HPF    Hyaline Casts: 0 /LPF    Bacteria: Many    Epithelial Cells: 0 /HPF    Culture - Urine (11.28.19 @ 00:30)    -  Amikacin: S <=16    -  Ampicillin: R >16 These ampicillin results predict results for amoxicillin    -  Ampicillin/Sulbactam: S <=8/4 Enterobacter, Citrobacter, and Serratia may develop resistance during prolonged therapy (3-4 days)    -  Aztreonam: S <=4    -  Cefazolin: S <=8 (MIC_CL_COM_ENTERIC_CEFAZU) For uncomplicated UTI with K. pneumoniae, E. coli, or P. mirablis: PREM <=16 is sensitive and PREM >=32 is resistant. This also predicts results for oral agents cefaclor, cefdinir, cefpodoxime, cefprozil, cefuroxime axetil, cephalexin and locarbef for uncomplicated UTI. Note that some isolates may be susceptible to these agents while testing resistant to cefazolin.    -  Cefepime: S <=4    -  Cefoxitin: S <=8    -  Ceftriaxone: S <=1 Enterobacter, Citrobacter, and Serratia may develop resistance during prolonged therapy    -  Ciprofloxacin: S <=1    -  Gentamicin: S <=4    -  Imipenem: S <=1    -  Levofloxacin: S <=2    -  Meropenem: S <=1    -  Nitrofurantoin: S <=32 Should not be used to treat pyelonephritis    -  Piperacillin/Tazobactam: S <=16    -  Tigecycline: S <=2    -  Tobramycin: S <=4    -  Trimethoprim/Sulfamethoxazole: S <=2/38    Specimen Source: .Urine Clean Catch (Midstream)    Culture Results:   >100,000 CFU/ml Klebsiella pneumoniae    Organism Identification: Klebsiella pneumoniae    Organism: Klebsiella pneumoniae    Method Type: PREM

## 2019-12-01 NOTE — CONSULT NOTE ADULT - ASSESSMENT
79 y.o male with UTI, mild LUTS, probable BPH.    ·	cont IV abx, convert to PO upon D/C home - culture w/ sensitivity to cipro  ·	Flomax  ·	pyridium for symptomatic relief, for three days only - pt aware his urine may turn orange  ·	OP f/u for PSA, prostate eval and sono to assess for PVR  ·	no acute  intervention  ·	can f/u with Dr Ahn as OP, call for appt [phone] [address] [place] [address], [id number]  ·	d/w Dr Bourne  ·	w/d with attng

## 2019-12-01 NOTE — DISCHARGE NOTE PROVIDER - HOSPITAL COURSE
Patient came to hospital with dysuria found to have UTI with klebsiella pneumoniae. Urine culture is sensitive to ciprofloxacin. Patient was treated with ceftriaxone IV n hospital. He remains hemodynamically stable with normal white count. Discharged home with Ciprofloxacin and follow up out patient with PMD and Urology also for the evaluation of the BPH. Patient came to hospital with dysuria found to have UTI with klebsiella pneumoniae. Urine culture is sensitive to ciprofloxacin. Patient was treated with ceftriaxone IV n hospital. He remains hemodynamically stable with normal white count. Discharged home with Ciprofloxacin, Tamsulosin, Pyridium  and follow up out patient with PMD and Urology also for the evaluation of the BPH. Patient seen by urology Dr. Ahn to follow at [phone]. This was explained to patient in details.

## 2019-12-01 NOTE — DISCHARGE NOTE PROVIDER - CARE PROVIDER_API CALL
Neel Ahn)  Urology  [facility], [place] [address]  Phone: [phone]  Fax: [phone]  Follow Up Time:

## 2019-12-01 NOTE — DISCHARGE NOTE PROVIDER - NSDCMRMEDTOKEN_GEN_ALL_CORE_FT
amLODIPine 10 mg oral tablet: 1 tab(s) orally once a day  Cipro 500 mg oral tablet: 1 tab(s) orally every 12 hours   enalapril 20 mg oral tablet: 1 tab(s) orally once a day  glipiZIDE 5 mg oral tablet: 1 tab(s) orally 2 times a day  metFORMIN 1000 mg oral tablet: 1 tab(s) orally 2 times a day

## 2019-12-01 NOTE — DISCHARGE NOTE NURSING/CASE MANAGEMENT/SOCIAL WORK - PATIENT PORTAL LINK FT
You can access the FollowMyHealth Patient Portal offered by [facility] by registering at the following website: [email or web address]. By joining [employer]’s FollowMyHealth portal, you will also be able to view your health information using other applications (apps) compatible with our system.

## 2019-12-05 DIAGNOSIS — N40.1 BENIGN PROSTATIC HYPERPLASIA WITH LOWER URINARY TRACT SYMPTOMS: ICD-10-CM

## 2019-12-05 DIAGNOSIS — Z79.84 LONG TERM (CURRENT) USE OF ORAL HYPOGLYCEMIC DRUGS: ICD-10-CM

## 2019-12-05 DIAGNOSIS — E11.9 TYPE 2 DIABETES MELLITUS WITHOUT COMPLICATIONS: ICD-10-CM

## 2019-12-05 DIAGNOSIS — R32 UNSPECIFIED URINARY INCONTINENCE: ICD-10-CM

## 2019-12-05 DIAGNOSIS — R35.0 FREQUENCY OF MICTURITION: ICD-10-CM

## 2019-12-05 DIAGNOSIS — N39.0 URINARY TRACT INFECTION, SITE NOT SPECIFIED: ICD-10-CM

## 2019-12-05 DIAGNOSIS — B96.1 KLEBSIELLA PNEUMONIAE [K. PNEUMONIAE] AS THE CAUSE OF DISEASES CLASSIFIED ELSEWHERE: ICD-10-CM

## 2019-12-05 DIAGNOSIS — I10 ESSENTIAL (PRIMARY) HYPERTENSION: ICD-10-CM

## 2019-12-05 DIAGNOSIS — E78.5 HYPERLIPIDEMIA, UNSPECIFIED: ICD-10-CM

## 2019-12-05 LAB
CULTURE RESULTS: SIGNIFICANT CHANGE UP
SPECIMEN SOURCE: SIGNIFICANT CHANGE UP

## 2020-01-01 ENCOUNTER — EMERGENCY (EMERGENCY)
Facility: HOSPITAL | Age: 80
LOS: 0 days | Discharge: HOME | End: 2020-01-01
Admitting: EMERGENCY MEDICINE
Payer: MEDICARE

## 2020-01-01 VITALS
SYSTOLIC BLOOD PRESSURE: 155 MMHG | WEIGHT: 162.04 LBS | DIASTOLIC BLOOD PRESSURE: 71 MMHG | TEMPERATURE: 98 F | HEART RATE: 66 BPM | RESPIRATION RATE: 18 BRPM | OXYGEN SATURATION: 97 %

## 2020-01-01 DIAGNOSIS — Z76.0 ENCOUNTER FOR ISSUE OF REPEAT PRESCRIPTION: ICD-10-CM

## 2020-01-01 DIAGNOSIS — Z96.652 PRESENCE OF LEFT ARTIFICIAL KNEE JOINT: Chronic | ICD-10-CM

## 2020-01-01 DIAGNOSIS — I10 ESSENTIAL (PRIMARY) HYPERTENSION: ICD-10-CM

## 2020-01-01 DIAGNOSIS — Z79.2 LONG TERM (CURRENT) USE OF ANTIBIOTICS: ICD-10-CM

## 2020-01-01 DIAGNOSIS — Z79.891 LONG TERM (CURRENT) USE OF OPIATE ANALGESIC: ICD-10-CM

## 2020-01-01 DIAGNOSIS — E11.9 TYPE 2 DIABETES MELLITUS WITHOUT COMPLICATIONS: ICD-10-CM

## 2020-01-01 DIAGNOSIS — Z79.899 OTHER LONG TERM (CURRENT) DRUG THERAPY: ICD-10-CM

## 2020-01-01 DIAGNOSIS — Z79.84 LONG TERM (CURRENT) USE OF ORAL HYPOGLYCEMIC DRUGS: ICD-10-CM

## 2020-01-01 PROCEDURE — 99282 EMERGENCY DEPT VISIT SF MDM: CPT

## 2020-01-01 NOTE — ED PROVIDER NOTE - PHYSICAL EXAMINATION
CONST: Well appearing in NAD  CARD: Normal S1 S2; Normal rate and rhythm  RESP: Equal BS B/L, No wheezes, rhonchi or rales. No distress  GI: Soft, non-tender, non-distended. no cva tenderness. normal BS  MS: Normal ROM in all extremities. No midline spinal tenderness. pulses 2 +. no calf tenderness or swelling  SKIN: Warm, dry, no acute rashes. Good turgor

## 2020-01-01 NOTE — ED PROVIDER NOTE - PATIENT PORTAL LINK FT
You can access the FollowMyHealth Patient Portal offered by [facility] by registering at the following website: [email or web address]. By joining [employer]’s FollowMyHealth portal, you will also be able to view your health information using other applications (apps) compatible with our system.

## 2020-01-01 NOTE — ED PROVIDER NOTE - CLINICAL SUMMARY MEDICAL DECISION MAKING FREE TEXT BOX
Pt presenting for medication refill, refill given, will follow up with urology. return precautions given to pt

## 2020-01-01 NOTE — ED PROVIDER NOTE - OBJECTIVE STATEMENT
79 year old male with pmhx of DM and HTN, recent admission for UTI 1 month ago, presents for refill of flomax. pt was diagnosed with flomax, in which he finished yesterday. pt has appt in 1 month with urologist and states flomax was helping him. no abd pain, N/V/D, fever, dysuria, or hematuria.

## 2020-01-01 NOTE — ED PROVIDER NOTE - NS ED ROS FT
Review of Systems:  	•	CONSTITUTIONAL - no fever, no diaphoresis, no chills  	•	SKIN - no rash  	•	HEMATOLOGIC - no bleeding, no bruising  	•	EYES - no eye pain, no blurry vision  	•	ENT - no change in hearing, no sore throat, no ear pain or tinnitus  	•	RESPIRATORY - no shortness of breath, no cough  	•	CARDIAC - no chest pain, no palpitations  	•	GI - no abd pain, no nausea, no vomiting, no diarrhea, no constipation  	•	GENITO-URINARY - no discharge, no dysuria; no hematuria, no increased urinary frequency  	•	MUSCULOSKELETAL - no joint paint, no swelling, no redness

## 2020-01-11 ENCOUNTER — EMERGENCY (EMERGENCY)
Facility: HOSPITAL | Age: 80
LOS: 0 days | Discharge: HOME | End: 2020-01-11
Admitting: EMERGENCY MEDICINE
Payer: MEDICARE

## 2020-01-11 VITALS
TEMPERATURE: 98 F | HEART RATE: 62 BPM | DIASTOLIC BLOOD PRESSURE: 74 MMHG | RESPIRATION RATE: 18 BRPM | WEIGHT: 156.97 LBS | SYSTOLIC BLOOD PRESSURE: 157 MMHG | OXYGEN SATURATION: 99 %

## 2020-01-11 DIAGNOSIS — M25.572 PAIN IN LEFT ANKLE AND JOINTS OF LEFT FOOT: ICD-10-CM

## 2020-01-11 DIAGNOSIS — Z96.652 PRESENCE OF LEFT ARTIFICIAL KNEE JOINT: Chronic | ICD-10-CM

## 2020-01-11 PROCEDURE — 73610 X-RAY EXAM OF ANKLE: CPT | Mod: 26,LT

## 2020-01-11 PROCEDURE — 99283 EMERGENCY DEPT VISIT LOW MDM: CPT

## 2020-01-11 RX ORDER — KETOROLAC TROMETHAMINE 30 MG/ML
30 SYRINGE (ML) INJECTION ONCE
Refills: 0 | Status: DISCONTINUED | OUTPATIENT
Start: 2020-01-11 | End: 2020-01-11

## 2020-01-11 RX ADMIN — Medication 30 MILLIGRAM(S): at 21:52

## 2020-01-11 NOTE — ED PROVIDER NOTE - PHYSICAL EXAMINATION
CONST: Well appearing in NAD  MS: Normal ROM in all extremities. No midline spinal tenderness. no calf tenderness or swelling. DP/PT pulses intact.   SKIN: Warm, dry, no acute rashes.   NEURO: A&Ox3, No focal deficits. Strength 5/5 LE bilaterally with no sensory deficits. Steady gait.

## 2020-01-11 NOTE — ED PROVIDER NOTE - NS ED ROS FT
Review of Systems:  	•	CONSTITUTIONAL: no fever, no diaphoresis, no chills  	•	SKIN: no rash  	•	HEMATOLOGIC: no bleeding, no bruising  	•	RESPIRATORY: no shortness of breath, no cough  	•	CARDIAC: no chest pain, no palpitations  	•	MUSCULOSKELETAL: +L foot pain, no swelling, no redness   	•	NEUROLOGIC: no weakness, no paresthesias

## 2020-01-11 NOTE — ED PROVIDER NOTE - OBJECTIVE STATEMENT
79 year old male, past medical history DM, hyperlipidemia, HTN, who presents with mild, intermittent, non-radiating L ankle foot pain x6 months. Patient reports pain worse with ambulation, alleviated with rest. Has been seen in another ED for same x1 yr ago, had XR which was negative. Denies fever, chills, knee pain, hip pain, back pain. Denies trauma or falls.

## 2020-01-11 NOTE — ED PROVIDER NOTE - PATIENT PORTAL LINK FT
You can access the FollowMyHealth Patient Portal offered by [facility] by registering at the following website: [email or web address]. By joining [employer]’s FollowMyHealth portal, you will also be able to view your health information using other applications (apps) compatible with our system.

## 2020-01-11 NOTE — ED PROVIDER NOTE - NSFOLLOWUPCLINICS_GEN_ALL_ED_FT
[facility]  Orthpedic  [address], [place]   Phone: [phone]  Fax:   Follow Up Time: 1-3 Days    [facility]  Medicine  [address], [place]   Phone: [phone]  Fax:   Follow Up Time: 1-3 Days

## 2020-01-11 NOTE — ED PROVIDER NOTE - NSFOLLOWUPINSTRUCTIONS_ED_ALL_ED_FT
Please follow-up with your primary care doctor and orthopedics in 1-3 days  Please be aware of any new or worsening signs or symptoms that should prompt return to the emergency department       Ankle Pain    Many things can cause ankle pain, including an injury to the area and overuse of the ankle. The ankle joint holds your body weight and allows you to move around. Ankle pain can occur on either side or the back of one ankle or both ankles. Ankle pain may be sharp and burning or dull and aching. There may be tenderness, stiffness, redness, or warmth around the ankle.     HOME CARE INSTRUCTIONS  Activity    Rest your ankle as told by your health care provider. Avoid any activities that cause ankle pain.  Do exercises as told by your health care provider.  Ask your health care provider if you can drive.    Using a Brace, a Bandage, or Crutches    If you were given a brace:  Wear it as told by your health care provider.  Remove it when you take a bath or a shower.  Try not to move your ankle very much, but wiggle your toes from time to time. This helps to prevent swelling.  If you were given an elastic bandage:  Remove it when you take a bath or a shower.  Try not to move your ankle very much, but wiggle your toes from time to time. This helps to prevent swelling.  Adjust the bandage to make it more comfortable if it feels too tight.  Loosen the bandage if you have numbness or tingling in your foot or if your foot turns cold and blue.  If you have crutches, use them as told by your health care provider. Continue to use them until you can walk without feeling pain in your ankle.    Managing Pain, Stiffness, and Swelling    Raise (elevate) your ankle above the level of your heart while you are sitting or lying down.  If directed, apply ice to the area:  Put ice in a plastic bag.  Place a towel between your skin and the bag.  Leave the ice on for 20 minutes, 2–3 times per day.    General Instructions    Keep all follow-up visits as told by your health care provider. This is important.  Record this information that may be helpful for you and your health care provider:  How often you have ankle pain.  Where the pain is located.  What the pain feels like.  Take over-the-counter and prescription medicines only as told by your health care provider.    SEEK MEDICAL CARE IF:  Your pain gets worse.  Your pain is not relieved with medicines.  You have a fever or chills.  You are having more trouble with walking.  You have new symptoms.    SEEK IMMEDIATE MEDICAL CARE IF:  Your foot, leg, toes, or ankle tingles or becomes numb.  Your foot, leg, toes, or ankle becomes swollen.  Your foot, leg, toes, or ankle turns pale or blue.    ADDITIONAL NOTES AND INSTRUCTIONS    Please follow up with your Primary MD in 24-48 hr.  Seek immediate medical care for any new/worsening signs or symptoms.

## 2020-01-11 NOTE — ED PROVIDER NOTE - PROGRESS NOTE DETAILS
Results d/w patient and copies of results provided.  Pt instructed to return if any worsening symptoms or concerns.  Discussed with patient follow up and care plan. They verbalize understanding all discussed and all questions answered.

## 2020-01-30 PROBLEM — Z00.00 ENCOUNTER FOR PREVENTIVE HEALTH EXAMINATION: Status: ACTIVE | Noted: 2020-01-30

## 2020-02-03 ENCOUNTER — APPOINTMENT (OUTPATIENT)
Dept: UROLOGY | Facility: CLINIC | Age: 80
End: 2020-02-03
Payer: MEDICARE

## 2020-02-03 VITALS
WEIGHT: 155 LBS | HEIGHT: 66 IN | HEART RATE: 72 BPM | SYSTOLIC BLOOD PRESSURE: 144 MMHG | DIASTOLIC BLOOD PRESSURE: 85 MMHG | BODY MASS INDEX: 24.91 KG/M2

## 2020-02-03 DIAGNOSIS — E78.00 PURE HYPERCHOLESTEROLEMIA, UNSPECIFIED: ICD-10-CM

## 2020-02-03 DIAGNOSIS — R81 GLYCOSURIA: ICD-10-CM

## 2020-02-03 DIAGNOSIS — Z78.9 OTHER SPECIFIED HEALTH STATUS: ICD-10-CM

## 2020-02-03 DIAGNOSIS — N39.0 URINARY TRACT INFECTION, SITE NOT SPECIFIED: ICD-10-CM

## 2020-02-03 DIAGNOSIS — E11.9 TYPE 2 DIABETES MELLITUS W/OUT COMPLICATIONS: ICD-10-CM

## 2020-02-03 DIAGNOSIS — I10 ESSENTIAL (PRIMARY) HYPERTENSION: ICD-10-CM

## 2020-02-03 DIAGNOSIS — Z87.898 PERSONAL HISTORY OF OTHER SPECIFIED CONDITIONS: ICD-10-CM

## 2020-02-03 LAB
BILIRUB UR QL STRIP: ABNORMAL
CLARITY UR: NORMAL
COLLECTION METHOD: NORMAL
GLUCOSE UR-MCNC: ABNORMAL
HCG UR QL: 2 MG/DL
HGB UR QL STRIP.AUTO: NORMAL
KETONES UR-MCNC: NORMAL
LEUKOCYTE ESTERASE UR QL STRIP: NORMAL
NITRITE UR QL STRIP: NORMAL
PH UR STRIP: 5
PROT UR STRIP-MCNC: NORMAL
SP GR UR STRIP: 1.02

## 2020-02-03 PROCEDURE — 99204 OFFICE O/P NEW MOD 45 MIN: CPT

## 2020-02-03 PROCEDURE — 81003 URINALYSIS AUTO W/O SCOPE: CPT | Mod: QW

## 2020-02-03 RX ORDER — AMLODIPINE BESYLATE 10 MG/1
10 TABLET ORAL
Refills: 0 | Status: ACTIVE | COMMUNITY

## 2020-02-03 RX ORDER — SITAGLIPTIN 100 MG/1
100 TABLET, FILM COATED ORAL
Refills: 0 | Status: ACTIVE | COMMUNITY

## 2020-02-03 RX ORDER — ATORVASTATIN CALCIUM 40 MG/1
40 TABLET, FILM COATED ORAL
Refills: 0 | Status: ACTIVE | COMMUNITY

## 2020-02-03 RX ORDER — GLIPIZIDE 10 MG/1
10 TABLET ORAL
Refills: 0 | Status: ACTIVE | COMMUNITY

## 2020-02-03 RX ORDER — ENALAPRIL MALEATE 20 MG/1
20 TABLET ORAL
Refills: 0 | Status: ACTIVE | COMMUNITY

## 2020-02-03 RX ORDER — METFORMIN HYDROCHLORIDE 1000 MG/1
1000 TABLET, COATED ORAL
Refills: 0 | Status: ACTIVE | COMMUNITY

## 2020-02-03 NOTE — ASSESSMENT
[FreeTextEntry1] : ROBERT REYES is a 79 year old male hx DM who presents with history of urinary tract infection, BPH/LUTS, glucosuria.\par Recommend continue tamsulosin\par Recommend tight glucose control\par fu w bladder sono w pvr

## 2020-02-03 NOTE — PHYSICAL EXAM
[General Appearance - Well Nourished] : well nourished [General Appearance - Well Developed] : well developed [Well Groomed] : well groomed [Normal Appearance] : normal appearance [General Appearance - In No Acute Distress] : no acute distress [Edema] : no peripheral edema [Exaggerated Use Of Accessory Muscles For Inspiration] : no accessory muscle use [Respiration, Rhythm And Depth] : normal respiratory rhythm and effort [Abdomen Soft] : soft [Abdomen Tenderness] : non-tender [Costovertebral Angle Tenderness] : no ~M costovertebral angle tenderness [Urethral Meatus] : meatus normal [Urinary Bladder Findings] : the bladder was normal on palpation [Scrotum] : the scrotum was normal [No Prostate Nodules] : no prostate nodules [Testes Mass (___cm)] : there were no testicular masses [Normal Station and Gait] : the gait and station were normal for the patient's age [Prostate Size ___ gm] : prostate size [unfilled] gm [] : no rash [No Focal Deficits] : no focal deficits [Affect] : the affect was normal [Oriented To Time, Place, And Person] : oriented to person, place, and time [Mood] : the mood was normal [No Palpable Adenopathy] : no palpable adenopathy [Not Anxious] : not anxious

## 2020-02-03 NOTE — HISTORY OF PRESENT ILLNESS
[FreeTextEntry1] : ROBERT REYES is a 79 year old male who presents with history of urinary tract infection, BPH/LUTS. \par Nov 2019 patient began to experience worsening lower urinary tract symptoms and dysuria as an emergency room where he was found to have urinary tract infection.\par He was prescribed antibiotics and reports that his symptoms resolved. He is to started Flomax and reports significant improvement in his lower urinary tract symptoms.\par Previously had nocturia over 4 times along with obstructive and irritative voiding symptoms however now reports resolution of nocturia and irritative and obstructive voiding symptoms.\par Denies gross hematuria, dysuria or associated symptoms. Denies pelvic pain.\par Udip 2/3/20 shows glucosuria without microheme or pyurai\par \par Denies  PMH including previous kidney stones, recurrent UTIs. \par Family History: No  malignancies\par Soc hx retired\par \par Old records reviewed\par PSA 3.11 9/2019\par UCx klebsiella prescott sensitive >100,000 cfu 11/2019\par UA neg for 3 or greater RBCs

## 2020-02-12 ENCOUNTER — FORM ENCOUNTER (OUTPATIENT)
Age: 80
End: 2020-02-12

## 2020-02-13 ENCOUNTER — OUTPATIENT (OUTPATIENT)
Dept: OUTPATIENT SERVICES | Facility: HOSPITAL | Age: 80
LOS: 1 days | Discharge: HOME | End: 2020-02-13
Payer: MEDICARE

## 2020-02-13 DIAGNOSIS — N40.1 BENIGN PROSTATIC HYPERPLASIA WITH LOWER URINARY TRACT SYMPTOMS: ICD-10-CM

## 2020-02-13 DIAGNOSIS — Z96.652 PRESENCE OF LEFT ARTIFICIAL KNEE JOINT: Chronic | ICD-10-CM

## 2020-02-13 PROCEDURE — 76857 US EXAM PELVIC LIMITED: CPT | Mod: 26

## 2020-05-11 ENCOUNTER — APPOINTMENT (OUTPATIENT)
Dept: UROLOGY | Facility: CLINIC | Age: 80
End: 2020-05-11
Payer: MEDICARE

## 2020-05-11 VITALS
SYSTOLIC BLOOD PRESSURE: 140 MMHG | DIASTOLIC BLOOD PRESSURE: 77 MMHG | TEMPERATURE: 97.2 F | HEIGHT: 66 IN | WEIGHT: 155 LBS | HEART RATE: 77 BPM | BODY MASS INDEX: 24.91 KG/M2

## 2020-05-11 DIAGNOSIS — Z87.440 PERSONAL HISTORY OF URINARY (TRACT) INFECTIONS: ICD-10-CM

## 2020-05-11 PROCEDURE — 99214 OFFICE O/P EST MOD 30 MIN: CPT

## 2020-05-11 NOTE — ASSESSMENT
[FreeTextEntry1] : ROBERT REYES is a 79 year old male hx DM who presents with history of urinary tract infection, BPH/LUTS, glucosuria now LUTS resolved on flomax.\par Recommend cont flomax\par fu 1 year

## 2020-05-11 NOTE — PHYSICAL EXAM
[General Appearance - Well Developed] : well developed [General Appearance - Well Nourished] : well nourished [Well Groomed] : well groomed [Normal Appearance] : normal appearance [General Appearance - In No Acute Distress] : no acute distress [] : no respiratory distress [Edema] : no peripheral edema [Oriented To Time, Place, And Person] : oriented to person, place, and time [Respiration, Rhythm And Depth] : normal respiratory rhythm and effort [Exaggerated Use Of Accessory Muscles For Inspiration] : no accessory muscle use [Affect] : the affect was normal [Mood] : the mood was normal [Normal Station and Gait] : the gait and station were normal for the patient's age [Not Anxious] : not anxious [No Focal Deficits] : no focal deficits

## 2020-05-11 NOTE — HISTORY OF PRESENT ILLNESS
[FreeTextEntry1] : ROBERT REYES is a 79 year old male hx DM who presents with history of urinary tract infection, BPH/LUTS, glucosuria.\par \par Patient states his lower urinary tract symptoms have improved, nocturia one time. Good force of stream and no daytime frequency or urgency.\par \par Bladder sono images visualized the ureter 2020 demonstrating 65 g prostate with post with residual 3 mL. The lateral thickening.\par \par Previously Nov 2019 patient began to experience worsening lower urinary tract symptoms and dysuria as an emergency room where he was found to have urinary tract infection.\par He was prescribed antibiotics and reports that his symptoms resolved. He is to started Flomax and reports significant improvement in his lower urinary tract symptoms.\par Denies gross hematuria, dysuria or associated symptoms. Denies pelvic pain.\par Udip 2/3/20 shows glucosuria without microheme or pyurai\par \par Denies  PMH including previous kidney stones, recurrent UTIs. \par Family History: No  malignancies\par Soc hx retired\par \par Old records reviewed\par PSA 3.11 9/2019\par UCx klebsiella prescott sensitive >100,000 cfu 11/2019\par UA neg for 3 or greater RBCs

## 2020-08-12 ENCOUNTER — EMERGENCY (EMERGENCY)
Facility: HOSPITAL | Age: 80
LOS: 0 days | Discharge: HOME | End: 2020-08-12
Attending: EMERGENCY MEDICINE | Admitting: EMERGENCY MEDICINE
Payer: MEDICARE

## 2020-08-12 VITALS
TEMPERATURE: 98 F | RESPIRATION RATE: 17 BRPM | DIASTOLIC BLOOD PRESSURE: 75 MMHG | SYSTOLIC BLOOD PRESSURE: 156 MMHG | OXYGEN SATURATION: 99 % | HEART RATE: 71 BPM

## 2020-08-12 DIAGNOSIS — Z96.652 PRESENCE OF LEFT ARTIFICIAL KNEE JOINT: Chronic | ICD-10-CM

## 2020-08-12 DIAGNOSIS — Z96.652 PRESENCE OF LEFT ARTIFICIAL KNEE JOINT: ICD-10-CM

## 2020-08-12 DIAGNOSIS — I10 ESSENTIAL (PRIMARY) HYPERTENSION: ICD-10-CM

## 2020-08-12 DIAGNOSIS — M54.42 LUMBAGO WITH SCIATICA, LEFT SIDE: ICD-10-CM

## 2020-08-12 DIAGNOSIS — Z87.39 PERSONAL HISTORY OF OTHER DISEASES OF THE MUSCULOSKELETAL SYSTEM AND CONNECTIVE TISSUE: ICD-10-CM

## 2020-08-12 DIAGNOSIS — M54.5 LOW BACK PAIN: ICD-10-CM

## 2020-08-12 DIAGNOSIS — E11.9 TYPE 2 DIABETES MELLITUS WITHOUT COMPLICATIONS: ICD-10-CM

## 2020-08-12 DIAGNOSIS — E78.5 HYPERLIPIDEMIA, UNSPECIFIED: ICD-10-CM

## 2020-08-12 PROCEDURE — 99284 EMERGENCY DEPT VISIT MOD MDM: CPT

## 2020-08-12 NOTE — ED PROVIDER NOTE - NSFOLLOWUPCLINICS_GEN_ALL_ED_FT
[facility] ([facility])  Rehabilitation  [facility] [address], [address] [place] [address]  Phone: [phone]  Fax:   Follow Up Time: 1-3 Days

## 2020-08-12 NOTE — ED PROVIDER NOTE - OBJECTIVE STATEMENT
81 yo M with pmhx of DM, HTN, arthritis, neuropathy c/o non-radiating cramping left sided lower back pain x 1 week. Pain is worse in the morning, late at night, and when ambulating. Tried lidocaine patch and advil with mild improvement. 81 yo M with pmhx of DM, HTN, arthritis, left knee replacement (ambulates with cane at baseline) neuropathy c/o non-radiating cramping left sided lower back pain x 1 week. Pain is worse in the morning, late at night, and when ambulating. Tried lidocaine patch with mild improvement. No cp, sob, fever, chills, abdominal pain, nausea, vomiting, diarrhea, back pain, urinary symptoms, urinary or bowel incontinence, headache, dizziness, paresthesias, or weakness.

## 2020-08-12 NOTE — ED PROVIDER NOTE - PATIENT PORTAL LINK FT
Full/Upper/Lower You can access the FollowMyHealth Patient Portal offered by [facility] by registering at the following website: [email or web address]. By joining [employer]’s FollowMyHealth portal, you will also be able to view your health information using other applications (apps) compatible with our system.

## 2020-08-12 NOTE — ED PROVIDER NOTE - ATTENDING CONTRIBUTION TO CARE
81 yo M pmh of DM, arthritis, left knee replacement presents with left lower back pain. Pain started last week and has not gone away. no improvement or worsening pain. Able to ambulate. no numbness, tingling or weakness. pain is left lower back, sharp, radiating down the back of the leg, worse with movement, 6/10. no changes in Bm or urination.     CONSTITUTIONAL: Well-developed; well-nourished; in no acute distress.   SKIN: warm, dry  HEAD: Normocephalic; atraumatic.  EYES: PERRL, EOMI, no conjunctival erythema  ENT: No nasal discharge; airway clear.  NECK: Supple; non tender.  CARD: S1, S2 normal;  Regular rate and rhythm.   RESP: No wheezes, rales or rhonchi.  ABD: soft non tender, non distended, no rebound or guarding  EXT: Normal ROM.  normal gait, 5/5 strength in all 4 extremities.   LYMPH: No acute cervical adenopathy.  NEURO: Alert, oriented, grossly unremarkable. equal sensation bilaterally   PSYCH: Cooperative, appropriate.

## 2020-08-12 NOTE — ED ADULT NURSE NOTE - NSIMPLEMENTINTERV_GEN_ALL_ED
Implemented All Universal Safety Interventions:  [place] to call system. Call bell, personal items and telephone within reach. Instruct patient to call for assistance. Room bathroom lighting operational. Non-slip footwear when patient is off stretcher. Physically safe environment: no spills, clutter or unnecessary equipment. Stretcher in lowest position, wheels locked, appropriate side rails in place.

## 2020-08-12 NOTE — ED PROVIDER NOTE - PHYSICAL EXAMINATION
VITAL SIGNS: I have reviewed nursing notes and confirm.  CONSTITUTIONAL: Well-developed; well-nourished; in no acute distress.  SKIN: Skin exam is warm and dry, no acute rash.  HEAD: Normocephalic; atraumatic.  EYES: PERRL, EOM intact; conjunctiva and sclera clear.  ENT: No nasal discharge; airway clear.   NECK: Supple; non tender.  CARD: S1, S2 normal; no murmurs, gallops, or rubs. Regular rate and rhythm.  RESP: Clear to auscultation bilaterally. No wheezes, rales or rhonchi.  ABD: Normal bowel sounds; soft; non-distended; non-tender.   EXT/MSK: Normal ROM. No edema. +Tenderness to left sciatic notch. No midline tenderness.   LYMPH: No acute cervical adenopathy.  NEURO: Alert, oriented. Grossly unremarkable. No focal deficits.  PSYCH: Cooperative, appropriate.

## 2020-08-12 NOTE — ED PROVIDER NOTE - NS ED ROS FT
Review of Systems:  	•	CONSTITUTIONAL - no fever, no diaphoresis, no chills  	•	SKIN - no rash  	•	HEMATOLOGIC - no bleeding, no bruising  	•	EYES - no eye pain, no blurry vision  	•	ENT - no congestion  	•	RESPIRATORY - no shortness of breath, no cough  	•	CARDIAC - no chest pain, no palpitations  	•	GI - no abd pain, no nausea, no vomiting, no diarrhea, no constipation  	•	GENITO-URINARY - no dysuria; no hematuria, no increased urinary frequency  	•	MUSCULOSKELETAL - +back pain, no joint paint, no swelling, no redness  	•	NEUROLOGIC - no weakness, no headache, no paresthesias, no LOC  	•	PSYCH - no anxiety, no depression  	All other ROS are negative except as documented in HPI.

## 2020-08-12 NOTE — ED PROVIDER NOTE - CLINICAL SUMMARY MEDICAL DECISION MAKING FREE TEXT BOX
Patient presents with back pain. normal exam. Able to ambulate with normal gait. Robaxin given. Patient refused nsaids. Discharged with rehab and neurosurgery follow up. Return precautions discussed.

## 2020-08-12 NOTE — ED ADULT TRIAGE NOTE - CHIEF COMPLAINT QUOTE
pt c.o left sided back pain that began 1 week ago. pt denies any injury to the area. pt has no urinary symptoms

## 2020-08-12 NOTE — ED ADULT NURSE NOTE - OBJECTIVE STATEMENT
As per pt, "About 10 days ago, I started to have back pain, I can't sleep on the bed, I have to sleep on the hard floor."

## 2020-09-21 NOTE — ED PROVIDER NOTE - DISPOSITION TYPE
Continue statin and ASA. BP well controlled.   
Controlled. Continue current medication.  
Rechecking lipid panel today. Continue statin.  
DISCHARGE

## 2020-12-23 PROBLEM — Z87.440 HISTORY OF URINARY TRACT INFECTION: Status: RESOLVED | Noted: 2020-02-03 | Resolved: 2020-12-23

## 2021-05-11 ENCOUNTER — APPOINTMENT (OUTPATIENT)
Dept: UROLOGY | Facility: CLINIC | Age: 81
End: 2021-05-11

## 2021-10-25 ENCOUNTER — APPOINTMENT (OUTPATIENT)
Dept: UROLOGY | Facility: CLINIC | Age: 81
End: 2021-10-25
Payer: MEDICARE

## 2021-10-25 VITALS — HEIGHT: 66 IN | BODY MASS INDEX: 25.71 KG/M2 | WEIGHT: 160 LBS

## 2021-10-25 PROCEDURE — 99213 OFFICE O/P EST LOW 20 MIN: CPT

## 2021-10-25 RX ORDER — TAMSULOSIN HYDROCHLORIDE 0.4 MG/1
0.4 CAPSULE ORAL
Qty: 90 | Refills: 3 | Status: ACTIVE | COMMUNITY
Start: 1900-01-01 | End: 1900-01-01

## 2021-10-25 NOTE — ADDENDUM
[FreeTextEntry1] : Patient's note was transcribed with the assistance of a medical scribe under the supervision of Dr. Hankins.\par I, Dr. Hankins, have reviewed the patient's chart and agree that it aligns with my medical decisions.\par Trinh Kahn, our scribe, also served as a chaperone for physical examination purposes.\par \par \par

## 2021-10-25 NOTE — ASSESSMENT
[FreeTextEntry1] : ROBERT REYES is a 81 year old male hx DM who presents with history of urinary tract infection, BPH/LUTS, glucosuria now LUTS resolved on flomax.\par \par Plan: \par continue flomax for nonbothersome LUTS \par f/u prn \par

## 2021-10-25 NOTE — HISTORY OF PRESENT ILLNESS
[FreeTextEntry1] : ROBERT REYES is a 81 year old male hx DM who presents with history of urinary tract infection, BPH/LUTS, glucosuria now LUTS resolved on flomax.\par \par Pt reports he is doing well on flomax. reports no side effects with the medication. reports nocturia 1x, non bothersome LUTS at this time. Denies gross hematuria at this time. \par Pt reports he got the DM2 under control and reportedly had w/u done with PCP, with normal results, as per pt. \par \par Previously:\par Patient states his lower urinary tract symptoms have improved, nocturia one time. Good force of stream and no daytime frequency or urgency.\par \par Bladder sono images visualized the ureter 2020 demonstrating 65 g prostate with post with residual 3 mL. The lateral thickening.\par \par Previously Nov 2019 patient began to experience worsening lower urinary tract symptoms and dysuria as an emergency room where he was found to have urinary tract infection.\par He was prescribed antibiotics and reports that his symptoms resolved. He is to started Flomax and reports significant improvement in his lower urinary tract symptoms.\par Denies gross hematuria, dysuria or associated symptoms. Denies pelvic pain.\par Udip 2/3/20 shows glucosuria without microheme or pyurai\par \par Denies  PMH including previous kidney stones, recurrent UTIs. \par Family History: No  malignancies\par Soc hx retired\par \par Old records reviewed\par PSA 3.11 9/2019\par UCx klebsiella prescott sensitive >100,000 cfu 11/2019\par UA neg for 3 or greater RBCs

## 2022-01-10 ENCOUNTER — APPOINTMENT (OUTPATIENT)
Dept: UROLOGY | Facility: CLINIC | Age: 82
End: 2022-01-10

## 2022-02-08 ENCOUNTER — EMERGENCY (EMERGENCY)
Facility: HOSPITAL | Age: 82
LOS: 0 days | Discharge: HOME | End: 2022-02-08
Attending: EMERGENCY MEDICINE
Payer: MEDICARE

## 2022-02-08 VITALS
DIASTOLIC BLOOD PRESSURE: 81 MMHG | OXYGEN SATURATION: 98 % | RESPIRATION RATE: 20 BRPM | SYSTOLIC BLOOD PRESSURE: 182 MMHG | HEIGHT: 66 IN | HEART RATE: 73 BPM | TEMPERATURE: 97 F | WEIGHT: 160.06 LBS

## 2022-02-08 DIAGNOSIS — Z96.652 PRESENCE OF LEFT ARTIFICIAL KNEE JOINT: Chronic | ICD-10-CM

## 2022-02-08 PROCEDURE — 73620 X-RAY EXAM OF FOOT: CPT | Mod: 26,RT

## 2022-02-08 PROCEDURE — 99283 EMERGENCY DEPT VISIT LOW MDM: CPT

## 2022-02-08 NOTE — ED PROVIDER NOTE - PATIENT PORTAL LINK FT
You can access the FollowMyHealth Patient Portal offered by [facility] by registering at the following website: [email or web address]. By joining [employer]’s FollowMyHealth portal, you will also be able to view your health information using other applications (apps) compatible with our system.

## 2022-02-08 NOTE — ED PROVIDER NOTE - ATTENDING CONTRIBUTION TO CARE
81-year-old man, history of hypertension, diabetes, arthritis, complains of bilateral lower extremity swelling over the last week or so.  Patient says he is normally very active, walks every day, and he also complains that over the last 3 days his right foot has been hurting.  He wonders if he is "overdoing it."  He follows with Dr. Houser, last lab work was a few months ago and normal.  No fever or chills.  No chest pain and no shortness of breath no orthopnea or PND.  Patient reports that swelling is usually worse after patient has been up and about during the day, it improves overnight.  On exam patient is very well-appearing, looks younger than his stated age.  Lungs clear, CV S1-S2 RRR, abdomen soft.  There is trace bilateral edema to the ankles.  Additionally there is tenderness to the base of the right fifth metatarsal, no overlying erythema or swelling there.  Doubt kidney or liver disease given fairly recent normal lab work.  Doubt cardiac etiology as patient is very active with no shortness of breath.  Symptoms more likely due to venous insufficiency.  We will x-ray the foot, could be hairline or stress fracture, reassess.

## 2022-02-08 NOTE — ED ADULT TRIAGE NOTE - CHIEF COMPLAINT QUOTE
c/o bilateral ankle swelling x last night, denies any other complaints, Hx diabetes/HTN c/o bilateral ankle swelling x last night, denies SOB, denies any other complaints, Hx diabetes/HTN

## 2022-02-08 NOTE — ED PROVIDER NOTE - MUSCULOSKELETAL MINIMAL EXAM
Right foot point tenderness towards the base of the fourth and fifth metatarsal. No limitation of range of motion./motor intact

## 2022-02-08 NOTE — ED PROVIDER NOTE - CARE PROVIDER_API CALL
Feliciano Estrada (DPM)  Foot Surgery; Podiatric Medicine  [address] [place] [address], [address] [place] [address]  Phone: [phone]  Fax: [phone]  Follow Up Time: Routine

## 2022-02-08 NOTE — ED PROVIDER NOTE - OBJECTIVE STATEMENT
Patient is an 82 y/o male with pmhx of DM, HTN, and arthritis who presents after experiencing bilateral ankle and lower leg swelling along with tenderness of his right foot. States he noticed the swelling 3 days ago after doing his routine exercise. States the swelling has gone down a little from the weekend. Also states he feels some localized pain of his right foot. Denies any associated numbness, tingling, or erythema. Denies any recent trauma to his foot but believes he may have over-exerted himself at the gym.

## 2022-02-08 NOTE — ED ADULT TRIAGE NOTE - BP NONINVASIVE SYSTOLIC (MM HG)
fields.   Abdomen: Soft, non-tender, non-distended  Skin: warm, dry  Extremities: pulses 2+ radial / dorsalis pedis, no clubbing, cyanosis, edema    IMPRESSION:   COPD exacerbation  Viral illness    PLAN:   Symptomatic treatment and discharge    CRITICAL CARE TIME:    None    Brit Carey DO  Senior Resident Physician    (Please note that portions of this note were completed with a voice recognition program.  Efforts were made to edit the dictations but occasionally words are mis-transcribed.)        Zhang Donato DO  Resident  04/26/21 [id number]

## 2022-02-08 NOTE — ED PROVIDER NOTE - CLINICAL SUMMARY MEDICAL DECISION MAKING FREE TEXT BOX
X-ray looks negative for fracture.  Will Ace wrap and hard sole shoe for comfort.  Patient has follow-up with Dr. Reeder next week.  Advised podiatry follow-up as well.  Return precautions discussed.  Patient is comfortable with discharge.

## 2022-04-28 ENCOUNTER — EMERGENCY (EMERGENCY)
Facility: HOSPITAL | Age: 82
LOS: 0 days | Discharge: HOME | End: 2022-04-29
Attending: EMERGENCY MEDICINE | Admitting: EMERGENCY MEDICINE
Payer: MEDICARE

## 2022-04-28 VITALS
WEIGHT: 160.06 LBS | HEART RATE: 65 BPM | TEMPERATURE: 97 F | RESPIRATION RATE: 18 BRPM | OXYGEN SATURATION: 96 % | HEIGHT: 66 IN | SYSTOLIC BLOOD PRESSURE: 180 MMHG | DIASTOLIC BLOOD PRESSURE: 81 MMHG

## 2022-04-28 DIAGNOSIS — I10 ESSENTIAL (PRIMARY) HYPERTENSION: ICD-10-CM

## 2022-04-28 DIAGNOSIS — E11.40 TYPE 2 DIABETES MELLITUS WITH DIABETIC NEUROPATHY, UNSPECIFIED: ICD-10-CM

## 2022-04-28 DIAGNOSIS — Z79.84 LONG TERM (CURRENT) USE OF ORAL HYPOGLYCEMIC DRUGS: ICD-10-CM

## 2022-04-28 DIAGNOSIS — L29.9 PRURITUS, UNSPECIFIED: ICD-10-CM

## 2022-04-28 DIAGNOSIS — Z96.652 PRESENCE OF LEFT ARTIFICIAL KNEE JOINT: Chronic | ICD-10-CM

## 2022-04-28 DIAGNOSIS — R21 RASH AND OTHER NONSPECIFIC SKIN ERUPTION: ICD-10-CM

## 2022-04-28 DIAGNOSIS — B02.9 ZOSTER WITHOUT COMPLICATIONS: ICD-10-CM

## 2022-04-28 PROCEDURE — 99283 EMERGENCY DEPT VISIT LOW MDM: CPT

## 2022-04-28 NOTE — ED ADULT TRIAGE NOTE - CCCP TRG CHIEF CMPLNT
January 5, 2021     Patient: Shraddha Padron   YOB: 1949   Date of Visit: 1/5/2021       To Whom it May Concern:    Shraddha Padron is under my professional care  She was seen in my office on 1/5/2021  She may return to work on 01/06/2020  If you have any questions or concerns, please don't hesitate to call           Sincerely,          Krys Pineda MD        CC: No Recipients
rash

## 2022-04-29 RX ORDER — VALACYCLOVIR 500 MG/1
1 TABLET, FILM COATED ORAL
Qty: 21 | Refills: 0
Start: 2022-04-29 | End: 2022-05-05

## 2022-04-29 NOTE — ED PROVIDER NOTE - CLINICAL SUMMARY MEDICAL DECISION MAKING FREE TEXT BOX
80-year-old male history of hypertension diabetes, neuropathy presenting with left lower abdominal rash for the past 5 days.  Symptoms gradual onset, getting progressively worse since then.  Itching, minimal associated pain.  No fevers or chills.  No trauma. +shingles. Comfortable with discharge and follow-up outpatient, strict return precautions given. Endorses understanding of all of this and aware that they can return at any time for new or concerning symptoms. No further questions or concerns at this time

## 2022-04-29 NOTE — ED PROVIDER NOTE - NSCAREINITIATED _GEN_ER
Maryjane Ryan 61  OCCUPATIONAL THERAPY MISSED TREATMENT NOTE  STR MED SURG 8B  8B-22/022-A      Date: 10/25/2021  Patient Name: Elvira Baldwin        CSN: [id number]   : 1938  (81 y.o.)  Gender: female   Referring Practitioner: Dr. Carolyn Miller MD  Diagnosis: Hyponatremia         REASON FOR MISSED TREATMENT: Hold Treatment per Nursing  Pt is on BiPAP at this time and would not tolerate therapy. Will retry tomorrow. Li, Asmita(Attending)

## 2022-04-29 NOTE — ED PROVIDER NOTE - OBJECTIVE STATEMENT
80-year-old male history of hypertension diabetes, neuropathy presenting with left lower abdominal rash for the past 5 days.  Symptoms gradual onset, getting progressively worse since then.  Itching, minimal associated pain.  No fevers or chills.  No trauma.

## 2022-04-29 NOTE — ED PROVIDER NOTE - NS ED ROS FT
Constitutional:  See HPI.   Eyes:  No visual changes, eye pain or discharge.  ENMT:  No hearing changes, pain, discharge or infections. No neck pain or stiffness.  Cardiac:  No chest pain, SOB or edema. No chest pain with exertion.  Respiratory:  No cough or respiratory distress. No hemoptysis.  GI:  No nausea, vomiting, diarrhea, abdominal pain.  :  No dysuria, frequency, hematuria  MS:  No joint pain or back pain.  Neuro:  No LOC. No headache or weakness.       Except as in HPI, all other review of systems is negative

## 2022-04-29 NOTE — ED ADULT NURSE NOTE - NSIMPLEMENTINTERV_GEN_ALL_ED
Implemented All Universal Safety Interventions:  [place] to call system. Call bell, personal items and telephone within reach. Instruct patient to call for assistance. Room bathroom lighting operational. Non-slip footwear when patient is off stretcher. Physically safe environment: no spills, clutter or unnecessary equipment. Stretcher in lowest position, wheels locked, appropriate side rails in place.

## 2022-04-29 NOTE — ED PROVIDER NOTE - PHYSICAL EXAMINATION
Constitutional: Well appearing. No acute distress. Non toxic.   Eyes: PERRLA. Extraocular movements intact, no entrapment. Conjunctiva normal.         Pulm:   Normal work of breathing.  Abd: soft NT ND +BS.   Ext: Warm and well perfused x4, moving all extremities, no edema.   Psy: Cooperative, appropriate.   Skin: Warm, dry, Vesicular rash to left lower abdomen/groin.  Not crossing midline.  Neuro: CN2-12 grossly intact no sensory or motor deficits throughout, no drift, no ataxia

## 2022-04-29 NOTE — ED PROVIDER NOTE - PATIENT PORTAL LINK FT
You can access the FollowMyHealth Patient Portal offered by [facility] by registering at the following website: [email or web address]. By joining [employer]’s FollowMyHealth portal, you will also be able to view your health information using other applications (apps) compatible with our system.

## 2022-05-15 ENCOUNTER — EMERGENCY (EMERGENCY)
Facility: HOSPITAL | Age: 82
LOS: 0 days | Discharge: HOME | End: 2022-05-15
Attending: STUDENT IN AN ORGANIZED HEALTH CARE EDUCATION/TRAINING PROGRAM | Admitting: STUDENT IN AN ORGANIZED HEALTH CARE EDUCATION/TRAINING PROGRAM
Payer: MEDICARE

## 2022-05-15 VITALS
RESPIRATION RATE: 20 BRPM | HEART RATE: 62 BPM | DIASTOLIC BLOOD PRESSURE: 81 MMHG | OXYGEN SATURATION: 94 % | TEMPERATURE: 100 F | HEIGHT: 66 IN | WEIGHT: 160.06 LBS | SYSTOLIC BLOOD PRESSURE: 178 MMHG

## 2022-05-15 DIAGNOSIS — Z20.822 CONTACT WITH AND (SUSPECTED) EXPOSURE TO COVID-19: ICD-10-CM

## 2022-05-15 DIAGNOSIS — R05.9 COUGH, UNSPECIFIED: ICD-10-CM

## 2022-05-15 DIAGNOSIS — I10 ESSENTIAL (PRIMARY) HYPERTENSION: ICD-10-CM

## 2022-05-15 DIAGNOSIS — E11.9 TYPE 2 DIABETES MELLITUS WITHOUT COMPLICATIONS: ICD-10-CM

## 2022-05-15 DIAGNOSIS — R51.9 HEADACHE, UNSPECIFIED: ICD-10-CM

## 2022-05-15 DIAGNOSIS — E78.5 HYPERLIPIDEMIA, UNSPECIFIED: ICD-10-CM

## 2022-05-15 DIAGNOSIS — Z96.652 PRESENCE OF LEFT ARTIFICIAL KNEE JOINT: Chronic | ICD-10-CM

## 2022-05-15 DIAGNOSIS — Z79.84 LONG TERM (CURRENT) USE OF ORAL HYPOGLYCEMIC DRUGS: ICD-10-CM

## 2022-05-15 LAB
ALBUMIN SERPL ELPH-MCNC: 3.8 G/DL — SIGNIFICANT CHANGE UP (ref 3.5–5.2)
ALP SERPL-CCNC: 63 U/L — SIGNIFICANT CHANGE UP (ref 30–115)
ALT FLD-CCNC: 13 U/L — SIGNIFICANT CHANGE UP (ref 0–41)
ANION GAP SERPL CALC-SCNC: 11 MMOL/L — SIGNIFICANT CHANGE UP (ref 7–14)
AST SERPL-CCNC: 14 U/L — SIGNIFICANT CHANGE UP (ref 0–41)
BASOPHILS # BLD AUTO: 0.04 K/UL — SIGNIFICANT CHANGE UP (ref 0–0.2)
BASOPHILS NFR BLD AUTO: 0.5 % — SIGNIFICANT CHANGE UP (ref 0–1)
BILIRUB SERPL-MCNC: 0.3 MG/DL — SIGNIFICANT CHANGE UP (ref 0.2–1.2)
BUN SERPL-MCNC: 24 MG/DL — HIGH (ref 10–20)
CALCIUM SERPL-MCNC: 9 MG/DL — SIGNIFICANT CHANGE UP (ref 8.5–10.1)
CHLORIDE SERPL-SCNC: 107 MMOL/L — SIGNIFICANT CHANGE UP (ref 98–110)
CO2 SERPL-SCNC: 22 MMOL/L — SIGNIFICANT CHANGE UP (ref 17–32)
CREAT SERPL-MCNC: 1.1 MG/DL — SIGNIFICANT CHANGE UP (ref 0.7–1.5)
EGFR: 68 ML/MIN/1.73M2 — SIGNIFICANT CHANGE UP
EOSINOPHIL # BLD AUTO: 0.4 K/UL — SIGNIFICANT CHANGE UP (ref 0–0.7)
EOSINOPHIL NFR BLD AUTO: 5 % — SIGNIFICANT CHANGE UP (ref 0–8)
GLUCOSE SERPL-MCNC: 233 MG/DL — HIGH (ref 70–99)
HCT VFR BLD CALC: 36.5 % — LOW (ref 42–52)
HGB BLD-MCNC: 12.1 G/DL — LOW (ref 14–18)
IMM GRANULOCYTES NFR BLD AUTO: 0.2 % — SIGNIFICANT CHANGE UP (ref 0.1–0.3)
LYMPHOCYTES # BLD AUTO: 1.26 K/UL — SIGNIFICANT CHANGE UP (ref 1.2–3.4)
LYMPHOCYTES # BLD AUTO: 15.7 % — LOW (ref 20.5–51.1)
MCHC RBC-ENTMCNC: 28.4 PG — SIGNIFICANT CHANGE UP (ref 27–31)
MCHC RBC-ENTMCNC: 33.2 G/DL — SIGNIFICANT CHANGE UP (ref 32–37)
MCV RBC AUTO: 85.7 FL — SIGNIFICANT CHANGE UP (ref 80–94)
MONOCYTES # BLD AUTO: 0.95 K/UL — HIGH (ref 0.1–0.6)
MONOCYTES NFR BLD AUTO: 11.8 % — HIGH (ref 1.7–9.3)
NEUTROPHILS # BLD AUTO: 5.38 K/UL — SIGNIFICANT CHANGE UP (ref 1.4–6.5)
NEUTROPHILS NFR BLD AUTO: 66.8 % — SIGNIFICANT CHANGE UP (ref 42.2–75.2)
NRBC # BLD: 0 /100 WBCS — SIGNIFICANT CHANGE UP (ref 0–0)
PLATELET # BLD AUTO: 263 K/UL — SIGNIFICANT CHANGE UP (ref 130–400)
POTASSIUM SERPL-MCNC: 4.1 MMOL/L — SIGNIFICANT CHANGE UP (ref 3.5–5)
POTASSIUM SERPL-SCNC: 4.1 MMOL/L — SIGNIFICANT CHANGE UP (ref 3.5–5)
PROT SERPL-MCNC: 6.3 G/DL — SIGNIFICANT CHANGE UP (ref 6–8)
RAPID RVP RESULT: SIGNIFICANT CHANGE UP
RBC # BLD: 4.26 M/UL — LOW (ref 4.7–6.1)
RBC # FLD: 14.3 % — SIGNIFICANT CHANGE UP (ref 11.5–14.5)
SARS-COV-2 RNA SPEC QL NAA+PROBE: SIGNIFICANT CHANGE UP
SODIUM SERPL-SCNC: 140 MMOL/L — SIGNIFICANT CHANGE UP (ref 135–146)
WBC # BLD: 8.05 K/UL — SIGNIFICANT CHANGE UP (ref 4.8–10.8)
WBC # FLD AUTO: 8.05 K/UL — SIGNIFICANT CHANGE UP (ref 4.8–10.8)

## 2022-05-15 PROCEDURE — 99284 EMERGENCY DEPT VISIT MOD MDM: CPT

## 2022-05-15 PROCEDURE — 71046 X-RAY EXAM CHEST 2 VIEWS: CPT | Mod: 26

## 2022-05-15 RX ORDER — SODIUM CHLORIDE 9 MG/ML
1000 INJECTION, SOLUTION INTRAVENOUS ONCE
Refills: 0 | Status: COMPLETED | OUTPATIENT
Start: 2022-05-15 | End: 2022-05-15

## 2022-05-15 RX ADMIN — SODIUM CHLORIDE 1000 MILLILITER(S): 9 INJECTION, SOLUTION INTRAVENOUS at 02:00

## 2022-05-15 NOTE — ED PROVIDER NOTE - NSFOLLOWUPCLINICS_GEN_ALL_ED_FT
[facility]  Medicine  [address], [place]   Phone: [phone]  Fax:

## 2022-05-15 NOTE — ED PROVIDER NOTE - PHYSICAL EXAMINATION
CONSTITUTIONAL: Well-developed; well-nourished; in no acute distress.   SKIN: warm, dry. No rashes.  HEAD: Normocephalic; atraumatic.  EYES: PERRLA, EOMI, no conjunctival erythema.  ENT: No nasal discharge; airway clear. MMM.  NECK: Supple; non tender.  CARD: S1, S2 normal; no murmurs, gallops, or rubs. Regular rate and rhythm.   RESP: No wheezes, rales, or rhonchi. Lungs CTAB.  ABD: soft ntnd; no masses, rebound, or guarding.  EXT: Normal ROM. No clubbing, cyanosis, or edema.  NEURO: Alert, oriented, grossly unremarkable. No focal neuro. deficits.  PSYCH: Cooperative, appropriate.

## 2022-05-15 NOTE — ED PROVIDER NOTE - OBJECTIVE STATEMENT
82 yo male with pmh of htn, hld, dm presenting to the ED for 5 days of cough, mild headache, generalized body aches. Denies any sputum production w/ the cough, cough has been persistent. States that he has felt warm at home but has not checked his temperature. Pt is vaccinated for covid. Denies any nausea or vomiting, no abdominal pain, sob, cp, or palpitations. Has been tolerating PO at home.

## 2022-05-15 NOTE — ED ADULT NURSE NOTE - OBJECTIVE STATEMENT
Pt states that for the past 5 days he has been not feeling well. AOx4, Neg SOB, ambulatory with cane. Pt states that he has felt weak, had a cough, fever, and just general malaise. Pt states that he was taking mucinex am/pm but it did not help him, so he drove himself to ED.

## 2022-05-15 NOTE — ED PROVIDER NOTE - CLINICAL SUMMARY MEDICAL DECISION MAKING FREE TEXT BOX
I personally evaluated the patient. I reviewed the Resident´s or Physician Assistant´s note (as assigned above), and agree with the findings and plan except as documented in my note.  Patient evaluated for cough.  Labs, chest x-ray performed in the ED.  Patient given fluid resuscitation and medication for cough.  Patient well-appearing, oxygen saturations stable.  Given return precautions.  I have fully discussed the medical management and delivery of care with the patient. I have discussed any available labs, imaging and treatment options with the patient. Patient confirms understanding and has been given detailed return precautions. Patient instructed to return to the ED should symptoms persist or worsen. Patient has demonstrated capacity and has verbalized understanding. Patient is well appearing upon discharge.

## 2022-05-15 NOTE — ED PROVIDER NOTE - PATIENT PORTAL LINK FT
You can access the FollowMyHealth Patient Portal offered by [facility] by registering at the following website: [email or web address]. By joining [employer]’s FollowMyHealth portal, you will also be able to view your health information using other applications (apps) compatible with our system.

## 2022-05-15 NOTE — ED PROVIDER NOTE - DISCHARGE DATE
15-May-2022 Eucrisa Pregnancy And Lactation Text: This medication has not been assigned a Pregnancy Risk Category but animal studies failed to show danger with the topical medication. It is unknown if the medication is excreted in breast milk.

## 2022-05-15 NOTE — ED PROVIDER NOTE - ATTENDING CONTRIBUTION TO CARE
81-year-old male past medical history of hypertension, hyperlipidemia, diabetes presents with cough.  Cough for the past 5 days associated with mild headache and generalized body aches.  No nausea/vomiting/diarrhea, no abdominal pain, no shortness of breath, no chest pain, no palpitations, no neck pain/stiffness, no extremity pain/swelling, no back pain, no flank pain, no urinary symptoms.    CONSTITUTIONAL: Well-developed; well-nourished; in no acute distress. Sitting up and providing appropriate history and physical examination  SKIN: skin exam is warm and dry, no acute rash.  HEAD: Normocephalic; atraumatic.  EYES: PERRL, 3 mm bilateral, no nystagmus, EOM intact; conjunctiva and sclera clear.  ENT: No nasal discharge; airway clear.  NECK: Supple; non tender. + full passive ROM in all directions. No JVD  CARD: S1, S2 normal; no murmurs, gallops, or rubs. Regular rate and rhythm. + Symmetric Strong Pulses  RESP: No wheezes, rales or rhonchi. Good air movement bilaterally  ABD: soft; non-distended; non-tender. No Rebound, No Guarding, No signs of peritonitis, No CVA tenderness. No pulsatile abdominal mass. + Strong and Symmetric Pulses  EXT: Normal ROM. No clubbing, cyanosis or edema. Dp and Pt Pulses intact. Cap refill less than 3 seconds  NEURO: CN 2-12 intact, normal finger to nose, normal romberg, stable gait, no sensory or motor deficits, Alert, oriented, grossly unremarkable. No Focal deficits. GCS 15. NIH 0  PSYCH: Cooperative, appropriate.

## 2022-08-14 ENCOUNTER — EMERGENCY (EMERGENCY)
Facility: HOSPITAL | Age: 82
LOS: 0 days | Discharge: HOME | End: 2022-08-14
Attending: STUDENT IN AN ORGANIZED HEALTH CARE EDUCATION/TRAINING PROGRAM

## 2022-08-14 VITALS
TEMPERATURE: 97 F | HEIGHT: 66 IN | SYSTOLIC BLOOD PRESSURE: 189 MMHG | RESPIRATION RATE: 18 BRPM | HEART RATE: 50 BPM | DIASTOLIC BLOOD PRESSURE: 81 MMHG | OXYGEN SATURATION: 100 %

## 2022-08-14 DIAGNOSIS — S05.01XA INJURY OF CONJUNCTIVA AND CORNEAL ABRASION WITHOUT FOREIGN BODY, RIGHT EYE, INITIAL ENCOUNTER: ICD-10-CM

## 2022-08-14 DIAGNOSIS — H26.9 UNSPECIFIED CATARACT: ICD-10-CM

## 2022-08-14 DIAGNOSIS — I10 ESSENTIAL (PRIMARY) HYPERTENSION: ICD-10-CM

## 2022-08-14 DIAGNOSIS — H57.11 OCULAR PAIN, RIGHT EYE: ICD-10-CM

## 2022-08-14 DIAGNOSIS — Z96.652 PRESENCE OF LEFT ARTIFICIAL KNEE JOINT: ICD-10-CM

## 2022-08-14 DIAGNOSIS — E11.9 TYPE 2 DIABETES MELLITUS WITHOUT COMPLICATIONS: ICD-10-CM
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
## 2022-08-14 DIAGNOSIS — Z79.84 LONG TERM (CURRENT) USE OF ORAL HYPOGLYCEMIC DRUGS: ICD-10-CM

## 2022-08-14 DIAGNOSIS — Z96.652 PRESENCE OF LEFT ARTIFICIAL KNEE JOINT: Chronic | ICD-10-CM
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
## 2022-08-14 PROCEDURE — 99283 EMERGENCY DEPT VISIT LOW MDM: CPT

## 2022-08-14 RX ORDER — POLYMYXIN B SULF/TRIMETHOPRIM 10000-1/ML
1 DROPS OPHTHALMIC (EYE)
Qty: 1 | Refills: 0
Start: 2022-08-14 | End: 2022-08-23

## 2022-08-14 NOTE — ED PROVIDER NOTE - PATIENT PORTAL LINK FT
You can access the FollowMyHealth Patient Portal offered by [facility] by registering at the following website: [email or web address]. By joining [email or web address]’s FollowMyHealth portal, you will also be able to view your health information using other applications (apps) compatible with our system.

## 2022-08-14 NOTE — ED ADULT TRIAGE NOTE - CHIEF COMPLAINT QUOTE
pt states he was taking plants out of his car when one of the sticks poked him in the right eye - no redness noted c/o tearing and pain - denies blurry vision

## 2022-08-14 NOTE — ED PROVIDER NOTE - PHYSICAL EXAMINATION
CONSTITUTIONAL: Well-developed; well-nourished; in no acute distress.   SKIN: warm, dry. no rashes.  HEAD: Normocephalic; atraumatic.  EYES: PERRLA, EOMI, (+) conjunctival injection to R eye. (+) R eye- small corneal abrasion seen on fluorescein eye exam. Visual acuity 20/30 to b/l eyes.   ENT: No nasal discharge; airway clear. mmm.  EXT: Normal ROM.  No clubbing, cyanosis, or edema.  NEURO: Alert, oriented, grossly unremarkable. no focal neuro. deficits. ambulating with a steady gait.  PSYCH: Cooperative, appropriate.

## 2022-08-14 NOTE — ED PROVIDER NOTE - CLINICAL SUMMARY MEDICAL DECISION MAKING FREE TEXT BOX
82-year-old male presenting with right traumatic eye pain, fluorescein stain showing right corneal abrasion.  Patient sent Polytrim drops to pharmacy.  Ophthalmology outpatient follow-up encouraged. Patient to be discharged from ED. Any available test results were discussed with patient and/or family. Verbal instructions given, including instructions to return to ED immediately for any new, worsening, or concerning symptoms. Patient endorsed understanding. Written discharge instructions additionally given, including follow-up plan.

## 2022-08-14 NOTE — ED PROVIDER NOTE - NSFOLLOWUPCLINICS_GEN_ALL_ED_FT
[facility]  Ophthalmolgy  [address], [address]  [place] [address]  Phone: [phone]  Fax:   Follow Up Time: 4-6 Days

## 2022-08-14 NOTE — ED PROVIDER NOTE - OBJECTIVE STATEMENT
82 year old male with past medical history of htn, dm, cataracts, UTD on tetanus shot presenting to the ED for R eye pain. Patient states that he was gardening earlier today and accidentally poked his R eye with a tree branch. Endorses mild irritation to the R eye but otherwise denies blurry vision.

## 2022-08-14 NOTE — ED PROVIDER NOTE - CARE PLAN
Assessment and plan of treatment:	plan - fluorescin   1 Principal Discharge DX:	Corneal abrasion, right  Assessment and plan of treatment:	plan - jonathon

## 2022-09-18 ENCOUNTER — EMERGENCY (EMERGENCY)
Facility: HOSPITAL | Age: 82
LOS: 0 days | Discharge: LEFT BEFORE DISCHARGE | End: 2022-09-19
Attending: STUDENT IN AN ORGANIZED HEALTH CARE EDUCATION/TRAINING PROGRAM | Admitting: STUDENT IN AN ORGANIZED HEALTH CARE EDUCATION/TRAINING PROGRAM

## 2022-09-18 VITALS
WEIGHT: 160.06 LBS | OXYGEN SATURATION: 98 % | SYSTOLIC BLOOD PRESSURE: 142 MMHG | HEART RATE: 60 BPM | HEIGHT: 66 IN | RESPIRATION RATE: 18 BRPM | DIASTOLIC BLOOD PRESSURE: 66 MMHG | TEMPERATURE: 98 F

## 2022-09-18 DIAGNOSIS — Z96.652 PRESENCE OF LEFT ARTIFICIAL KNEE JOINT: Chronic | ICD-10-CM

## 2022-09-18 DIAGNOSIS — F17.210 NICOTINE DEPENDENCE, CIGARETTES, UNCOMPLICATED: ICD-10-CM

## 2022-09-18 DIAGNOSIS — I10 ESSENTIAL (PRIMARY) HYPERTENSION: ICD-10-CM

## 2022-09-18 DIAGNOSIS — Z96.642 PRESENCE OF LEFT ARTIFICIAL HIP JOINT: ICD-10-CM

## 2022-09-18 DIAGNOSIS — R10.13 EPIGASTRIC PAIN: ICD-10-CM

## 2022-09-18 DIAGNOSIS — E78.5 HYPERLIPIDEMIA, UNSPECIFIED: ICD-10-CM

## 2022-09-18 DIAGNOSIS — Z79.84 LONG TERM (CURRENT) USE OF ORAL HYPOGLYCEMIC DRUGS: ICD-10-CM

## 2022-09-18 DIAGNOSIS — E11.9 TYPE 2 DIABETES MELLITUS WITHOUT COMPLICATIONS: ICD-10-CM

## 2022-09-18 LAB
ALBUMIN SERPL ELPH-MCNC: 4.2 G/DL — SIGNIFICANT CHANGE UP (ref 3.5–5.2)
ALP SERPL-CCNC: 59 U/L — SIGNIFICANT CHANGE UP (ref 30–115)
ALT FLD-CCNC: 19 U/L — SIGNIFICANT CHANGE UP (ref 0–41)
ANION GAP SERPL CALC-SCNC: 12 MMOL/L — SIGNIFICANT CHANGE UP (ref 7–14)
AST SERPL-CCNC: 24 U/L — SIGNIFICANT CHANGE UP (ref 0–41)
BASOPHILS # BLD AUTO: 0.04 K/UL — SIGNIFICANT CHANGE UP (ref 0–0.2)
BASOPHILS NFR BLD AUTO: 0.6 % — SIGNIFICANT CHANGE UP (ref 0–1)
BILIRUB SERPL-MCNC: 0.3 MG/DL — SIGNIFICANT CHANGE UP (ref 0.2–1.2)
BUN SERPL-MCNC: 27 MG/DL — HIGH (ref 10–20)
CALCIUM SERPL-MCNC: 9.4 MG/DL — SIGNIFICANT CHANGE UP (ref 8.4–10.4)
CHLORIDE SERPL-SCNC: 107 MMOL/L — SIGNIFICANT CHANGE UP (ref 98–110)
CO2 SERPL-SCNC: 24 MMOL/L — SIGNIFICANT CHANGE UP (ref 17–32)
CREAT SERPL-MCNC: 1.3 MG/DL — SIGNIFICANT CHANGE UP (ref 0.7–1.5)
EGFR: 55 ML/MIN/1.73M2 — LOW
EOSINOPHIL # BLD AUTO: 0.23 K/UL — SIGNIFICANT CHANGE UP (ref 0–0.7)
EOSINOPHIL NFR BLD AUTO: 3.3 % — SIGNIFICANT CHANGE UP (ref 0–8)
GLUCOSE SERPL-MCNC: 123 MG/DL — HIGH (ref 70–99)
HCT VFR BLD CALC: 38.9 % — LOW (ref 42–52)
HGB BLD-MCNC: 13.1 G/DL — LOW (ref 14–18)
IMM GRANULOCYTES NFR BLD AUTO: 0.3 % — SIGNIFICANT CHANGE UP (ref 0.1–0.3)
LIDOCAIN IGE QN: 59 U/L — SIGNIFICANT CHANGE UP (ref 7–60)
LYMPHOCYTES # BLD AUTO: 1.72 K/UL — SIGNIFICANT CHANGE UP (ref 1.2–3.4)
LYMPHOCYTES # BLD AUTO: 24.6 % — SIGNIFICANT CHANGE UP (ref 20.5–51.1)
MCHC RBC-ENTMCNC: 28 PG — SIGNIFICANT CHANGE UP (ref 27–31)
MCHC RBC-ENTMCNC: 33.7 G/DL — SIGNIFICANT CHANGE UP (ref 32–37)
MCV RBC AUTO: 83.1 FL — SIGNIFICANT CHANGE UP (ref 80–94)
MONOCYTES # BLD AUTO: 0.71 K/UL — HIGH (ref 0.1–0.6)
MONOCYTES NFR BLD AUTO: 10.2 % — HIGH (ref 1.7–9.3)
NEUTROPHILS # BLD AUTO: 4.26 K/UL — SIGNIFICANT CHANGE UP (ref 1.4–6.5)
NEUTROPHILS NFR BLD AUTO: 61 % — SIGNIFICANT CHANGE UP (ref 42.2–75.2)
NRBC # BLD: 0 /100 WBCS — SIGNIFICANT CHANGE UP (ref 0–0)
NT-PROBNP SERPL-SCNC: 86 PG/ML — SIGNIFICANT CHANGE UP (ref 0–300)
PLATELET # BLD AUTO: 203 K/UL — SIGNIFICANT CHANGE UP (ref 130–400)
POTASSIUM SERPL-MCNC: 4.1 MMOL/L — SIGNIFICANT CHANGE UP (ref 3.5–5)
POTASSIUM SERPL-SCNC: 4.1 MMOL/L — SIGNIFICANT CHANGE UP (ref 3.5–5)
PROT SERPL-MCNC: 6.6 G/DL — SIGNIFICANT CHANGE UP (ref 6–8)
RBC # BLD: 4.68 M/UL — LOW (ref 4.7–6.1)
RBC # FLD: 13.8 % — SIGNIFICANT CHANGE UP (ref 11.5–14.5)
SODIUM SERPL-SCNC: 143 MMOL/L — SIGNIFICANT CHANGE UP (ref 135–146)
TROPONIN T SERPL-MCNC: <0.01 NG/ML — SIGNIFICANT CHANGE UP
WBC # BLD: 6.98 K/UL — SIGNIFICANT CHANGE UP (ref 4.8–10.8)
WBC # FLD AUTO: 6.98 K/UL — SIGNIFICANT CHANGE UP (ref 4.8–10.8)

## 2022-09-18 PROCEDURE — 71045 X-RAY EXAM CHEST 1 VIEW: CPT | Mod: 26

## 2022-09-18 PROCEDURE — 93010 ELECTROCARDIOGRAM REPORT: CPT

## 2022-09-18 PROCEDURE — 99285 EMERGENCY DEPT VISIT HI MDM: CPT

## 2022-09-18 PROCEDURE — 93010 ELECTROCARDIOGRAM REPORT: CPT | Mod: 77

## 2022-09-18 RX ORDER — FAMOTIDINE 10 MG/ML
20 INJECTION INTRAVENOUS ONCE
Refills: 0 | Status: COMPLETED | OUTPATIENT
Start: 2022-09-18 | End: 2022-09-18

## 2022-09-18 RX ORDER — FAMOTIDINE 10 MG/ML
1 INJECTION INTRAVENOUS
Qty: 14 | Refills: 0
Start: 2022-09-18 | End: 2022-09-24

## 2022-09-18 RX ORDER — SODIUM CHLORIDE 9 MG/ML
1000 INJECTION INTRAMUSCULAR; INTRAVENOUS; SUBCUTANEOUS ONCE
Refills: 0 | Status: COMPLETED | OUTPATIENT
Start: 2022-09-18 | End: 2022-09-18

## 2022-09-18 RX ADMIN — Medication 30 MILLILITER(S): at 20:33

## 2022-09-18 RX ADMIN — SODIUM CHLORIDE 1000 MILLILITER(S): 9 INJECTION INTRAMUSCULAR; INTRAVENOUS; SUBCUTANEOUS at 20:34

## 2022-09-18 RX ADMIN — FAMOTIDINE 104 MILLIGRAM(S): 10 INJECTION INTRAVENOUS at 20:33

## 2022-09-18 NOTE — ED PROVIDER NOTE - PATIENT PORTAL LINK FT
You can access the FollowMyHealth Patient Portal offered by [facility] by registering at the following website: [email or web address]. By joining [employer]’s FollowMyHealth portal, you will also be able to view your health information using other applications (apps) compatible with our system.

## 2022-09-18 NOTE — ED PROVIDER NOTE - NS ED ATTENDING STATEMENT MOD
This was a shared visit with the LESVIA. I reviewed and verified the documentation and independently performed the documented:

## 2022-09-18 NOTE — ED PROVIDER NOTE - CARE PLAN
Principal Discharge DX:	Epigastric pain   1 Principal Discharge DX:	Epigastric pain  Assessment and plan of treatment:	r/o ACS, vs gastritis  labs, imaging, reassess.

## 2022-09-18 NOTE — ED PROVIDER NOTE - CLINICAL SUMMARY MEDICAL DECISION MAKING FREE TEXT BOX
81 yo M w/ hx as documented p/w epigastric abdominal pain. ekg no stemi. labs and imaging reviewed. symptoms resolved in ED however recommended EDOU for ACS work up however patient decided to leave AMA- preferred outpatient workup. return precautions discussed in detail with patient, instructed to return to ED immediately should symptoms return, worsen, or he change his mind.

## 2022-09-18 NOTE — ED PROVIDER NOTE - CARE PROVIDER_API CALL
Umberto Diaz (MD)  Cardiovascular Disease; Interventional Cardiology  [address]  [place] [address]  Phone: [phone]  Fax: [phone]  Follow Up Time: 1-3 Days

## 2022-09-18 NOTE — ED PROVIDER NOTE - ATTENDING APP SHARED VISIT CONTRIBUTION OF CARE
81 yo M w/ hx of HTN, HLD, DM p/w epigastric abdominal burning pain radiating up the neck, monexertional, occurring at random, occasionally worse after food. denies exertional sob or chest pain. states that is scheduled for a stress test for pre-op clearance for eye surgery.     CONSTITUTIONAL: Well-developed; well-nourished; in no acute distress.   SKIN: warm, dry  HEAD: Normocephalic; atraumatic.  EYES: PERRL, EOMI, no conjunctival erythema  ENT: No nasal discharge; airway clear.  NECK: Supple; non tender.  CARD: S1, S2 normal; no murmurs, gallops, or rubs. Regular rate and rhythm.   RESP: No wheezes, rales or rhonchi.  ABD: soft ntnd  EXT: Normal ROM.  No clubbing, cyanosis or edema.   NEURO: Alert, oriented, grossly unremarkable  PSYCH: Cooperative, appropriate.    ekg: no stemi

## 2022-09-18 NOTE — ED PROVIDER NOTE - OBJECTIVE STATEMENT
83 yo male with a pmh of HTN, HLD, and DM presents c/o epigastric pain for 2 days. pt describes the pain as burning in nature with noted aggravation with eating/drinking. pt denies any chest pain. pt denies any other symptoms including fevers, chill, headache, recent illness/travel, cough, abdominal pain, or SOB.

## 2022-09-18 NOTE — ED ADULT NURSE NOTE - OBJECTIVE STATEMENT
Pt alert and oriented x3. Airway patent with equal respirations. No distress noted. Arom x 4 extremitites. Denies any chest pain, palpitations, dizziness, nausea, vomiting, visual disturbances. Denies HI,SI. Calm and collective.  c.o abdominal pain, states it began yeatrday and feels like heart burn.

## 2022-10-03 ENCOUNTER — APPOINTMENT (OUTPATIENT)
Dept: UROLOGY | Facility: CLINIC | Age: 82
End: 2022-10-03

## 2022-10-03 ENCOUNTER — LABORATORY RESULT (OUTPATIENT)
Age: 82
End: 2022-10-03

## 2022-10-03 VITALS
RESPIRATION RATE: 16 BRPM | HEIGHT: 66 IN | HEART RATE: 47 BPM | TEMPERATURE: 98 F | BODY MASS INDEX: 25.71 KG/M2 | WEIGHT: 160 LBS | DIASTOLIC BLOOD PRESSURE: 65 MMHG | SYSTOLIC BLOOD PRESSURE: 160 MMHG

## 2022-10-03 DIAGNOSIS — R30.0 DYSURIA: ICD-10-CM

## 2022-10-03 PROCEDURE — 99214 OFFICE O/P EST MOD 30 MIN: CPT

## 2022-10-03 NOTE — ASSESSMENT
[FreeTextEntry1] : ROBERT REYES is a 82 year old male hx DM who presents with history of urinary tract infection, BPH/LUTS, glucosuria now LUTS resolved on flomax and LUTS worsening after pt self dced flomax.\par \par Plan \par -u/a ,ucx and urine cytology \par - restart Flomax \par - f/u in 6 weeks for uroflow/pvr \par \par \par Re: alpha blocker-- The patient understands that this medication may cause dizziness, retrograde ejaculation or nasal congestion among other complications. I recommended that the patient take this medication at night. If the side effects become too bothersome, the medication can be discontinued. He does not have any future plans for eye surgery.\par

## 2022-10-03 NOTE — HISTORY OF PRESENT ILLNESS
[FreeTextEntry1] : ROBERT REYES is a 82 year old male hx DM who presents with history of urinary tract infection, BPH/LUTS, glucosuria now LUTS resolved on Flomax.\par \par Pt has d/c Flomax and started having worsening sx . He c/o intermittent dysuria , and intermittent incomplete urination , weak stream . Denies gross hematuria \par \par Previously:\par Bladder sono images visualized the ureter 2020 demonstrating 65 g prostate with post with residual 3 mL. The lateral thickening.\par \par Previously Nov 2019 patient began to experience worsening lower urinary tract symptoms and dysuria as an emergency room where he was found to have urinary tract infection.\par He was prescribed antibiotics and reports that his symptoms resolved. He is to started Flomax and reports significant improvement in his lower urinary tract symptoms.\par Denies gross hematuria, dysuria or associated symptoms. Denies pelvic pain.\par Udip 2/3/20 shows glucosuria without microheme or pyurai\par \par Denies  PMH including previous kidney stones, recurrent UTIs. \par Family History: No  malignancies\par Soc hx retired\par \par Old records reviewed\par PSA 3.11 9/2019\par UCx klebsiella prescott sensitive >100,000 cfu 11/2019\par UA neg for 3 or greater RBCs

## 2022-10-03 NOTE — ADDENDUM
[FreeTextEntry1] : Patient's note was transcribed with the assistance of a medical scribe under the supervision of Dr. Hankins.\par I, Dr. Hankins, have reviewed the patient's chart and agree that it aligns with my medical decisions.\par April Valverde, our scribe, also served as a chaperone for physical examination purposes.\par \par \par

## 2022-10-06 ENCOUNTER — NON-APPOINTMENT (OUTPATIENT)
Age: 82
End: 2022-10-06

## 2022-10-06 LAB — BACTERIA UR CULT: NORMAL

## 2022-12-07 NOTE — ED ADULT NURSE NOTE - CAS TRG GEN SKIN COLOR
Normal for race/Pink
Normal vision: sees adequately in most situations; can see medication labels, newsprint

## 2022-12-12 ENCOUNTER — APPOINTMENT (OUTPATIENT)
Dept: UROLOGY | Facility: CLINIC | Age: 82
End: 2022-12-12

## 2022-12-12 VITALS
RESPIRATION RATE: 18 BRPM | OXYGEN SATURATION: 98 % | DIASTOLIC BLOOD PRESSURE: 56 MMHG | HEART RATE: 52 BPM | TEMPERATURE: 97.3 F | HEIGHT: 66 IN | BODY MASS INDEX: 25.71 KG/M2 | SYSTOLIC BLOOD PRESSURE: 155 MMHG | WEIGHT: 160 LBS

## 2022-12-12 PROCEDURE — 99214 OFFICE O/P EST MOD 30 MIN: CPT

## 2022-12-12 NOTE — ASSESSMENT
[FreeTextEntry1] : ROBERT REYES is a 82 year old male hx DM who presents with history of urinary tract infection, BPH/LUTS, glucosuria now LUTS resolved on flomax and LUTS worsening after pt self dced flomax. Patient has restarted Flomax and reports improvement in LUTS. \par \par Uroflow study today is not a adequate study given under distention of bladder.  Patient voided approximately 120 mL and had a PVR of 13 mL.  Pattern was Valsalva which may be related to underdistention.\par Patient reports he is feeling well.\par \par Plan\par -Follow-up 1 year\par -Continue tamsulosin 0.4 mg daily reiterated side effect of patient having any at this time

## 2022-12-12 NOTE — HISTORY OF PRESENT ILLNESS
[FreeTextEntry1] : Robert Reyes is an 82 year old male hx DM who presents with history of UTI, BPH/LUTS, glucosuria. \par LUTS are managed and maintained on Flomax. Patient did self discontinue Flomax and felt worsening of LUTS. \par \par Uroflow patient is visualized and PVR done today shows Valsalva voiding with intermittency.  Low flow.  Patient voided volume approximately 120 mL with a PVR of 13 mL.\par \par Patient denies any dysuria and gross hematuria.\par Reports significant improved symptoms with FLomax\par \par Urine testing from October 2022\par Culture no growth\par Microscopic add-on 0 RBCs and 0 WBCs\par UA negative for blood, nitrites, and leukocytes.\par \par Previously:\par Bladder sono images 2020 demonstrating 65 g prostate with post with residual 3 mL. The lateral thickening.\par \par Nov 2019 patient began to experience worsening lower urinary tract symptoms and dysuria as an emergency room where he was found to have urinary tract infection.\par He was prescribed antibiotics and reports that his symptoms resolved. He is to started Flomax and reports significant improvement in his lower urinary tract symptoms.\par Denies gross hematuria, dysuria or associated symptoms. Denies pelvic pain.\par Udip 2/3/20 shows glucosuria without microheme or pyurai\par \par Denies  PMH including previous kidney stones, recurrent UTIs. \par Family History: No  malignancies\par Soc hx retired\par \par Old records reviewed\par PSA 3.11 9/2019\par UCx klebsiella prescott sensitive >100,000 cfu 11/2019\par UA neg for 3 or greater RBCs \par

## 2022-12-31 ENCOUNTER — EMERGENCY (EMERGENCY)
Facility: HOSPITAL | Age: 82
LOS: 0 days | Discharge: HOME | End: 2022-12-31
Attending: EMERGENCY MEDICINE | Admitting: EMERGENCY MEDICINE
Payer: MEDICARE

## 2022-12-31 VITALS
RESPIRATION RATE: 16 BRPM | DIASTOLIC BLOOD PRESSURE: 72 MMHG | WEIGHT: 160.06 LBS | SYSTOLIC BLOOD PRESSURE: 152 MMHG | HEART RATE: 76 BPM | TEMPERATURE: 101 F | OXYGEN SATURATION: 98 %

## 2022-12-31 DIAGNOSIS — R05.1 ACUTE COUGH: ICD-10-CM

## 2022-12-31 DIAGNOSIS — E78.5 HYPERLIPIDEMIA, UNSPECIFIED: ICD-10-CM

## 2022-12-31 DIAGNOSIS — R50.9 FEVER, UNSPECIFIED: ICD-10-CM

## 2022-12-31 DIAGNOSIS — R51.9 HEADACHE, UNSPECIFIED: ICD-10-CM

## 2022-12-31 DIAGNOSIS — E11.9 TYPE 2 DIABETES MELLITUS WITHOUT COMPLICATIONS: ICD-10-CM

## 2022-12-31 DIAGNOSIS — R09.89 OTHER SPECIFIED SYMPTOMS AND SIGNS INVOLVING THE CIRCULATORY AND RESPIRATORY SYSTEMS: ICD-10-CM

## 2022-12-31 DIAGNOSIS — Z79.84 LONG TERM (CURRENT) USE OF ORAL HYPOGLYCEMIC DRUGS: ICD-10-CM

## 2022-12-31 DIAGNOSIS — H66.91 OTITIS MEDIA, UNSPECIFIED, RIGHT EAR: ICD-10-CM

## 2022-12-31 DIAGNOSIS — R63.0 ANOREXIA: ICD-10-CM

## 2022-12-31 DIAGNOSIS — Z20.822 CONTACT WITH AND (SUSPECTED) EXPOSURE TO COVID-19: ICD-10-CM

## 2022-12-31 DIAGNOSIS — M79.10 MYALGIA, UNSPECIFIED SITE: ICD-10-CM

## 2022-12-31 DIAGNOSIS — Z96.652 PRESENCE OF LEFT ARTIFICIAL KNEE JOINT: Chronic | ICD-10-CM

## 2022-12-31 DIAGNOSIS — I10 ESSENTIAL (PRIMARY) HYPERTENSION: ICD-10-CM

## 2022-12-31 DIAGNOSIS — R05.8 OTHER SPECIFIED COUGH: ICD-10-CM

## 2022-12-31 LAB
ALBUMIN SERPL ELPH-MCNC: 4 G/DL — SIGNIFICANT CHANGE UP (ref 3.5–5.2)
ALP SERPL-CCNC: 51 U/L — SIGNIFICANT CHANGE UP (ref 30–115)
ALT FLD-CCNC: 15 U/L — SIGNIFICANT CHANGE UP (ref 0–41)
ANION GAP SERPL CALC-SCNC: 12 MMOL/L — SIGNIFICANT CHANGE UP (ref 7–14)
AST SERPL-CCNC: 17 U/L — SIGNIFICANT CHANGE UP (ref 0–41)
BASOPHILS # BLD AUTO: 0.04 K/UL — SIGNIFICANT CHANGE UP (ref 0–0.2)
BASOPHILS NFR BLD AUTO: 0.7 % — SIGNIFICANT CHANGE UP (ref 0–1)
BILIRUB SERPL-MCNC: 0.4 MG/DL — SIGNIFICANT CHANGE UP (ref 0.2–1.2)
BUN SERPL-MCNC: 23 MG/DL — HIGH (ref 10–20)
CALCIUM SERPL-MCNC: 9.1 MG/DL — SIGNIFICANT CHANGE UP (ref 8.4–10.5)
CHLORIDE SERPL-SCNC: 105 MMOL/L — SIGNIFICANT CHANGE UP (ref 98–110)
CO2 SERPL-SCNC: 22 MMOL/L — SIGNIFICANT CHANGE UP (ref 17–32)
CREAT SERPL-MCNC: 1.3 MG/DL — SIGNIFICANT CHANGE UP (ref 0.7–1.5)
EGFR: 55 ML/MIN/1.73M2 — LOW
EOSINOPHIL # BLD AUTO: 0.04 K/UL — SIGNIFICANT CHANGE UP (ref 0–0.7)
EOSINOPHIL NFR BLD AUTO: 0.7 % — SIGNIFICANT CHANGE UP (ref 0–8)
FLUAV AG NPH QL: SIGNIFICANT CHANGE UP
FLUBV AG NPH QL: SIGNIFICANT CHANGE UP
GLUCOSE SERPL-MCNC: 156 MG/DL — HIGH (ref 70–99)
HCT VFR BLD CALC: 35.6 % — LOW (ref 42–52)
HGB BLD-MCNC: 12 G/DL — LOW (ref 14–18)
IMM GRANULOCYTES NFR BLD AUTO: 0.3 % — SIGNIFICANT CHANGE UP (ref 0.1–0.3)
LYMPHOCYTES # BLD AUTO: 0.67 K/UL — LOW (ref 1.2–3.4)
LYMPHOCYTES # BLD AUTO: 11.3 % — LOW (ref 20.5–51.1)
MCHC RBC-ENTMCNC: 28.8 PG — SIGNIFICANT CHANGE UP (ref 27–31)
MCHC RBC-ENTMCNC: 33.7 G/DL — SIGNIFICANT CHANGE UP (ref 32–37)
MCV RBC AUTO: 85.4 FL — SIGNIFICANT CHANGE UP (ref 80–94)
MONOCYTES # BLD AUTO: 1.06 K/UL — HIGH (ref 0.1–0.6)
MONOCYTES NFR BLD AUTO: 17.9 % — HIGH (ref 1.7–9.3)
NEUTROPHILS # BLD AUTO: 4.09 K/UL — SIGNIFICANT CHANGE UP (ref 1.4–6.5)
NEUTROPHILS NFR BLD AUTO: 69.1 % — SIGNIFICANT CHANGE UP (ref 42.2–75.2)
NRBC # BLD: 0 /100 WBCS — SIGNIFICANT CHANGE UP (ref 0–0)
PLATELET # BLD AUTO: 175 K/UL — SIGNIFICANT CHANGE UP (ref 130–400)
POTASSIUM SERPL-MCNC: 4 MMOL/L — SIGNIFICANT CHANGE UP (ref 3.5–5)
POTASSIUM SERPL-SCNC: 4 MMOL/L — SIGNIFICANT CHANGE UP (ref 3.5–5)
PROT SERPL-MCNC: 6.4 G/DL — SIGNIFICANT CHANGE UP (ref 6–8)
RBC # BLD: 4.17 M/UL — LOW (ref 4.7–6.1)
RBC # FLD: 14.6 % — HIGH (ref 11.5–14.5)
RSV RNA NPH QL NAA+NON-PROBE: SIGNIFICANT CHANGE UP
SARS-COV-2 RNA SPEC QL NAA+PROBE: SIGNIFICANT CHANGE UP
SODIUM SERPL-SCNC: 139 MMOL/L — SIGNIFICANT CHANGE UP (ref 135–146)
WBC # BLD: 5.92 K/UL — SIGNIFICANT CHANGE UP (ref 4.8–10.8)
WBC # FLD AUTO: 5.92 K/UL — SIGNIFICANT CHANGE UP (ref 4.8–10.8)

## 2022-12-31 PROCEDURE — 99284 EMERGENCY DEPT VISIT MOD MDM: CPT

## 2022-12-31 PROCEDURE — 71045 X-RAY EXAM CHEST 1 VIEW: CPT | Mod: 26

## 2022-12-31 RX ORDER — SODIUM CHLORIDE 9 MG/ML
1000 INJECTION INTRAMUSCULAR; INTRAVENOUS; SUBCUTANEOUS ONCE
Refills: 0 | Status: COMPLETED | OUTPATIENT
Start: 2022-12-31 | End: 2022-12-31

## 2022-12-31 RX ORDER — KETOROLAC TROMETHAMINE 30 MG/ML
15 SYRINGE (ML) INJECTION ONCE
Refills: 0 | Status: DISCONTINUED | OUTPATIENT
Start: 2022-12-31 | End: 2022-12-31

## 2022-12-31 RX ORDER — AZITHROMYCIN 500 MG/1
1 TABLET, FILM COATED ORAL
Qty: 6 | Refills: 0
Start: 2022-12-31 | End: 2023-01-04

## 2022-12-31 RX ORDER — ACETAMINOPHEN 500 MG
650 TABLET ORAL ONCE
Refills: 0 | Status: COMPLETED | OUTPATIENT
Start: 2022-12-31 | End: 2022-12-31

## 2022-12-31 RX ADMIN — Medication 650 MILLIGRAM(S): at 11:03

## 2022-12-31 RX ADMIN — Medication 15 MILLIGRAM(S): at 11:04

## 2022-12-31 RX ADMIN — SODIUM CHLORIDE 1000 MILLILITER(S): 9 INJECTION INTRAMUSCULAR; INTRAVENOUS; SUBCUTANEOUS at 11:04

## 2022-12-31 RX ADMIN — SODIUM CHLORIDE 1000 MILLILITER(S): 9 INJECTION INTRAMUSCULAR; INTRAVENOUS; SUBCUTANEOUS at 11:34

## 2022-12-31 NOTE — ED PROVIDER NOTE - NSFOLLOWUPINSTRUCTIONS_ED_ALL_ED_FT
Fever    A fever is an increase in the body's temperature above 100.4°F (38°C) or higher. In adults and children older than three months, a brief mild or moderate fever generally has no long-term effect, and it usually does not require treatment. Many times, fevers are the result of viral infections, which are self-resolving.  However, certain symptoms or diagnostic tests may suggest a bacterial infection that may respond to antibiotics. Take medications as directed by your health care provider.    SEEK IMMEDIATE MEDICAL CARE IF YOU OR YOUR CHILD HAVE ANY OF THE FOLLOWING SYMPTOMS : shortness of breath, seizure, rash/stiff neck/headache, severe abdominal pain, persistent vomiting, any signs of dehydration, or if your child has a fever for over five (5) days.    Cough    Coughing is a reflex that clears your throat and your airways. Coughing helps to heal and protect your lungs. It is normal to cough occasionally, but a cough that happens with other symptoms or lasts a long time may be a sign of a condition that needs treatment. Coughing may be caused by infections, asthma or COPD, smoking, postnasal drip, gastroesophageal reflux, as well as other medical conditions. Take medicines only as instructed by your health care provider. Avoid environments or triggers that causes you to cough at work or at home.    SEEK IMMEDIATE MEDICAL CARE IF YOU HAVE ANY OF THE FOLLOWING SYMPTOMS: coughing up blood, shortness of breath, rapid heart rate, chest pain, unexplained weight loss or night sweats.    Please follow up with your primary care doctor within one week

## 2022-12-31 NOTE — ED PROVIDER NOTE - PHYSICAL EXAMINATION
CONSTITUTIONAL: Well-appearing; well-nourished; in no apparent distress.   EYES: PERRL; EOM intact.   ENT: normal nose; no rhinorrhea; normal pharynx with no tonsillar hypertrophy.   NECK: Supple; non-tender; no cervical lymphadenopathy  CARDIOVASCULAR: Normal S1, S2; no murmurs, rubs, or gallops. Equal radial pulses  RESPIRATORY: Normal chest excursion with respiration; breath sounds clear and equal bilaterally; no wheezes, rhonchi, or rales.  GI/: Normal bowel sounds; non-distended; non-tender; no palpable organomegaly.   MS: No evidence of trauma or deformity.  Normal ROM in all four extremities; non-tender to palpation; distal pulses are normal.   SKIN: Normal for age and race; warm; dry; good turgor; no apparent lesions or exudate.   NEURO/PSYCH: A & O x 4; grossly unremarkable. mood and manner are appropriate. Grooming and personal hygiene are appropriate.

## 2022-12-31 NOTE — ED PROVIDER NOTE - PATIENT PORTAL LINK FT
You can access the FollowMyHealth Patient Portal offered by [facility] by registering at the following website: [email or web address]. By joining [employer]’s FollowMyHealth portal, you will also be able to view your health information using other applications (apps) compatible with our system.

## 2022-12-31 NOTE — ED PROVIDER NOTE - NS ED ROS FT
Constitutional: + fever/chills, weakness  Eyes: no redness/discharge/pain/vision changes  ENT: + nasal congestion./ No ear pain/sore throat  Cardiac: No chest pain, SOB or edema.  Respiratory: + cough. No respiratory distress  GI: No nausea, vomiting, diarrhea or abdominal pain.  : No dysuria, frequency, urgency or hematuria  MS: no pain to back or extremities, no loss of ROM, no weakness  Neuro: No headache or weakness. No LOC.  Skin: No skin rash.  Endocrine: + hx of diabetes.  Except as documented in the HPI, all other systems are negative.

## 2022-12-31 NOTE — ED ADULT TRIAGE NOTE - NS ED NURSE BANDS TYPE
Name band; Metronidazole Counseling:  I discussed with the patient the risks of metronidazole including but not limited to seizures, nausea/vomiting, a metallic taste in the mouth, nausea/vomiting and severe allergy.

## 2022-12-31 NOTE — ED PROVIDER NOTE - OBJECTIVE STATEMENT
82 year old M with hx of htn, hld, dm c/o 2 days of subjective fever, chills, body aches, dry cough, nasal congestion. No chest pain, sob, abd pain, nausea, vomiting, diarrhea, urinary symptoms, leg pain/swelling, sick contacts, recent travel, inability to tolerate po.

## 2022-12-31 NOTE — ED ADULT NURSE NOTE - PAIN: PRESENCE, MLM
Return sooner to the Emergency Department if increased difficulty urinating, pain, fever, vomiting, bleeding  complains of pain/discomfort

## 2022-12-31 NOTE — ED PROVIDER NOTE - CLINICAL SUMMARY MEDICAL DECISION MAKING FREE TEXT BOX
A/P; URI symptoms, otitis media, rales on exam, chest x-ray clear labs unremarkable, will DC home with azithromycin supportive care advised swab sent follow-up PMD 1 to 2 weeks, strict return precautions

## 2023-02-24 ENCOUNTER — EMERGENCY (EMERGENCY)
Facility: HOSPITAL | Age: 83
LOS: 0 days | Discharge: HOME | End: 2023-02-24
Attending: EMERGENCY MEDICINE
Payer: MEDICARE

## 2023-02-24 VITALS
OXYGEN SATURATION: 99 % | RESPIRATION RATE: 18 BRPM | TEMPERATURE: 99 F | SYSTOLIC BLOOD PRESSURE: 166 MMHG | HEART RATE: 61 BPM | DIASTOLIC BLOOD PRESSURE: 71 MMHG

## 2023-02-24 DIAGNOSIS — G89.29 OTHER CHRONIC PAIN: ICD-10-CM

## 2023-02-24 DIAGNOSIS — Z79.84 LONG TERM (CURRENT) USE OF ORAL HYPOGLYCEMIC DRUGS: ICD-10-CM

## 2023-02-24 DIAGNOSIS — M79.672 PAIN IN LEFT FOOT: ICD-10-CM

## 2023-02-24 DIAGNOSIS — Z96.652 PRESENCE OF LEFT ARTIFICIAL KNEE JOINT: ICD-10-CM

## 2023-02-24 DIAGNOSIS — E78.5 HYPERLIPIDEMIA, UNSPECIFIED: ICD-10-CM

## 2023-02-24 DIAGNOSIS — I10 ESSENTIAL (PRIMARY) HYPERTENSION: ICD-10-CM

## 2023-02-24 DIAGNOSIS — E11.9 TYPE 2 DIABETES MELLITUS WITHOUT COMPLICATIONS: ICD-10-CM

## 2023-02-24 DIAGNOSIS — Z96.652 PRESENCE OF LEFT ARTIFICIAL KNEE JOINT: Chronic | ICD-10-CM

## 2023-02-24 PROCEDURE — 73630 X-RAY EXAM OF FOOT: CPT | Mod: 26,LT

## 2023-02-24 PROCEDURE — 73630 X-RAY EXAM OF FOOT: CPT | Mod: LT

## 2023-02-24 PROCEDURE — 99284 EMERGENCY DEPT VISIT MOD MDM: CPT

## 2023-02-24 PROCEDURE — 99283 EMERGENCY DEPT VISIT LOW MDM: CPT

## 2023-02-24 RX ORDER — IBUPROFEN 200 MG
600 TABLET ORAL ONCE
Refills: 0 | Status: COMPLETED | OUTPATIENT
Start: 2023-02-24 | End: 2023-02-24

## 2023-02-24 RX ADMIN — Medication 600 MILLIGRAM(S): at 19:33

## 2023-02-24 NOTE — ED PROVIDER NOTE - PATIENT PORTAL LINK FT
You can access the FollowMyHealth Patient Portal offered by [facility] by registering at the following website: [email or web address]. By joining [employer]’s FollowMyHealth portal, you will also be able to view your health information using other applications (apps) compatible with our system.

## 2023-02-24 NOTE — ED PROVIDER NOTE - PHYSICAL EXAMINATION
CONSTITUTIONAL: Well-appearing; in no apparent distress.   HEAD: Normocephalic; atraumatic.   EYES: Pupils are round and reactive, extra-ocular muscles are intact. Eyelids are normal in appearance without swelling or lesions.   ENT: Hearing is intact with good acuity to spoken voice.  Patient is speaking clearly, not muffled and airway is intact.   RESPIRATORY: No signs of respiratory distress.   CARDIOVASCULAR: Regular rate and rhythm.   MS: L foot with no obvious deformity or swelling; nontender to palpation; full ROM; sensory function intact; distal pulse present. Rest of the upper and lower extremities unremarkable.   NEURO: A & O x 3. Normal speech. No focal deficit.  PSYCHOLOGICAL: Appropriate mood and affect. Good judgement and insight.

## 2023-02-24 NOTE — ED PROVIDER NOTE - ATTENDING APP SHARED VISIT CONTRIBUTION OF CARE
82-year-old male PMH HLD, HTN, DM presenting for evaluation of nontraumatic left foot pain for several months.  Pain is worse with ambulation, nonradiating and not associated with any additional complaints.  Patient has not taken anything for pain since the pain started.  He presents to ED today asking for an x-ray.  Well-appearing elderly male resting on the stretcher reading a book, no acute distress, normal-appearing lower extremities, no calf tenderness to palpation, painless range of motion at all joints, extremities are well-perfused and neurovascularly intact, mild tenderness to palpation over the dorsum of the left foot without palpable crepitus, erythema/ skin lesions, patient is awake and alert, no gross neurodeficits.  X-ray appears negative for acute pathology, patient was given a dose of analgesia, advised to take OTC pain medications, dosing and frequency was discussed, advised to follow-up with his primary care doctor this coming week.  Patient verbalized understanding is amenable with the plan.

## 2023-02-24 NOTE — ED PROVIDER NOTE - NSFOLLOWUPINSTRUCTIONS_ED_ALL_ED_FT
Please make sure to follow up with your primary care doctor in 3 days.    Foot Pain    Many things can cause foot pain. Some common causes are:     An injury.  A sprain.  Arthritis.  Blisters.  Bunions.    HOME CARE INSTRUCTIONS  Pay attention to any changes in your symptoms. Take these actions to help with your discomfort:    If directed, put ice on the affected area:  Put ice in a plastic bag.  Place a towel between your skin and the bag.  Leave the ice on for 15–20 minutes, 3?4 times a day for 2 days.  Take over-the-counter and prescription medicines only as told by your health care provider.  Wear comfortable, supportive shoes that fit you well. Do not wear high heels.  Do not stand or walk for long periods of time.  Do not lift a lot of weight. This can put added pressure on your feet.  Do stretches to relieve foot pain and stiffness as told by your health care provider.  Rub your foot gently.  Keep your feet clean and dry.    SEEK MEDICAL CARE IF:  Your pain does not get better after a few days of self-care.  Your pain gets worse.  You cannot stand on your foot.    SEEK IMMEDIATE MEDICAL CARE IF:  Your foot is numb or tingling.  Your foot or toes are swollen.  Your foot or toes turn white or blue.  You have warmth and redness along your foot.    ADDITIONAL NOTES AND INSTRUCTIONS    Please follow up with your Primary MD in 24-48 hr.  Seek immediate medical care for any new/worsening signs or symptoms.

## 2023-02-24 NOTE — ED PROVIDER NOTE - NSFOLLOWUPCLINICS_GEN_ALL_ED_FT
[facility]  Podiatry  .  [place]   Phone: [phone]  Fax:   Follow Up Time: 1-3 Days

## 2023-02-24 NOTE — ED PROVIDER NOTE - CLINICAL SUMMARY MEDICAL DECISION MAKING FREE TEXT BOX
Patient with chronic nontraumatic left foot pain for months.  Exam is reassuring, x-ray appears negative for acute pathology as interpreted by me.  Patient was given a dose of analgesia, is ambulating without difficulties, stable for discharge home, advised to follow-up with his PMD for further care.  Strict return precautions given.

## 2023-02-24 NOTE — ED PROVIDER NOTE - OBJECTIVE STATEMENT
82-year-old male with past medical history of hypertension, hyperlipidemia, and diabetes who presents with a left foot pain.  Reports that symptoms started several weeks ago; pain is intermittent and worse with movement.  Denies recent trauma or injury.  Denies fever, and apparent discomfort elsewhere.

## 2023-02-28 NOTE — ED ADULT NURSE NOTE - CAS TRG GEN SKIN CONDITION
From: Jamey Lopez.   To: Doris Hopkins MD  Sent: 2/28/2023 9:38 AM CST  Subject: Richard Mancilla     My ct scan came back today What does all this mean now
Warm/Dry

## 2023-05-21 ENCOUNTER — EMERGENCY (EMERGENCY)
Facility: HOSPITAL | Age: 83
LOS: 0 days | Discharge: HOME | End: 2023-05-21
Attending: EMERGENCY MEDICINE
Payer: MEDICARE

## 2023-05-21 VITALS
WEIGHT: 162.04 LBS | HEART RATE: 66 BPM | TEMPERATURE: 99 F | DIASTOLIC BLOOD PRESSURE: 79 MMHG | OXYGEN SATURATION: 96 % | RESPIRATION RATE: 20 BRPM | SYSTOLIC BLOOD PRESSURE: 170 MMHG | HEIGHT: 66 IN

## 2023-05-21 DIAGNOSIS — Z96.652 PRESENCE OF LEFT ARTIFICIAL KNEE JOINT: Chronic | ICD-10-CM

## 2023-05-21 DIAGNOSIS — Z79.84 LONG TERM (CURRENT) USE OF ORAL HYPOGLYCEMIC DRUGS: ICD-10-CM

## 2023-05-21 DIAGNOSIS — R09.81 NASAL CONGESTION: ICD-10-CM

## 2023-05-21 DIAGNOSIS — Z87.891 PERSONAL HISTORY OF NICOTINE DEPENDENCE: ICD-10-CM

## 2023-05-21 DIAGNOSIS — E78.5 HYPERLIPIDEMIA, UNSPECIFIED: ICD-10-CM

## 2023-05-21 DIAGNOSIS — U07.1 COVID-19: ICD-10-CM

## 2023-05-21 DIAGNOSIS — R05.1 ACUTE COUGH: ICD-10-CM

## 2023-05-21 DIAGNOSIS — J02.9 ACUTE PHARYNGITIS, UNSPECIFIED: ICD-10-CM

## 2023-05-21 DIAGNOSIS — E11.9 TYPE 2 DIABETES MELLITUS WITHOUT COMPLICATIONS: ICD-10-CM

## 2023-05-21 DIAGNOSIS — I10 ESSENTIAL (PRIMARY) HYPERTENSION: ICD-10-CM

## 2023-05-21 PROCEDURE — 71046 X-RAY EXAM CHEST 2 VIEWS: CPT

## 2023-05-21 PROCEDURE — 0241U: CPT

## 2023-05-21 PROCEDURE — 99284 EMERGENCY DEPT VISIT MOD MDM: CPT

## 2023-05-21 PROCEDURE — 71046 X-RAY EXAM CHEST 2 VIEWS: CPT | Mod: 26

## 2023-05-21 PROCEDURE — 99285 EMERGENCY DEPT VISIT HI MDM: CPT | Mod: 25

## 2023-05-21 NOTE — ED PROVIDER NOTE - CLINICAL SUMMARY MEDICAL DECISION MAKING FREE TEXT BOX
82-year-old male with viral URI with cough.  Normal vital signs, patient appears very well, no hypoxia, tachypnea or dyspnea.  COVID/flu swab sent, chest x-ray interpreted by me as negative for acute pathology.  Patient was reassured, advised to follow-up with his PMD, return to emergency room immediately if any worsening/concerning symptoms.  Patient verbalized understanding is amenable to plan.

## 2023-05-21 NOTE — ED ADULT NURSE NOTE - NSFALLUNIVINTERV_ED_ALL_ED
Bed/Stretcher in lowest position, wheels locked, appropriate side rails in place/Call bell, personal items and telephone in reach/Instruct patient to call for assistance before getting out of bed/chair/stretcher/Non-slip footwear applied when patient is off stretcher/Central to call system/Physically safe environment - no spills, clutter or unnecessary equipment/Purposeful proactive rounding/Room/bathroom lighting operational, light cord in reach

## 2023-05-21 NOTE — ED PROVIDER NOTE - WR INTERPRETATION 1
MOB with NB, providing all cares for NB. Attentive to all of NB needs.  Plans to DC home in am. no acute pulmonary process

## 2023-05-21 NOTE — ED PROVIDER NOTE - OBJECTIVE STATEMENT
81 yo M with pmhx of HTN, HLD, DM presenting for evaluation of congestion, sore throat, rhinorrhea, and cough over the last 3 days. No cp, sob, fever, chills, abdominal pain, nausea, vomiting, diarrhea, back pain, urinary symptoms, headache, dizziness, paresthesias, or weakness.

## 2023-05-21 NOTE — ED PROVIDER NOTE - PATIENT PORTAL LINK FT
You can access the FollowMyHealth Patient Portal offered by [facility] by registering at the following website: [email or web address]. By joining [employer]’s FollowMyHealth portal, you will also be able to view your health information using other applications (apps) compatible with our system.

## 2023-05-21 NOTE — ED PROVIDER NOTE - ATTENDING APP SHARED VISIT CONTRIBUTION OF CARE
82-year-old male PMH DM, HTN, HLD, former smoker (decades ago) presenting for evaluation of mild sore throat, nasal congestion/runny nose and cough for the past 3 days.  Denies any associated fever chills, no nausea/ vomiting, no myalgias, no chest pain or shortness of breath, no leg pain or swelling, no change in exercise tolerance, no skin rash abdominal pain or any other additional complaints.  No recent travel.  Well-appearing well-nourished elderly male in NAD, head AT/NC, PERRL, pink conjunctivae,  mmm, nml oropharynx, nml phonation without drooling or trismus, supple neck, + nasal congestion/clear rhinorrhea, nml ear exam b/l, nml work of breathing, faint bibasilar inspiratory crackles, equal air entry, RRR, well-perfused extremities, distal pulses intact, abdomen soft, NT/ND, BS present in all quadrants, no midline spine or CVA ttp, no leg edema or unilateral calf swelling, A&Ox3, no focal neuro deficits, nml mood and affect. Impression/plan: Most likely viral URI with cough/postnasal drip.  Given age/comorbidities will obtain chest x-ray.  Anticipatory guidance provided, likely discharge home.  Patient advised to follow-up with his primary care doctor next few days or return to the emergency room immediately for any worsening/concerning symptoms.  He verbalized understanding is amenable with the plan.

## 2023-05-22 LAB
FLUAV AG NPH QL: SIGNIFICANT CHANGE UP
FLUBV AG NPH QL: SIGNIFICANT CHANGE UP
RSV RNA NPH QL NAA+NON-PROBE: SIGNIFICANT CHANGE UP
SARS-COV-2 RNA SPEC QL NAA+PROBE: DETECTED

## 2023-07-30 NOTE — ED PROVIDER NOTE - NSFOLLOWUPINSTRUCTIONS_ED_ALL_ED_FT
Please follow up with your primary care physician within 24-72 hours and return immediately if symptoms worsen.    Gastroesophageal Reflux Disease, Adult    Normally, food travels down the esophagus and stays in the stomach to be digested. However, when a person has gastroesophageal reflux disease (GERD), food and stomach acid move back up into the esophagus. When this happens, the esophagus becomes sore and inflamed. Over time, GERD can create small holes (ulcers) in the lining of the esophagus.     CAUSES  This condition is caused by a problem with the muscle between the esophagus and the stomach (lower esophageal sphincter, or LES). Normally, the LES muscle closes after food passes through the esophagus to the stomach. When the LES is weakened or abnormal, it does not close properly, and that allows food and stomach acid to go back up into the esophagus. The LES can be weakened by certain dietary substances, medicines, and medical conditions, including:    Tobacco use.  Pregnancy.  Having a hiatal hernia.  Heavy alcohol use.  Certain foods and beverages, such as coffee, chocolate, onions, and peppermint.    RISK FACTORS  This condition is more likely to develop in:    People who have an increased body weight.  People who have connective tissue disorders.  People who use NSAID medicines.    SYMPTOMS  Symptoms of this condition include:    Heartburn.  Difficult or painful swallowing.  The feeling of having a lump in the throat.  A bitter taste in the mouth.  Bad breath.  Having a large amount of saliva.  Having an upset or bloated stomach.  Belching.  Chest pain.  Shortness of breath or wheezing.  Ongoing (chronic) cough or a night-time cough.  Wearing away of tooth enamel.  Weight loss.    Different conditions can cause chest pain. Make sure to see your health care provider if you experience chest pain.    DIAGNOSIS  Your health care provider will take a medical history and perform a physical exam. To determine if you have mild or severe GERD, your health care provider may also monitor how you respond to treatment. You may also have other tests, including:    An endoscopy to examine your stomach and esophagus with a small camera.  A test that measures the acidity level in your esophagus.  A test that measures how much pressure is on your esophagus.  A barium swallow or modified barium swallow to show the shape, size, and functioning of your esophagus.    TREATMENT  The goal of treatment is to help relieve your symptoms and to prevent complications. Treatment for this condition may vary depending on how severe your symptoms are. Your health care provider may recommend:    Changes to your diet.  Medicine.  Surgery.    HOME CARE INSTRUCTIONS  Diet    Follow a diet as recommended by your health care provider. This may involve avoiding foods and drinks such as:  Coffee and tea (with or without caffeine).  Drinks that contain alcohol.  Energy drinks and sports drinks.  Carbonated drinks or sodas.  Chocolate and cocoa.  Peppermint and mint flavorings.  Garlic and onions.  Horseradish.  Spicy and acidic foods, including peppers, chili powder, cristina powder, vinegar, hot sauces, and barbecue sauce.  Citrus fruit juices and citrus fruits, such as oranges, luis, and limes.  Tomato-based foods, such as red sauce, chili, salsa, and pizza with red sauce.  Fried and fatty foods, such as donuts, french fries, potato chips, and high-fat dressings.  High-fat meats, such as hot dogs and fatty cuts of red and white meats, such as rib eye steak, sausage, ham, and lovell.  High-fat dairy items, such as whole milk, butter, and cream cheese.  Eat small, frequent meals instead of large meals.  Avoid drinking large amounts of liquid with your meals.  Avoid eating meals during the 2–3 hours before bedtime.  Avoid lying down right after you eat.  Do not exercise right after you eat.     General Instructions     Pay attention to any changes in your symptoms.  Take over-the-counter and prescription medicines only as told by your health care provider. Do not take aspirin, ibuprofen, or other NSAIDs unless your health care provider told you to do so.  Do not use any tobacco products, including cigarettes, chewing tobacco, and e-cigarettes. If you need help quitting, ask your health care provider.  Wear loose-fitting clothing. Do not wear anything tight around your waist that causes pressure on your abdomen.  Raise (elevate) the head of your bed 6 inches (15cm).  Try to reduce your stress, such as with yoga or meditation. If you need help reducing stress, ask your health care provider.  If you are overweight, reduce your weight to an amount that is healthy for you. Ask your health care provider for guidance about a safe weight loss goal.  Keep all follow-up visits as told by your health care provider. This is important.    SEEK MEDICAL CARE IF:  You have new symptoms.  You have unexplained weight loss.  You have difficulty swallowing, or it hurts to swallow.  You have wheezing or a persistent cough.  Your symptoms do not improve with treatment.  You have a hoarse voice.    SEEK IMMEDIATE MEDICAL CARE IF:  You have pain in your arms, neck, jaw, teeth, or back.  You feel sweaty, dizzy, or light-headed.  You have chest pain or shortness of breath.  You vomit and your vomit looks like blood or coffee grounds.  You faint.  Your stool is bloody or black.  You cannot swallow, drink, or eat.    ADDITIONAL NOTES AND INSTRUCTIONS    Please follow up with your Primary MD in 24-48 hr.  Seek immediate medical care for any new/worsening signs or symptoms.
No (0)

## 2023-08-21 NOTE — ED ADULT NURSE NOTE - CCCP TRG CHIEF CMPLNT
see chief complaint quote Cheek Interpolation Flap Text: A decision was made to reconstruct the defect utilizing an interpolation axial flap and a staged reconstruction.  A telfa template was made of the defect.  This telfa template was then used to outline the Cheek Interpolation flap.  The donor area for the pedicle flap was then injected with anesthesia.  The flap was excised through the skin and subcutaneous tissue down to the layer of the underlying musculature.  The interpolation flap was carefully excised within this deep plane to maintain its blood supply.  The edges of the donor site were undermined.   The donor site was closed in a primary fashion.  The pedicle was then rotated into position and sutured.  Once the tube was sutured into place, adequate blood supply was confirmed with blanching and refill.  The pedicle was then wrapped with xeroform gauze and dressed appropriately with a telfa and gauze bandage to ensure continued blood supply and protect the attached pedicle.

## 2023-12-10 ENCOUNTER — EMERGENCY (EMERGENCY)
Facility: HOSPITAL | Age: 83
LOS: 0 days | Discharge: HOME | End: 2023-12-10
Attending: EMERGENCY MEDICINE
Payer: MEDICARE

## 2023-12-10 VITALS
SYSTOLIC BLOOD PRESSURE: 191 MMHG | TEMPERATURE: 98 F | OXYGEN SATURATION: 97 % | HEART RATE: 65 BPM | HEIGHT: 66 IN | DIASTOLIC BLOOD PRESSURE: 90 MMHG | WEIGHT: 160.06 LBS | RESPIRATION RATE: 18 BRPM

## 2023-12-10 DIAGNOSIS — E78.5 HYPERLIPIDEMIA, UNSPECIFIED: ICD-10-CM

## 2023-12-10 DIAGNOSIS — M25.512 PAIN IN LEFT SHOULDER: ICD-10-CM

## 2023-12-10 DIAGNOSIS — Z96.652 PRESENCE OF LEFT ARTIFICIAL KNEE JOINT: Chronic | ICD-10-CM

## 2023-12-10 DIAGNOSIS — M19.90 UNSPECIFIED OSTEOARTHRITIS, UNSPECIFIED SITE: ICD-10-CM

## 2023-12-10 DIAGNOSIS — M25.511 PAIN IN RIGHT SHOULDER: ICD-10-CM

## 2023-12-10 DIAGNOSIS — G89.29 OTHER CHRONIC PAIN: ICD-10-CM

## 2023-12-10 DIAGNOSIS — Z76.0 ENCOUNTER FOR ISSUE OF REPEAT PRESCRIPTION: ICD-10-CM

## 2023-12-10 DIAGNOSIS — I10 ESSENTIAL (PRIMARY) HYPERTENSION: ICD-10-CM

## 2023-12-10 DIAGNOSIS — R52 PAIN, UNSPECIFIED: ICD-10-CM

## 2023-12-10 PROCEDURE — 99281 EMR DPT VST MAYX REQ PHY/QHP: CPT

## 2023-12-10 PROCEDURE — 99283 EMERGENCY DEPT VISIT LOW MDM: CPT

## 2023-12-10 NOTE — ED PROVIDER NOTE - CLINICAL SUMMARY MEDICAL DECISION MAKING FREE TEXT BOX
Patient presented requesting a dose of Percocet.  Patient has prescription for Percocet but pharmacy is currently out of stock and they will refill tomorrow.  Patient does not want any prescription, just wants one-time dose for his chronic pain.  Denies any other active complaints.  Patient otherwise afebrile, hemodynamically stable, well-appearing.  Exam unremarkable.  Given dose of Percocet and will discharge home with outpatient follow-up.  Patient agreeable with plan. Agrees to return to ED for any new or worsening symptoms.

## 2023-12-10 NOTE — ED PROVIDER NOTE - NSFOLLOWUPINSTRUCTIONS_ED_ALL_ED_FT
Chronic Pain, Adult  Chronic pain is a type of pain that lasts or keeps coming back for at least 3–6 months. You may have headaches, pain in the abdomen, or pain in other areas of the body. Chronic pain may be related to an illness, injury, or a health condition. Sometimes, the cause of chronic pain is not known.    Chronic pain can make it hard for you to do daily activities. If it is not treated, chronic pain can lead to anxiety and depression. Treatment depends on the cause of your pain and how severe it is. You may need to work with a pain specialist to come up with a treatment plan. Many people benefit from two or more types of treatment to control their pain.    Follow these instructions at home:  Treatment plan    A person doing a yoga stretch.  Follow your treatment plan as told by your health care provider. This may include:  Gentle, regular exercise.  Eating a healthy diet that includes foods such as vegetables, fruits, fish, and lean meats.  Mental health therapy (cognitive or behavioral therapy) that changes the way you think or act in response to the pain. This may help improve how you feel.  Doing physical therapy exercises to improve movement and strength.  Meditation, yoga, acupuncture, or massage therapy.  Using the oils from plants in your environment or on your skin (aromatherapy).  Other treatments may include:  Over-the-counter or prescription medicines.  Color, light, or sound therapy.  Local electrical stimulation. The electrical pulses help to relieve pain by temporarily stopping the nerve impulses that cause you to feel pain.  Injections. These deliver numbing or pain-relieving medicines into the spine or the area of pain.  Medicines    Take over-the-counter and prescription medicines only as told by your health care provider.  Ask your health care provider if the medicine prescribed to you:  Requires you to avoid driving or using machinery.  Can cause constipation. You may need to take these actions to prevent or treat constipation:  Drink enough fluid to keep your urine pale yellow.  Take over-the-counter or prescription medicines.  Eat foods that are high in fiber, such as beans, whole grains, and fresh fruits and vegetables.  Limit foods that are high in fat and processed sugars, such as fried or sweet foods.  Lifestyle    A person using a pen to write in a notebook.  Ask your health care provider whether you should keep a pain diary. Your health care provider will tell you what information to write in the diary. This may include:  When you have pain.  What the pain feels like.  How medicines and other behaviors or treatments help to reduce the pain.  Consider talking with a mental health care provider about how to help manage chronic pain.  Consider joining a chronic pain support group.  Try to control or lower your stress levels. Talk with your health care provider about ways to do this.  General instructions    Learn as much as you can about how to manage your chronic pain. Ask your health care provider if an intensive pain rehabilitation program or a chronic pain specialist would be helpful.  Check your pain level as told by your health care provider. Ask your health care provider if you should use a pain scale.  Contact a health care provider if:  Your pain is not controlled with treatment.  You have new pain.  You have side effects from pain medicine.  You feel weak or you have trouble doing your normal activities.  You have trouble sleeping or you develop confusion.  You lose feeling or have numbness in your body.  You lose control of your bowels or bladder.  Get help right away if:  Your pain suddenly gets much worse.  You develop chest pain.  You have trouble breathing or shortness of breath.  You faint, or another person sees you faint.  These symptoms may be an emergency. Get help right away. Call 911.  Do not wait to see if the symptoms will go away.  Do not drive yourself to the hospital.  Also, get help right away if:  You have thoughts about hurting yourself or others.  Take one of these steps if you feel like you may hurt yourself or others, or have thoughts about taking your own life:  Go to your nearest emergency room.  Call 911.  Call the National Suicide Prevention Lifeline at 1-457.823.8254 or 099. This is open 24 hours a day.  Text the Crisis Text Line at 971920.  This information is not intended to replace advice given to you by your health care provider. Make sure you discuss any questions you have with your health care provider. Chronic Pain, Adult  Chronic pain is a type of pain that lasts or keeps coming back for at least 3–6 months. You may have headaches, pain in the abdomen, or pain in other areas of the body. Chronic pain may be related to an illness, injury, or a health condition. Sometimes, the cause of chronic pain is not known.    Chronic pain can make it hard for you to do daily activities. If it is not treated, chronic pain can lead to anxiety and depression. Treatment depends on the cause of your pain and how severe it is. You may need to work with a pain specialist to come up with a treatment plan. Many people benefit from two or more types of treatment to control their pain.    Follow these instructions at home:  Treatment plan    A person doing a yoga stretch.  Follow your treatment plan as told by your health care provider. This may include:  Gentle, regular exercise.  Eating a healthy diet that includes foods such as vegetables, fruits, fish, and lean meats.  Mental health therapy (cognitive or behavioral therapy) that changes the way you think or act in response to the pain. This may help improve how you feel.  Doing physical therapy exercises to improve movement and strength.  Meditation, yoga, acupuncture, or massage therapy.  Using the oils from plants in your environment or on your skin (aromatherapy).  Other treatments may include:  Over-the-counter or prescription medicines.  Color, light, or sound therapy.  Local electrical stimulation. The electrical pulses help to relieve pain by temporarily stopping the nerve impulses that cause you to feel pain.  Injections. These deliver numbing or pain-relieving medicines into the spine or the area of pain.  Medicines    Take over-the-counter and prescription medicines only as told by your health care provider.  Ask your health care provider if the medicine prescribed to you:  Requires you to avoid driving or using machinery.  Can cause constipation. You may need to take these actions to prevent or treat constipation:  Drink enough fluid to keep your urine pale yellow.  Take over-the-counter or prescription medicines.  Eat foods that are high in fiber, such as beans, whole grains, and fresh fruits and vegetables.  Limit foods that are high in fat and processed sugars, such as fried or sweet foods.  Lifestyle    A person using a pen to write in a notebook.  Ask your health care provider whether you should keep a pain diary. Your health care provider will tell you what information to write in the diary. This may include:  When you have pain.  What the pain feels like.  How medicines and other behaviors or treatments help to reduce the pain.  Consider talking with a mental health care provider about how to help manage chronic pain.  Consider joining a chronic pain support group.  Try to control or lower your stress levels. Talk with your health care provider about ways to do this.  General instructions    Learn as much as you can about how to manage your chronic pain. Ask your health care provider if an intensive pain rehabilitation program or a chronic pain specialist would be helpful.  Check your pain level as told by your health care provider. Ask your health care provider if you should use a pain scale.  Contact a health care provider if:  Your pain is not controlled with treatment.  You have new pain.  You have side effects from pain medicine.  You feel weak or you have trouble doing your normal activities.  You have trouble sleeping or you develop confusion.  You lose feeling or have numbness in your body.  You lose control of your bowels or bladder.  Get help right away if:  Your pain suddenly gets much worse.  You develop chest pain.  You have trouble breathing or shortness of breath.  You faint, or another person sees you faint.  These symptoms may be an emergency. Get help right away. Call 911.  Do not wait to see if the symptoms will go away.  Do not drive yourself to the hospital.  Also, get help right away if:  You have thoughts about hurting yourself or others.  Take one of these steps if you feel like you may hurt yourself or others, or have thoughts about taking your own life:  Go to your nearest emergency room.  Call 911.  Call the National Suicide Prevention Lifeline at 1-814.432.1982 or 281. This is open 24 hours a day.  Text the Crisis Text Line at 654098.  This information is not intended to replace advice given to you by your health care provider. Make sure you discuss any questions you have with your health care provider.

## 2023-12-10 NOTE — ED PROVIDER NOTE - OBJECTIVE STATEMENT
82 yo male w/ chronic pain, OA, HTN, HLD, presents to the ED complaining of body pain. Patient requesting does of percocet as he ran out of his prescription. Patient has prescription for percocet, but pharmacy was out of stock - says that he will get his refill tomorrow. Does not want a new prescription. No fever, cp, sob, hx of IV drug use. 84 yo male w/ chronic pain, OA, HTN, HLD, presents to the ED complaining of body pain. Patient requesting does of percocet as he ran out of his prescription. Patient has prescription for percocet, but pharmacy was out of stock - says that he will get his refill tomorrow. Does not want a new prescription. No fever, cp, sob, hx of IV drug use.

## 2023-12-10 NOTE — ED PROVIDER NOTE - PATIENT PORTAL LINK FT
You can access the FollowMyHealth Patient Portal offered by [facility] by registering at the following website: [email or web address]. By joining [facility]’s FollowMyHealth portal, you will also be able to view your health information using other applications (apps) compatible with our system. You can access the FollowMyHealth Patient Portal offered by [facility] by registering at the following website: [email or web address]. By joining [facility]’s FollowMyHealth portal, you will also be able to view your health information using other applications (apps) compatible with our system.

## 2023-12-12 ENCOUNTER — APPOINTMENT (OUTPATIENT)
Dept: UROLOGY | Facility: CLINIC | Age: 83
End: 2023-12-12
Payer: MEDICARE

## 2023-12-12 DIAGNOSIS — R35.1 NOCTURIA: ICD-10-CM

## 2023-12-12 PROCEDURE — 99214 OFFICE O/P EST MOD 30 MIN: CPT

## 2024-02-23 ENCOUNTER — EMERGENCY (EMERGENCY)
Facility: HOSPITAL | Age: 84
LOS: 0 days | Discharge: HOME | End: 2024-02-23
Attending: EMERGENCY MEDICINE
Payer: MEDICARE

## 2024-02-23 VITALS
RESPIRATION RATE: 18 BRPM | TEMPERATURE: 98 F | HEART RATE: 52 BPM | SYSTOLIC BLOOD PRESSURE: 190 MMHG | DIASTOLIC BLOOD PRESSURE: 86 MMHG | OXYGEN SATURATION: 96 %

## 2024-02-23 VITALS
HEART RATE: 58 BPM | SYSTOLIC BLOOD PRESSURE: 198 MMHG | DIASTOLIC BLOOD PRESSURE: 81 MMHG | TEMPERATURE: 98 F | OXYGEN SATURATION: 98 % | HEIGHT: 66 IN | RESPIRATION RATE: 18 BRPM | WEIGHT: 154.98 LBS

## 2024-02-23 DIAGNOSIS — Z87.891 PERSONAL HISTORY OF NICOTINE DEPENDENCE: ICD-10-CM

## 2024-02-23 DIAGNOSIS — E78.5 HYPERLIPIDEMIA, UNSPECIFIED: ICD-10-CM

## 2024-02-23 DIAGNOSIS — R31.9 HEMATURIA, UNSPECIFIED: ICD-10-CM

## 2024-02-23 DIAGNOSIS — N39.0 URINARY TRACT INFECTION, SITE NOT SPECIFIED: ICD-10-CM

## 2024-02-23 DIAGNOSIS — Z96.652 PRESENCE OF LEFT ARTIFICIAL KNEE JOINT: Chronic | ICD-10-CM

## 2024-02-23 DIAGNOSIS — I10 ESSENTIAL (PRIMARY) HYPERTENSION: ICD-10-CM

## 2024-02-23 DIAGNOSIS — E11.9 TYPE 2 DIABETES MELLITUS WITHOUT COMPLICATIONS: ICD-10-CM

## 2024-02-23 LAB
ALBUMIN SERPL ELPH-MCNC: 4 G/DL — SIGNIFICANT CHANGE UP (ref 3.5–5.2)
ALP SERPL-CCNC: 67 U/L — SIGNIFICANT CHANGE UP (ref 30–115)
ALT FLD-CCNC: 16 U/L — SIGNIFICANT CHANGE UP (ref 0–41)
ANION GAP SERPL CALC-SCNC: 13 MMOL/L — SIGNIFICANT CHANGE UP (ref 7–14)
APPEARANCE UR: ABNORMAL
AST SERPL-CCNC: 13 U/L — SIGNIFICANT CHANGE UP (ref 0–41)
BACTERIA # UR AUTO: ABNORMAL /HPF
BASOPHILS # BLD AUTO: 0.06 K/UL — SIGNIFICANT CHANGE UP (ref 0–0.2)
BASOPHILS NFR BLD AUTO: 0.5 % — SIGNIFICANT CHANGE UP (ref 0–1)
BILIRUB SERPL-MCNC: 0.4 MG/DL — SIGNIFICANT CHANGE UP (ref 0.2–1.2)
BILIRUB UR-MCNC: ABNORMAL
BUN SERPL-MCNC: 21 MG/DL — HIGH (ref 10–20)
CALCIUM SERPL-MCNC: 9.1 MG/DL — SIGNIFICANT CHANGE UP (ref 8.4–10.5)
CAST: 12 /LPF — HIGH (ref 0–4)
CHLORIDE SERPL-SCNC: 106 MMOL/L — SIGNIFICANT CHANGE UP (ref 98–110)
CO2 SERPL-SCNC: 22 MMOL/L — SIGNIFICANT CHANGE UP (ref 17–32)
COLOR SPEC: ABNORMAL
CREAT SERPL-MCNC: 1.3 MG/DL — SIGNIFICANT CHANGE UP (ref 0.7–1.5)
DIFF PNL FLD: ABNORMAL
EGFR: 55 ML/MIN/1.73M2 — LOW
EOSINOPHIL # BLD AUTO: 0.38 K/UL — SIGNIFICANT CHANGE UP (ref 0–0.7)
EOSINOPHIL NFR BLD AUTO: 3 % — SIGNIFICANT CHANGE UP (ref 0–8)
GLUCOSE SERPL-MCNC: 240 MG/DL — HIGH (ref 70–99)
GLUCOSE UR QL: 100 MG/DL
HCT VFR BLD CALC: 40.3 % — LOW (ref 42–52)
HGB BLD-MCNC: 13.8 G/DL — LOW (ref 14–18)
IMM GRANULOCYTES NFR BLD AUTO: 0.3 % — SIGNIFICANT CHANGE UP (ref 0.1–0.3)
KETONES UR-MCNC: NEGATIVE MG/DL — SIGNIFICANT CHANGE UP
LEUKOCYTE ESTERASE UR-ACNC: ABNORMAL
LYMPHOCYTES # BLD AUTO: 1.46 K/UL — SIGNIFICANT CHANGE UP (ref 1.2–3.4)
LYMPHOCYTES # BLD AUTO: 11.7 % — LOW (ref 20.5–51.1)
MCHC RBC-ENTMCNC: 28.3 PG — SIGNIFICANT CHANGE UP (ref 27–31)
MCHC RBC-ENTMCNC: 34.2 G/DL — SIGNIFICANT CHANGE UP (ref 32–37)
MCV RBC AUTO: 82.8 FL — SIGNIFICANT CHANGE UP (ref 80–94)
MONOCYTES # BLD AUTO: 0.7 K/UL — HIGH (ref 0.1–0.6)
MONOCYTES NFR BLD AUTO: 5.6 % — SIGNIFICANT CHANGE UP (ref 1.7–9.3)
NEUTROPHILS # BLD AUTO: 9.85 K/UL — HIGH (ref 1.4–6.5)
NEUTROPHILS NFR BLD AUTO: 78.9 % — HIGH (ref 42.2–75.2)
NITRITE UR-MCNC: POSITIVE
NRBC # BLD: 0 /100 WBCS — SIGNIFICANT CHANGE UP (ref 0–0)
PH UR: 5.5 — SIGNIFICANT CHANGE UP (ref 5–8)
PLATELET # BLD AUTO: 230 K/UL — SIGNIFICANT CHANGE UP (ref 130–400)
PMV BLD: 11.3 FL — HIGH (ref 7.4–10.4)
POTASSIUM SERPL-MCNC: 4.5 MMOL/L — SIGNIFICANT CHANGE UP (ref 3.5–5)
POTASSIUM SERPL-SCNC: 4.5 MMOL/L — SIGNIFICANT CHANGE UP (ref 3.5–5)
PROT SERPL-MCNC: 6.7 G/DL — SIGNIFICANT CHANGE UP (ref 6–8)
PROT UR-MCNC: 300 MG/DL
RBC # BLD: 4.87 M/UL — SIGNIFICANT CHANGE UP (ref 4.7–6.1)
RBC # FLD: 13.5 % — SIGNIFICANT CHANGE UP (ref 11.5–14.5)
RBC CASTS # UR COMP ASSIST: >1900 /HPF — HIGH (ref 0–4)
SODIUM SERPL-SCNC: 141 MMOL/L — SIGNIFICANT CHANGE UP (ref 135–146)
SP GR SPEC: >1.03 — HIGH (ref 1–1.03)
SQUAMOUS # UR AUTO: 2 /HPF — SIGNIFICANT CHANGE UP (ref 0–5)
UROBILINOGEN FLD QL: 0.2 MG/DL — SIGNIFICANT CHANGE UP (ref 0.2–1)
WBC # BLD: 12.49 K/UL — HIGH (ref 4.8–10.8)
WBC # FLD AUTO: 12.49 K/UL — HIGH (ref 4.8–10.8)
WBC UR QL: 860 /HPF — HIGH (ref 0–5)

## 2024-02-23 PROCEDURE — 74177 CT ABD & PELVIS W/CONTRAST: CPT | Mod: 26,MC

## 2024-02-23 PROCEDURE — 36415 COLL VENOUS BLD VENIPUNCTURE: CPT

## 2024-02-23 PROCEDURE — 74177 CT ABD & PELVIS W/CONTRAST: CPT | Mod: MC

## 2024-02-23 PROCEDURE — 99284 EMERGENCY DEPT VISIT MOD MDM: CPT | Mod: 25

## 2024-02-23 PROCEDURE — 81001 URINALYSIS AUTO W/SCOPE: CPT

## 2024-02-23 PROCEDURE — 99285 EMERGENCY DEPT VISIT HI MDM: CPT

## 2024-02-23 PROCEDURE — 87077 CULTURE AEROBIC IDENTIFY: CPT

## 2024-02-23 PROCEDURE — 87086 URINE CULTURE/COLONY COUNT: CPT

## 2024-02-23 PROCEDURE — 85025 COMPLETE CBC W/AUTO DIFF WBC: CPT

## 2024-02-23 PROCEDURE — 80053 COMPREHEN METABOLIC PANEL: CPT

## 2024-02-23 PROCEDURE — 96374 THER/PROPH/DIAG INJ IV PUSH: CPT

## 2024-02-23 RX ORDER — CEFTRIAXONE 500 MG/1
1000 INJECTION, POWDER, FOR SOLUTION INTRAMUSCULAR; INTRAVENOUS ONCE
Refills: 0 | Status: COMPLETED | OUTPATIENT
Start: 2024-02-23 | End: 2024-02-23

## 2024-02-23 RX ORDER — CEFPODOXIME PROXETIL 100 MG
1 TABLET ORAL
Qty: 14 | Refills: 0
Start: 2024-02-23 | End: 2024-02-29

## 2024-02-23 RX ORDER — SODIUM CHLORIDE 9 MG/ML
1000 INJECTION, SOLUTION INTRAVENOUS ONCE
Refills: 0 | Status: COMPLETED | OUTPATIENT
Start: 2024-02-23 | End: 2024-02-23

## 2024-02-23 RX ADMIN — CEFTRIAXONE 100 MILLIGRAM(S): 500 INJECTION, POWDER, FOR SOLUTION INTRAMUSCULAR; INTRAVENOUS at 14:06

## 2024-02-23 RX ADMIN — SODIUM CHLORIDE 1000 MILLILITER(S): 9 INJECTION, SOLUTION INTRAVENOUS at 14:06

## 2024-02-23 NOTE — ED ADULT NURSE NOTE - NSFALLUNIVINTERV_ED_ALL_ED
Bed/Stretcher in lowest position, wheels locked, appropriate side rails in place/Call bell, personal items and telephone in reach/Instruct patient to call for assistance before getting out of bed/chair/stretcher/Non-slip footwear applied when patient is off stretcher/[place] to call system/Physically safe environment - no spills, clutter or unnecessary equipment/Purposeful proactive rounding/Room/bathroom lighting operational, light cord in reach

## 2024-02-23 NOTE — ED PROVIDER NOTE - PATIENT PORTAL LINK FT
You can access the FollowMyHealth Patient Portal offered by [facility] by registering at the following website: [email or web address]. By joining [facility]’s FollowMyHealth portal, you will also be able to view your health information using other applications (apps) compatible with our system.

## 2024-02-23 NOTE — ED PROVIDER NOTE - NSFOLLOWUPINSTRUCTIONS_ED_ALL_ED_FT
Our Emergency Department Referral Coordinators will be reaching out to you in the next 24-48 hours from 9:00am to 5:00pm with a follow up appointment. Please expect a phone call from the hospital in that time frame. If you do not receive a call or if you have any questions or concerns, you can reach them at   [phone]    Hematuria, Adult    Hematuria is blood in the urine. Blood may be visible in the urine, or it may be identified with a test. This condition can be caused by infections of the bladder, urethra, kidney, or prostate. Other possible causes include:  Kidney stones.  Cancer of the urinary tract.  Too much calcium in the urine.  Conditions that are passed from parent to child (inherited conditions).  Exercise that requires a lot of energy.  Infections can usually be treated with medicine, and a kidney stone usually will pass through your urine. If neither of these is the cause of your hematuria, more tests may be needed to identify the cause of your symptoms.    It is very important to tell your health care provider about any blood in your urine, even if it is painless or the blood stops without treatment. Blood in the urine, when it happens and then stops and then happens again, can be a symptom of a very serious condition, including cancer. There is no pain in the initial stages of many urinary cancers.    Follow these instructions at home:  Medicines    Take over-the-counter and prescription medicines only as told by your health care provider.  If you were prescribed an antibiotic medicine, take it as told by your health care provider. Do not stop taking the antibiotic even if you start to feel better.  Eating and drinking    Drink enough fluid to keep your urine pale yellow. It is recommended that you drink 3–4 quarts (2.8–3.8 L) a day. If you have been diagnosed with an infection, drinking cranberry juice in addition to large amounts of water is recommended.  Avoid caffeine, tea, and carbonated beverages. These tend to irritate the bladder.  Avoid alcohol because it may irritate the prostate (in males).  General instructions    If you have been diagnosed with a kidney stone, follow your health care provider's instructions about straining your urine to catch the stone.  Empty your bladder often. Avoid holding urine for long periods of time.  If you are female:  After a bowel movement, wipe from front to back and use each piece of toilet paper only once.  Empty your bladder before and after sex.  Pay attention to any changes in your symptoms. Tell your health care provider about any changes or any new symptoms.  It is up to you to get the results of any tests. Ask your health care provider, or the department that is doing the test, when your results will be ready.  Keep all follow-up visits. This is important.  Contact a health care provider if:  You develop back pain.  You have a fever or chills.  You have nausea or vomiting.  Your symptoms do not improve after 3 days.  Your symptoms get worse.  Get help right away if:  You develop severe vomiting and are unable to take medicine without vomiting.  You develop severe pain in your back or abdomen even though you are taking medicine.  You pass a large amount of blood in your urine.  You pass blood clots in your urine.  You feel very weak or like you might faint.  You faint.  Summary  Hematuria is blood in the urine. It has many possible causes.  It is very important that you tell your health care provider about any blood in your urine, even if it is painless or the blood stops without treatment.  Take over-the-counter and prescription medicines only as told by your health care provider.  Drink enough fluid to keep your urine pale yellow.  This information is not intended to replace advice given to you by your health care provider. Make sure you discuss any questions you have with your health care provider.    Document Revised: 08/18/2021 Document Reviewed: 08/18/2021

## 2024-02-23 NOTE — ED PROVIDER NOTE - OBJECTIVE STATEMENT
83-year-old male with past medical history of hypertension, hyperlipidemia, presents to the ED for evaluation of hematuria.  Patient woke up this morning with multiple episodes of hematuria.  No AC use associated dysuria.  Patient previously had been following with urologist several years ago however has not seen him in 3 years.  No abdominal pain, diarrhea, testicular pain penile discharge.

## 2024-02-23 NOTE — ED PROVIDER NOTE - CLINICAL SUMMARY MEDICAL DECISION MAKING FREE TEXT BOX
Pt with hematuria, found positive nitrites, will treat for uti.   imaging neg for acute pathology.  pt to f/u with uro outpt and pmd.  Pt was given strict return to ED precautions and pt indicated that he/she understood. Any ordered labs and EKG were reviewed, Dr. Madhavi Montana, attending physician.  Any imaging was ordered and reviewed by me, Dr. Madhavi Montana, attending physician.  Appropriate medications for patient's presenting complaints were ordered and effects were reassessed.  Patient's records, if available,  (prior hospital, ED visit, and/or nursing home notes if available) were reviewed.  Additional history was obtained from EMS, family, and/or PCP (when available).  Escalation to admission/observation was considered.  1) However patient feels much better and is comfortable with discharge.  Appropriate follow-up was arranged.

## 2024-02-23 NOTE — ED PROVIDER NOTE - ATTENDING APP SHARED VISIT CONTRIBUTION OF CARE
84 yo m with pmh of hld, htn, dm presents with painless hematuria.  pt says had a few episodes this morning and has since cleared.  pt denies use of asa or ac.  pt admits used to see a urologist for "infection" but has not needed to see for last few years.  pt denies dysuria.  no abd pain.  no flank pain.  no n/v/d.  no fever or chills.  exam: nad, ncat, perrl, eomi, mmm, rrr, ctab, abd soft, nt, nd, no cvat, aox3, no calf tenderness, no pitting edema imp: pt with painless hematuria, will check labs, ua, ct

## 2024-02-25 LAB
CULTURE RESULTS: ABNORMAL
SPECIMEN SOURCE: SIGNIFICANT CHANGE UP

## 2024-02-28 ENCOUNTER — EMERGENCY (EMERGENCY)
Facility: HOSPITAL | Age: 84
LOS: 0 days | Discharge: HOME | End: 2024-02-29
Attending: STUDENT IN AN ORGANIZED HEALTH CARE EDUCATION/TRAINING PROGRAM
Payer: MEDICARE

## 2024-02-28 VITALS
DIASTOLIC BLOOD PRESSURE: 79 MMHG | SYSTOLIC BLOOD PRESSURE: 169 MMHG | RESPIRATION RATE: 18 BRPM | WEIGHT: 154.98 LBS | OXYGEN SATURATION: 99 % | TEMPERATURE: 98 F | HEART RATE: 54 BPM | HEIGHT: 66 IN

## 2024-02-28 DIAGNOSIS — Z76.0 ENCOUNTER FOR ISSUE OF REPEAT PRESCRIPTION: ICD-10-CM

## 2024-02-28 DIAGNOSIS — Z96.652 PRESENCE OF LEFT ARTIFICIAL KNEE JOINT: Chronic | ICD-10-CM

## 2024-02-28 PROCEDURE — 99283 EMERGENCY DEPT VISIT LOW MDM: CPT

## 2024-02-28 PROCEDURE — 99281 EMR DPT VST MAYX REQ PHY/QHP: CPT

## 2024-02-28 NOTE — ED ADULT TRIAGE NOTE - CHIEF COMPLAINT QUOTE
Let me tell you what happen, I got a problem,. I was here Friday, they gave me an antibiotic for my problem. The day before I went to my doctor and he gave me this medicine but he no give me the needles. Then he disappear, he went to vacation.  I need a prescription for the needles (Lantus pens) - patient  Patient switched from Metformin to Lantus

## 2024-02-29 NOTE — ED PROVIDER NOTE - OBJECTIVE STATEMENT
82 yo M requesting refill of his lantus pen needles. no medical complaints. Not requesting abx. only needles. patient is able to show the lantus pens.

## 2024-02-29 NOTE — ED PROVIDER NOTE - PHYSICAL EXAMINATION
This is a chronic condition.  The patient uses Advair 2 puff daily.  He also uses albuterol as needed.  At the present time patient denies shortness of breath wheezing or significant cough.   CONSTITUTIONAL: Well-developed; well-nourished; in no acute distress.   SKIN: warm, dry  HEAD: Normocephalic; atraumatic.  EYES: PERRL, EOMI, no conjunctival erythema  ENT: No nasal discharge; airway clear.  NECK: Supple; non tender.  EXT: Normal ROM.  No clubbing, cyanosis or edema.   NEURO: Alert, oriented, grossly unremarkable.  PSYCH: Cooperative, appropriate.

## 2024-03-11 ENCOUNTER — RESULT CHARGE (OUTPATIENT)
Age: 84
End: 2024-03-11

## 2024-03-11 ENCOUNTER — LABORATORY RESULT (OUTPATIENT)
Age: 84
End: 2024-03-11

## 2024-03-11 ENCOUNTER — APPOINTMENT (OUTPATIENT)
Dept: UROLOGY | Facility: CLINIC | Age: 84
End: 2024-03-11
Payer: MEDICARE

## 2024-03-11 DIAGNOSIS — N39.0 URINARY TRACT INFECTION, SITE NOT SPECIFIED: ICD-10-CM

## 2024-03-11 DIAGNOSIS — R31.0 GROSS HEMATURIA: ICD-10-CM

## 2024-03-11 LAB
BILIRUB UR QL STRIP: NEGATIVE
COLLECTION METHOD: NORMAL
GLUCOSE UR-MCNC: NEGATIVE
HCG UR QL: 0.2 EU/DL
HGB UR QL STRIP.AUTO: NEGATIVE
KETONES UR-MCNC: NEGATIVE
LEUKOCYTE ESTERASE UR QL STRIP: NEGATIVE
NITRITE UR QL STRIP: NEGATIVE
PH UR STRIP: 6
PROT UR STRIP-MCNC: NEGATIVE
SP GR UR STRIP: 1.03

## 2024-03-11 PROCEDURE — 99214 OFFICE O/P EST MOD 30 MIN: CPT

## 2024-03-11 PROCEDURE — G2211 COMPLEX E/M VISIT ADD ON: CPT

## 2024-03-11 RX ORDER — TAMSULOSIN HYDROCHLORIDE 0.4 MG/1
0.4 CAPSULE ORAL
Qty: 90 | Refills: 3 | Status: ACTIVE | COMMUNITY
Start: 2022-10-03 | End: 1900-01-01

## 2024-03-11 NOTE — ADDENDUM
[FreeTextEntry1] : Patient's note was transcribed with the assistance of a medical scribe under the supervision of Dr. Hankins. I, Dr. Hankins, have reviewed the patient's chart and agree that it aligns with my medical decisions. Zulema Alcala, our scribe, also served as a chaperone for physical examination purposes.

## 2024-03-11 NOTE — ASSESSMENT
[FreeTextEntry1] : Robert Reyes is an 83-year-old male hx DM who presents with history of UTI, DM, BPH/LUTS and prior incomplete bladder emptying on Flomax.  Recent gross hematuria with CT evidence of cystitis however culture equivocal for UTI. Nonobstructing nephrolithiasis  Given this is not a clear-cut urinary tract infection we discussed options and my recommendation was for gross hematuria workup to rule out other cause such as bladder cancer patient is agreeable.  Other possibilities are patient passed a stone.  Plan - UA, UCx + UCyt today - f/u for cystoscopy  KUB for nephrolithiasis, patient is interested in shockwave lithotripsy - cont Flomax for chronic BPH/ stable LUTS.  future likely Rx finasteride as prostate was enlarged on CT scan, and prevention of sequelae of BPH  -Cystoscopy, discussed this endoscopic procedure with the patient and the associated risks

## 2024-03-11 NOTE — HISTORY OF PRESENT ILLNESS
[FreeTextEntry1] : Robert Reyes is an 83-year-old male hx DM who presents with history of UTI, DM, BPH/LUTS and prior incomplete bladder emptying on Flomax.  Pt presented to [facility] with gross hematuria and dysuria, states he visibly saw a blood clot come out. He had a nitrite pos urine and was given antibiotic course, which resolved gross hematuria and dysuria. Denies flank pain, gross hematuria, dysuria or associated symptoms.   CT AP w/ IV Cont 02/23/2024 - images independently reviewed by me - Agree with findings, diffuse bladder wall thickening. No hydronephrosis bl. Non obstructing LLP stone 5 mm, , appears visible on  imaging.  IMPRESSION: Diffuse urinary bladder wall thickening, concerning for cystitis. Correlate with urinalysis. Indeterminate left adrenal gland nodule. Recommend outpatient adrenal protocol CT for further evaluation. (ADRENALS: Indeterminate left adrenal gland nodule measuring 1.3 cm (series 4 image 42). Normal right adrenal gland. 06. KIDNEYS: Symmetric enhancement. No hydronephrosis. Nonobstructing subcentimeter left renal lower pole stone. Left renal cyst. Right renal hypodensity too small to further characterize.)  Labs 02/23/2024 Cr 1.3 GFR 55 stable from prior UA - significant hematuria, pyuria and proteinuria.  UCx - 49,00 coag neg staph  previously  Uroflow patient is visualized and PVR done today shows Valsalva voiding with intermittency.  Low flow.  Patient voided volume approximately 120 mL with a PVR of 13 mL.  Urine testing from October 2022 Culture no growth Microscopic add-on 0 RBCs and 0 WBCs UA negative for blood, nitrites, and leukocytes.  Bladder sono images 2020 demonstrating 65 g prostate with post with residual 3 mL. The lateral thickening.  Nov 2019 patient began to experience worsening lower urinary tract symptoms and dysuria as an emergency room where he was found to have urinary tract infection. He was prescribed antibiotics and reports that his symptoms resolved. He is to started Flomax and reports significant improvement in his lower urinary tract symptoms. Denies gross hematuria, dysuria or associated symptoms. Denies pelvic pain. Udip 2/3/20 shows glucosuria without microheme or pyurai  Denies  PMH including previous kidney stones, recurrent UTIs.  Family History: No  malignancies Soc hx retired  Old records reviewed PSA 3.11 9/2019 UCx klebsiella prescott sensitive >100,000 cfu 11/2019 UA neg for 3 or greater RBCs

## 2024-03-14 ENCOUNTER — OUTPATIENT (OUTPATIENT)
Dept: OUTPATIENT SERVICES | Facility: HOSPITAL | Age: 84
LOS: 1 days | End: 2024-03-14
Payer: MEDICARE

## 2024-03-14 ENCOUNTER — NON-APPOINTMENT (OUTPATIENT)
Age: 84
End: 2024-03-14

## 2024-03-14 ENCOUNTER — RESULT REVIEW (OUTPATIENT)
Age: 84
End: 2024-03-14

## 2024-03-14 DIAGNOSIS — Z96.652 PRESENCE OF LEFT ARTIFICIAL KNEE JOINT: Chronic | ICD-10-CM

## 2024-03-14 DIAGNOSIS — R31.0 GROSS HEMATURIA: ICD-10-CM

## 2024-03-14 LAB
BACTERIA UR CULT: NORMAL
BILIRUB UR QL STRIP: NEGATIVE
COLLECTION METHOD: NORMAL
GLUCOSE UR-MCNC: 250
HCG UR QL: 0.2 EU/DL
HGB UR QL STRIP.AUTO: NORMAL
KETONES UR-MCNC: NEGATIVE
LEUKOCYTE ESTERASE UR QL STRIP: NEGATIVE
NITRITE UR QL STRIP: NEGATIVE
PH UR STRIP: 5.5
PROT UR STRIP-MCNC: 300
SP GR UR STRIP: 1.03

## 2024-03-14 PROCEDURE — 74019 RADEX ABDOMEN 2 VIEWS: CPT | Mod: 26

## 2024-03-14 PROCEDURE — 74019 RADEX ABDOMEN 2 VIEWS: CPT

## 2024-03-15 DIAGNOSIS — R31.0 GROSS HEMATURIA: ICD-10-CM

## 2024-03-15 NOTE — ED PROVIDER NOTE - PHYSICAL EXAMINATION
Statement Selected
Gen: NAD, AOx3  Head: NCAT  HEENT: PERRL, oral mucosa moist, normal conjunctiva, oropharynx clear without exudate or erythema  Lung: CTAB, no respiratory distress, no wheezing, rales, rhonchi  CV: normal s1/s2, rrr, Normal perfusion, pulses 2+ throughout  Abd: soft, epigastric/umbilical tenderness, ND, no CVA tenderness  Genitourinary: no pelvic tenderness  MSK: No edema, no visible deformities, full range of motion in all 4 extremities  Neuro: No focal neurologic deficits  Skin: No rash   Psych: normal affect

## 2024-03-18 ENCOUNTER — NON-APPOINTMENT (OUTPATIENT)
Age: 84
End: 2024-03-18

## 2024-03-18 LAB — URINE CYTOLOGY: NORMAL

## 2024-04-30 ENCOUNTER — APPOINTMENT (OUTPATIENT)
Dept: UROLOGY | Facility: CLINIC | Age: 84
End: 2024-04-30
Payer: MEDICARE

## 2024-04-30 PROCEDURE — 99214 OFFICE O/P EST MOD 30 MIN: CPT | Mod: 25

## 2024-04-30 PROCEDURE — 81003 URINALYSIS AUTO W/O SCOPE: CPT | Mod: QW

## 2024-04-30 PROCEDURE — 52000 CYSTOURETHROSCOPY: CPT

## 2024-04-30 PROCEDURE — 99215 OFFICE O/P EST HI 40 MIN: CPT | Mod: 25

## 2024-04-30 RX ORDER — FINASTERIDE 5 MG/1
5 TABLET, FILM COATED ORAL
Qty: 90 | Refills: 3 | Status: ACTIVE | COMMUNITY
Start: 2024-04-30 | End: 1900-01-01

## 2024-04-30 NOTE — HISTORY OF PRESENT ILLNESS
[FreeTextEntry1] : Robert Reyes is an 83-year-old male hx DM who presents with history of UTI, DM, BPH/LUTS and prior incomplete bladder emptying on Flomax.  Recent gross hematuria with CT evidence of cystitis however culture equivocal for UTI. Nonobstructing left nephrolithiasis  Pt presents today for a cystoscopy. Denies flank pain, gross hematuria, dysuria or associated symptoms.  Stable LUTS.  KUB 03/14/2024 - images independently reviewed by me -agree with findings left renal stone seen IMPRESSION: 3 mm calculus overlies the lower pole of the left kidney. Linear calcification overlies the right renal pelvis which is vascular in nature. The psoas margins are unremarkable. There is no organomegaly. Degenerative changes of the lumbar sacral spine.  UA 03/11/2024 - glucosuria, proteinuria, ketone treace UCx - normal urogenital hayley  UCyt - NEGATIVE FOR HIGH GRADE UROTHELIAL CARCINOMA. Rare benign urothelial and squamous cells. Some inflammatory cells and red blood cells.  previously CT AP w/ IV Cont 02/23/2024 - images independently reviewed by me - Agree with findings, diffuse bladder wall thickening. No hydronephrosis bl. Non obstructing LLP stone 5 mm, , appears visible on  imaging.  IMPRESSION: Diffuse urinary bladder wall thickening, concerning for cystitis. Correlate with urinalysis. Indeterminate left adrenal gland nodule. Recommend outpatient adrenal protocol CT for further evaluation. (ADRENALS: Indeterminate left adrenal gland nodule measuring 1.3 cm (series 4 image 42). Normal right adrenal gland. 06. KIDNEYS: Symmetric enhancement. No hydronephrosis. Nonobstructing subcentimeter left renal lower pole stone. Left renal cyst. Right renal hypodensity too small to further characterize.)  Labs 02/23/2024 Cr 1.3 GFR 55 stable from prior UA - significant hematuria, pyuria and proteinuria.  UCx - 49,00 coag neg staph  Uroflow patient is visualized and PVR done today shows Valsalva voiding with intermittency.  Low flow.  Patient voided volume approximately 120 mL with a PVR of 13 mL.  Urine testing from October 2022 Culture no growth Microscopic add-on 0 RBCs and 0 WBCs UA negative for blood, nitrites, and leukocytes.  Bladder sono images 2020 demonstrating 65 g prostate with post with residual 3 mL. The lateral thickening.  Nov 2019 patient began to experience worsening lower urinary tract symptoms and dysuria as an emergency room where he was found to have urinary tract infection. He was prescribed antibiotics and reports that his symptoms resolved. He is to started Flomax and reports significant improvement in his lower urinary tract symptoms. Denies gross hematuria, dysuria or associated symptoms. Denies pelvic pain. Udip 2/3/20 shows glucosuria without microheme or pyurai  Denies  PMH including previous kidney stones, recurrent UTIs.  Family History: No  malignancies Social history: , retired, states non-smoker, former ,   Old records reviewed PSA 3.11 9/2019 UCx klebsiella prescott sensitive >100,000 cfu 11/2019 UA neg for 3 or greater RBCs

## 2024-04-30 NOTE — ASSESSMENT
[FreeTextEntry1] : Robert Reyes is an 83-year-old male hx DM who presents with history of UTI, DM, BPH/LUTS and prior incomplete bladder emptying on Flomax.  Recent gross hematuria with CT evidence of cystitis however culture equivocal for UTI. Nonobstructing nephrolithiasis.   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   - start finasteride for BPH/LUTS as patient has history of incomplete bladder emptying and has had sequelae of BPH including cystitis, hematuria - cont Flomax  - plan for TURBT, pt aware of he may require urethral catheter after procedure.  Potentially higher risk of infection for this endoscopic procedure discussed with the patient as he has a history of diabetes  Discuss risks, benefits and alternatives of the procedure. Discussed risks such as infection, bleeding, injury to the genitourinary tract, injury to ureteral orifices, bladder perforation and need for catheter.

## 2024-05-06 ENCOUNTER — NON-APPOINTMENT (OUTPATIENT)
Age: 84
End: 2024-05-06

## 2024-05-06 LAB
BILIRUB UR QL STRIP: NORMAL
COLLECTION METHOD: NORMAL
GLUCOSE UR-MCNC: NORMAL
HCG UR QL: 0.2 EU/DL
HGB UR QL STRIP.AUTO: NORMAL
KETONES UR-MCNC: NORMAL
LEUKOCYTE ESTERASE UR QL STRIP: NORMAL
NITRITE UR QL STRIP: NORMAL
PH UR STRIP: 5.5
PROT UR STRIP-MCNC: NORMAL
SP GR UR STRIP: 1.03
URINE CYTOLOGY: NORMAL

## 2024-05-28 ENCOUNTER — OUTPATIENT (OUTPATIENT)
Dept: OUTPATIENT SERVICES | Facility: HOSPITAL | Age: 84
LOS: 1 days | End: 2024-05-28
Payer: MEDICARE

## 2024-05-28 ENCOUNTER — RESULT REVIEW (OUTPATIENT)
Age: 84
End: 2024-05-28

## 2024-05-28 VITALS
DIASTOLIC BLOOD PRESSURE: 72 MMHG | OXYGEN SATURATION: 98 % | SYSTOLIC BLOOD PRESSURE: 138 MMHG | TEMPERATURE: 98 F | HEART RATE: 75 BPM | HEIGHT: 66 IN | RESPIRATION RATE: 18 BRPM | WEIGHT: 158.07 LBS

## 2024-05-28 DIAGNOSIS — Z96.652 PRESENCE OF LEFT ARTIFICIAL KNEE JOINT: Chronic | ICD-10-CM

## 2024-05-28 DIAGNOSIS — Z01.818 ENCOUNTER FOR OTHER PREPROCEDURAL EXAMINATION: ICD-10-CM

## 2024-05-28 DIAGNOSIS — R31.0 GROSS HEMATURIA: ICD-10-CM

## 2024-05-28 LAB
A1C WITH ESTIMATED AVERAGE GLUCOSE RESULT: 9.3 % — HIGH (ref 4–5.6)
ALBUMIN SERPL ELPH-MCNC: 4.1 G/DL — SIGNIFICANT CHANGE UP (ref 3.5–5.2)
ALP SERPL-CCNC: 68 U/L — SIGNIFICANT CHANGE UP (ref 30–115)
ALT FLD-CCNC: 15 U/L — SIGNIFICANT CHANGE UP (ref 0–41)
ANION GAP SERPL CALC-SCNC: 14 MMOL/L — SIGNIFICANT CHANGE UP (ref 7–14)
APPEARANCE UR: CLEAR — SIGNIFICANT CHANGE UP
APTT BLD: 29.3 SEC — SIGNIFICANT CHANGE UP (ref 27–39.2)
AST SERPL-CCNC: 14 U/L — SIGNIFICANT CHANGE UP (ref 0–41)
BASOPHILS # BLD AUTO: 0.04 K/UL — SIGNIFICANT CHANGE UP (ref 0–0.2)
BASOPHILS NFR BLD AUTO: 0.4 % — SIGNIFICANT CHANGE UP (ref 0–1)
BILIRUB SERPL-MCNC: 0.3 MG/DL — SIGNIFICANT CHANGE UP (ref 0.2–1.2)
BILIRUB UR-MCNC: NEGATIVE — SIGNIFICANT CHANGE UP
BUN SERPL-MCNC: 23 MG/DL — HIGH (ref 10–20)
CALCIUM SERPL-MCNC: 9 MG/DL — SIGNIFICANT CHANGE UP (ref 8.4–10.5)
CHLORIDE SERPL-SCNC: 106 MMOL/L — SIGNIFICANT CHANGE UP (ref 98–110)
CO2 SERPL-SCNC: 21 MMOL/L — SIGNIFICANT CHANGE UP (ref 17–32)
COLOR SPEC: YELLOW — SIGNIFICANT CHANGE UP
CREAT SERPL-MCNC: 1.4 MG/DL — SIGNIFICANT CHANGE UP (ref 0.7–1.5)
DIFF PNL FLD: ABNORMAL
EGFR: 50 ML/MIN/1.73M2 — LOW
EOSINOPHIL # BLD AUTO: 0.24 K/UL — SIGNIFICANT CHANGE UP (ref 0–0.7)
EOSINOPHIL NFR BLD AUTO: 2.4 % — SIGNIFICANT CHANGE UP (ref 0–8)
ESTIMATED AVERAGE GLUCOSE: 220 MG/DL — HIGH (ref 68–114)
GLUCOSE SERPL-MCNC: 278 MG/DL — HIGH (ref 70–99)
GLUCOSE UR QL: >=1000 MG/DL
HCT VFR BLD CALC: 42.1 % — SIGNIFICANT CHANGE UP (ref 42–52)
HGB BLD-MCNC: 13.7 G/DL — LOW (ref 14–18)
IMM GRANULOCYTES NFR BLD AUTO: 0.5 % — HIGH (ref 0.1–0.3)
INR BLD: 0.97 RATIO — SIGNIFICANT CHANGE UP (ref 0.65–1.3)
KETONES UR-MCNC: NEGATIVE MG/DL — SIGNIFICANT CHANGE UP
LEUKOCYTE ESTERASE UR-ACNC: ABNORMAL
LYMPHOCYTES # BLD AUTO: 1.53 K/UL — SIGNIFICANT CHANGE UP (ref 1.2–3.4)
LYMPHOCYTES # BLD AUTO: 15.5 % — LOW (ref 20.5–51.1)
MCHC RBC-ENTMCNC: 27 PG — SIGNIFICANT CHANGE UP (ref 27–31)
MCHC RBC-ENTMCNC: 32.5 G/DL — SIGNIFICANT CHANGE UP (ref 32–37)
MCV RBC AUTO: 82.9 FL — SIGNIFICANT CHANGE UP (ref 80–94)
MONOCYTES # BLD AUTO: 0.68 K/UL — HIGH (ref 0.1–0.6)
MONOCYTES NFR BLD AUTO: 6.9 % — SIGNIFICANT CHANGE UP (ref 1.7–9.3)
NEUTROPHILS # BLD AUTO: 7.33 K/UL — HIGH (ref 1.4–6.5)
NEUTROPHILS NFR BLD AUTO: 74.3 % — SIGNIFICANT CHANGE UP (ref 42.2–75.2)
NITRITE UR-MCNC: NEGATIVE — SIGNIFICANT CHANGE UP
NRBC # BLD: 0 /100 WBCS — SIGNIFICANT CHANGE UP (ref 0–0)
PH UR: 6 — SIGNIFICANT CHANGE UP (ref 5–8)
PLATELET # BLD AUTO: 240 K/UL — SIGNIFICANT CHANGE UP (ref 130–400)
PMV BLD: 11.4 FL — HIGH (ref 7.4–10.4)
POTASSIUM SERPL-MCNC: 4.8 MMOL/L — SIGNIFICANT CHANGE UP (ref 3.5–5)
POTASSIUM SERPL-SCNC: 4.8 MMOL/L — SIGNIFICANT CHANGE UP (ref 3.5–5)
PROT SERPL-MCNC: 6.6 G/DL — SIGNIFICANT CHANGE UP (ref 6–8)
PROT UR-MCNC: 300 MG/DL
PROTHROM AB SERPL-ACNC: 11.1 SEC — SIGNIFICANT CHANGE UP (ref 9.95–12.87)
RBC # BLD: 5.08 M/UL — SIGNIFICANT CHANGE UP (ref 4.7–6.1)
RBC # FLD: 13.4 % — SIGNIFICANT CHANGE UP (ref 11.5–14.5)
SODIUM SERPL-SCNC: 141 MMOL/L — SIGNIFICANT CHANGE UP (ref 135–146)
SP GR SPEC: 1.02 — SIGNIFICANT CHANGE UP (ref 1–1.03)
UROBILINOGEN FLD QL: 0.2 MG/DL — SIGNIFICANT CHANGE UP (ref 0.2–1)
WBC # BLD: 9.87 K/UL — SIGNIFICANT CHANGE UP (ref 4.8–10.8)
WBC # FLD AUTO: 9.87 K/UL — SIGNIFICANT CHANGE UP (ref 4.8–10.8)

## 2024-05-28 PROCEDURE — 99214 OFFICE O/P EST MOD 30 MIN: CPT | Mod: 25

## 2024-05-28 PROCEDURE — 93005 ELECTROCARDIOGRAM TRACING: CPT

## 2024-05-28 PROCEDURE — 81001 URINALYSIS AUTO W/SCOPE: CPT

## 2024-05-28 PROCEDURE — 80053 COMPREHEN METABOLIC PANEL: CPT

## 2024-05-28 PROCEDURE — 71046 X-RAY EXAM CHEST 2 VIEWS: CPT | Mod: 26

## 2024-05-28 PROCEDURE — 85610 PROTHROMBIN TIME: CPT

## 2024-05-28 PROCEDURE — 36415 COLL VENOUS BLD VENIPUNCTURE: CPT

## 2024-05-28 PROCEDURE — 71046 X-RAY EXAM CHEST 2 VIEWS: CPT

## 2024-05-28 PROCEDURE — 85025 COMPLETE CBC W/AUTO DIFF WBC: CPT

## 2024-05-28 PROCEDURE — 85730 THROMBOPLASTIN TIME PARTIAL: CPT

## 2024-05-28 PROCEDURE — 93010 ELECTROCARDIOGRAM REPORT: CPT

## 2024-05-28 PROCEDURE — 83036 HEMOGLOBIN GLYCOSYLATED A1C: CPT

## 2024-05-28 PROCEDURE — 87086 URINE CULTURE/COLONY COUNT: CPT

## 2024-05-28 NOTE — H&P PST ADULT - NSICDXPASTMEDICALHX_GEN_ALL_CORE_FT
PAST MEDICAL HISTORY:  BPH with urinary obstruction     Diabetes mellitus     HLD (hyperlipidemia)     HTN (hypertension)     Osteoarthritis

## 2024-05-28 NOTE — H&P PST ADULT - REASON FOR ADMISSION
84 y/o male presents for PAST in preparation for cystoscopy and TURBT on 6/14/2024 under general anesthesia with Dr. Hankins in [facility].

## 2024-05-28 NOTE — H&P PST ADULT - HISTORY OF PRESENT ILLNESS
82 y/o male with a hx DM, UTI, DM, BPH/LUTS and prior incomplete bladder emptying on Flomax. Recent gross hematuria with CT evidence of cystitis. Now scheduled  for cystoscopy and TURBT on 6/14/2024 under general anesthesia with Dr. Hankins in [facility].    Denies any chest pain, difficulty breathing, SOB, palpitations, dysuria, URI, or any other infections in the last 2 weeks/1 month. Denies any recent travel, contact, or exposure to any persons with known or suspected COVID-19. Pt also denies COVID testing within the last 2 weeks. Pt admits to receiving all doses of  COVID vaccine. Denies any suicidal or homicidal ideations. Pt advised to self quarantine until day of procedure. Exercise tolerance of 10-20 flights of stairs without dyspnea. EDISON reviewed with patient. Pt verbalized understanding of all pre-operative instructions.    Anesthesia Alert  NO--Difficult Airway CLASS II  NO--History of neck surgery or radiation  NO--Limited ROM of neck  NO--History of Malignant hyperthermia  NO--No personal or family history of Pseudocholinesterase deficiency.  NO--Prior Anesthesia Complication  NO--Latex Allergy  YES--Loose teeth, removable dentures   NO--History of Rheumatoid Arthritis  NO--EDISON  NO--Bleeding Risk  NO--Other_____    Duke Activity Status Index (DASI) from [employer]  on 5/28/2024  ** All calculations should be rechecked by clinician prior to use **    RESULT SUMMARY:  26.95 points  The higher the score (maximum 58.2), the higher the functional status.    6.05 METs        INPUTS:  Take care of self —> 2.75 = Yes  Walk indoors —> 1.75 = Yes  Walk 1&ndash;2 blocks on level ground —> 2.75 = Yes  Climb a flight of stairs or walk up a hill —> 5.5 = Yes  Run a short distance —> 8 = Yes  Do light work around the house —> 2.7 = Yes  Do moderate work around the house —> 3.5 = Yes  Do heavy work around the house —> 0 = No  Do yardwork —> 0 = No  Have sexual relations —> 0 = No  Participate in moderate recreational activities —> 0 = No  Participate in strenuous sports —> 0 = No    Revised Cardiac Risk Index for Pre-Operative Risk from [employer]  on 5/28/2024  ** All calculations should be rechecked by clinician prior to use **    RESULT SUMMARY:  1 points  Class II Risk    6.0 %  30-day risk of death, MI, or cardiac arrest    From Duceppe 2017. These numbers are higher than those from the original study (Shashank 1999). See Evidence for details.      INPUTS:  Elevated-risk surgery —> 0 = No  History of ischemic heart disease —> 0 = No  History of congestive heart failure —> 0 = No  History of cerebrovascular disease —> 0 = No  Pre-operative treatment with insulin —> 1 = Yes  Pre-operative creatinine >2 mg/dL / 176.8 µmol/L —> 0 = No

## 2024-05-29 DIAGNOSIS — R31.0 GROSS HEMATURIA: ICD-10-CM

## 2024-05-29 DIAGNOSIS — Z01.818 ENCOUNTER FOR OTHER PREPROCEDURAL EXAMINATION: ICD-10-CM

## 2024-05-29 LAB
CULTURE RESULTS: SIGNIFICANT CHANGE UP
SPECIMEN SOURCE: SIGNIFICANT CHANGE UP

## 2024-06-03 ENCOUNTER — EMERGENCY (EMERGENCY)
Facility: HOSPITAL | Age: 84
LOS: 0 days | Discharge: HOME | End: 2024-06-03
Attending: EMERGENCY MEDICINE
Payer: MEDICARE

## 2024-06-03 ENCOUNTER — NON-APPOINTMENT (OUTPATIENT)
Age: 84
End: 2024-06-03

## 2024-06-03 VITALS
WEIGHT: 160.06 LBS | TEMPERATURE: 98 F | DIASTOLIC BLOOD PRESSURE: 88 MMHG | HEART RATE: 61 BPM | SYSTOLIC BLOOD PRESSURE: 187 MMHG | RESPIRATION RATE: 18 BRPM | HEIGHT: 66 IN | OXYGEN SATURATION: 100 %

## 2024-06-03 DIAGNOSIS — I10 ESSENTIAL (PRIMARY) HYPERTENSION: ICD-10-CM

## 2024-06-03 DIAGNOSIS — R51.9 HEADACHE, UNSPECIFIED: ICD-10-CM

## 2024-06-03 DIAGNOSIS — Z96.652 PRESENCE OF LEFT ARTIFICIAL KNEE JOINT: Chronic | ICD-10-CM

## 2024-06-03 DIAGNOSIS — E78.5 HYPERLIPIDEMIA, UNSPECIFIED: ICD-10-CM

## 2024-06-03 DIAGNOSIS — E11.9 TYPE 2 DIABETES MELLITUS WITHOUT COMPLICATIONS: ICD-10-CM

## 2024-06-03 DIAGNOSIS — M54.2 CERVICALGIA: ICD-10-CM

## 2024-06-03 PROCEDURE — 70450 CT HEAD/BRAIN W/O DYE: CPT | Mod: 26,MC

## 2024-06-03 PROCEDURE — 70450 CT HEAD/BRAIN W/O DYE: CPT | Mod: MC

## 2024-06-03 PROCEDURE — 99284 EMERGENCY DEPT VISIT MOD MDM: CPT | Mod: 25

## 2024-06-03 PROCEDURE — 99284 EMERGENCY DEPT VISIT MOD MDM: CPT

## 2024-06-03 RX ORDER — KETOROLAC TROMETHAMINE 30 MG/ML
30 SYRINGE (ML) INJECTION ONCE
Refills: 0 | Status: DISCONTINUED | OUTPATIENT
Start: 2024-06-03 | End: 2024-06-03

## 2024-06-03 RX ORDER — METOCLOPRAMIDE HCL 10 MG
10 TABLET ORAL ONCE
Refills: 0 | Status: COMPLETED | OUTPATIENT
Start: 2024-06-03 | End: 2024-06-03

## 2024-06-03 RX ORDER — IBUPROFEN 200 MG
600 TABLET ORAL ONCE
Refills: 0 | Status: COMPLETED | OUTPATIENT
Start: 2024-06-03 | End: 2024-06-03

## 2024-06-03 RX ADMIN — Medication 600 MILLIGRAM(S): at 20:22

## 2024-06-03 RX ADMIN — Medication 10 MILLIGRAM(S): at 20:22

## 2024-06-03 NOTE — ED PROVIDER NOTE - ATTENDING CONTRIBUTION TO CARE
I have personally performed a history and physical exam on this patient and personally directed the management of the patient. Detail Level: Detailed

## 2024-06-03 NOTE — ED PROVIDER NOTE - OBJECTIVE STATEMENT
83-year-old male with past medical history of hyperlipidemia hypertension diabetes presents to the ED with headache associated with neck pain for the past 2 weeks.  Patient has been using advil at home w some relief.  Patient denies any trauma/fall/injury. Denies fever vision changes chest pain shortness of breath nausea vomiting numbness weakness tingling abnormal gait.

## 2024-06-03 NOTE — ED PROVIDER NOTE - PROGRESS NOTE DETAILS
Authored by Dr. Ngo: no evidence of acute mastoiditis, no mastoid tenderness or skin changes, no ear complaints. Explained this may be due to previous infection sequela. Recommend neuro and ENT f/u

## 2024-06-03 NOTE — ED PROVIDER NOTE - PROVIDER TOKENS
PROVIDER:[TOKEN:[[id number]],FOLLOWUP:[4-6 Days]],PROVIDER:[TOKEN:[[id number]],FOLLOWUP:[7-10 Days]]

## 2024-06-03 NOTE — ED PROVIDER NOTE - CLINICAL SUMMARY MEDICAL DECISION MAKING FREE TEXT BOX
No distress.  VSS.  Neuro non focal.  CT shows no acute findings, chronic sequelae of previous disease.  DC home.  F/U with ENT.  Strict return instructions discussed.

## 2024-06-03 NOTE — ED PROVIDER NOTE - CARE PROVIDERS DIRECT ADDRESSES
[email or web address]

## 2024-06-03 NOTE — ED PROVIDER NOTE - CARE PROVIDER_API CALL
Clarisa Rodriguez  Neurology  [address] [place] [id number]  Phone: [phone]  Fax: [phone]  Follow Up Time: 4-6 Days    Oliverio Douglas  Otolaryngology  [address] [place] [id number]  Phone: [phone]  Fax: [phone]  Follow Up Time: 7-10 Days

## 2024-06-03 NOTE — ED PROVIDER NOTE - PATIENT PORTAL LINK FT
You can access the FollowMyHealth Patient Portal offered by [facility] by registering at the following website: [email or web address]. By joining [employer]’s FollowMyHealth portal, you will also be able to view your health information using other applications (apps) compatible with our system.

## 2024-06-04 PROBLEM — N40.1 BENIGN PROSTATIC HYPERPLASIA WITH LOWER URINARY TRACT SYMPTOMS: Chronic | Status: ACTIVE | Noted: 2024-05-28

## 2024-06-04 PROBLEM — M19.90 UNSPECIFIED OSTEOARTHRITIS, UNSPECIFIED SITE: Chronic | Status: ACTIVE | Noted: 2024-05-28

## 2024-06-14 ENCOUNTER — OUTPATIENT (OUTPATIENT)
Dept: OUTPATIENT SERVICES | Facility: HOSPITAL | Age: 84
LOS: 1 days | Discharge: HOME | End: 2024-06-14
Payer: MEDICARE

## 2024-06-14 ENCOUNTER — RESULT REVIEW (OUTPATIENT)
Age: 84
End: 2024-06-14

## 2024-06-14 ENCOUNTER — APPOINTMENT (OUTPATIENT)
Dept: UROLOGY | Facility: HOSPITAL | Age: 84
End: 2024-06-14

## 2024-06-14 ENCOUNTER — TRANSCRIPTION ENCOUNTER (OUTPATIENT)
Age: 84
End: 2024-06-14

## 2024-06-14 ENCOUNTER — NON-APPOINTMENT (OUTPATIENT)
Age: 84
End: 2024-06-14

## 2024-06-14 VITALS
SYSTOLIC BLOOD PRESSURE: 177 MMHG | OXYGEN SATURATION: 94 % | TEMPERATURE: 98 F | HEART RATE: 51 BPM | WEIGHT: 160.06 LBS | HEIGHT: 66 IN | RESPIRATION RATE: 18 BRPM | DIASTOLIC BLOOD PRESSURE: 85 MMHG

## 2024-06-14 VITALS — RESPIRATION RATE: 16 BRPM | DIASTOLIC BLOOD PRESSURE: 71 MMHG | SYSTOLIC BLOOD PRESSURE: 151 MMHG | HEART RATE: 45 BPM

## 2024-06-14 DIAGNOSIS — R31.0 GROSS HEMATURIA: ICD-10-CM

## 2024-06-14 DIAGNOSIS — Z96.652 PRESENCE OF LEFT ARTIFICIAL KNEE JOINT: Chronic | ICD-10-CM

## 2024-06-14 DIAGNOSIS — Z98.49 CATARACT EXTRACTION STATUS, UNSPECIFIED EYE: Chronic | ICD-10-CM

## 2024-06-14 LAB — GLUCOSE BLDC GLUCOMTR-MCNC: 150 MG/DL — HIGH (ref 70–99)

## 2024-06-14 PROCEDURE — 52234 CYSTOSCOPY AND TREATMENT: CPT

## 2024-06-14 PROCEDURE — 52601 PROSTATECTOMY (TURP): CPT | Mod: 52

## 2024-06-14 PROCEDURE — 88307 TISSUE EXAM BY PATHOLOGIST: CPT

## 2024-06-14 PROCEDURE — 82962 GLUCOSE BLOOD TEST: CPT

## 2024-06-14 PROCEDURE — 88307 TISSUE EXAM BY PATHOLOGIST: CPT | Mod: 26

## 2024-06-14 RX ORDER — SITAGLIPTIN 50 MG/1
1 TABLET, FILM COATED ORAL
Refills: 0 | DISCHARGE

## 2024-06-14 RX ORDER — PIOGLITAZONE HYDROCHLORIDE 15 MG/1
1 TABLET ORAL
Refills: 0 | DISCHARGE

## 2024-06-14 RX ORDER — AMLODIPINE BESYLATE 2.5 MG/1
1 TABLET ORAL
Qty: 0 | Refills: 0 | DISCHARGE

## 2024-06-14 RX ORDER — MORPHINE SULFATE 50 MG/1
2 CAPSULE, EXTENDED RELEASE ORAL
Refills: 0 | Status: DISCONTINUED | OUTPATIENT
Start: 2024-06-14 | End: 2024-06-14

## 2024-06-14 RX ORDER — INSULIN GLARGINE 100 [IU]/ML
10 INJECTION, SOLUTION SUBCUTANEOUS
Refills: 0 | DISCHARGE

## 2024-06-14 RX ORDER — METFORMIN HYDROCHLORIDE 850 MG/1
1 TABLET ORAL
Qty: 0 | Refills: 0 | DISCHARGE

## 2024-06-14 RX ORDER — SODIUM CHLORIDE 9 MG/ML
1000 INJECTION, SOLUTION INTRAVENOUS
Refills: 0 | Status: DISCONTINUED | OUTPATIENT
Start: 2024-06-14 | End: 2024-06-14

## 2024-06-14 RX ORDER — OXYCODONE HYDROCHLORIDE 5 MG/1
5 TABLET ORAL ONCE
Refills: 0 | Status: DISCONTINUED | OUTPATIENT
Start: 2024-06-14 | End: 2024-06-14

## 2024-06-14 RX ORDER — DICLOFENAC SODIUM 75 MG/1
1 TABLET, DELAYED RELEASE ORAL
Refills: 0 | DISCHARGE

## 2024-06-14 RX ORDER — HYDROCHLOROTHIAZIDE 25 MG
1 TABLET ORAL
Refills: 0 | DISCHARGE

## 2024-06-14 RX ORDER — OXYCODONE AND ACETAMINOPHEN 5; 325 MG/1; MG/1
1 TABLET ORAL EVERY 4 HOURS
Refills: 0 | Status: DISCONTINUED | OUTPATIENT
Start: 2024-06-14 | End: 2024-06-14

## 2024-06-14 RX ORDER — OXYCODONE AND ACETAMINOPHEN 5; 325 MG/1; MG/1
1 TABLET ORAL
Refills: 0 | DISCHARGE

## 2024-06-14 RX ORDER — ONDANSETRON 8 MG/1
4 TABLET, FILM COATED ORAL ONCE
Refills: 0 | Status: DISCONTINUED | OUTPATIENT
Start: 2024-06-14 | End: 2024-06-14

## 2024-06-14 RX ORDER — FINASTERIDE 5 MG/1
1 TABLET, FILM COATED ORAL
Refills: 0 | DISCHARGE

## 2024-06-14 RX ADMIN — SODIUM CHLORIDE 100 MILLILITER(S): 9 INJECTION, SOLUTION INTRAVENOUS at 09:00

## 2024-06-14 NOTE — BRIEF OPERATIVE NOTE - OPERATION/FINDINGS
sp TURBT and partial TURP of part of right lateral lobe. tumor completely resected    fu for TOV monday  an antibiotics through tri/sufla

## 2024-06-14 NOTE — ASU DISCHARGE PLAN (ADULT/PEDIATRIC) - ASU DC SPECIAL INSTRUCTIONSFT
You have a catheter which will stay in until your next appointment with Dr Hankins. Please take antibiotics which have been prescribed to your pharmacy. You may take tylenol for pain as needed.  Blood In urine or around the tip of the penis is expected. If the catheter stops draining or if you develop fevers or chills >100.4 not improving w tylenol you should visit emergency room and alert Dr Hankins.  Dr Hankins's office will call you for a follow up appointment.

## 2024-06-14 NOTE — ASU PATIENT PROFILE, ADULT - NSICDXPASTSURGICALHX_GEN_ALL_CORE_FT
PAST SURGICAL HISTORY:  H/O cataract extraction with bl iol's    History of knee replacement, total, left

## 2024-06-14 NOTE — ASU PATIENT PROFILE, ADULT - AS SC BRADEN FRICTION
Initiate Treatment: Apply 5FU to the arms BID for 4 weeks, break for 6-8 weeks and then BID for 4 weeks on the legs Plan: He will start treatment after the new year Detail Level: Zone (3) no apparent problem

## 2024-06-14 NOTE — ASU PATIENT PROFILE, ADULT - NSICDXPASTMEDICALHX_GEN_ALL_CORE_FT
PAST MEDICAL HISTORY:  Bilateral cataracts     BPH with urinary obstruction     Diabetes mellitus     HLD (hyperlipidemia)     HTN (hypertension)     Osteoarthritis

## 2024-06-14 NOTE — CHART NOTE - NSCHARTNOTEFT_GEN_A_CORE
PACU ANESTHESIA ADMISSION NOTE      Procedure: cysto, turbt, partial turp  Post op diagnosis:  bladder neck tumor    ____  Intubated  TV:______       Rate: ______      FiO2: ______    _x___  Patent Airway    _x___  Full return of protective reflexes    _x___  Full recovery from anesthesia / back to baseline status    Vitals:    see anesthesia record    Mental Status:  ____ Awake   _____ Alert   _x____ Drowsy   _____ Sedated    Nausea/Vomiting:  _x___  NO       ______Yes,   See Post - Op Orders         Pain Scale (0-10):  _____    Treatment: ____ None    __x__ See Post - Op/PCA Orders    Post - Operative Fluids:   ____ Oral   ___x_ See Post - Op Orders    Plan: Discharge:   __x__Home       _____Floor     _____Critical Care    _____  Other:_________________    Comments:  No anesthesia issues or complications noted.  Discharge when criteria met.

## 2024-06-14 NOTE — ASU PATIENT PROFILE, ADULT - FALL HARM RISK - HARM RISK INTERVENTIONS

## 2024-06-17 ENCOUNTER — APPOINTMENT (OUTPATIENT)
Dept: UROLOGY | Facility: CLINIC | Age: 84
End: 2024-06-17
Payer: MEDICARE

## 2024-06-17 VITALS
SYSTOLIC BLOOD PRESSURE: 148 MMHG | HEART RATE: 54 BPM | WEIGHT: 160 LBS | BODY MASS INDEX: 25.71 KG/M2 | OXYGEN SATURATION: 98 % | DIASTOLIC BLOOD PRESSURE: 61 MMHG | HEIGHT: 66 IN | RESPIRATION RATE: 16 BRPM | TEMPERATURE: 97.6 F

## 2024-06-17 DIAGNOSIS — D49.4 NEOPLASM OF UNSPECIFIED BEHAVIOR OF BLADDER: ICD-10-CM

## 2024-06-17 DIAGNOSIS — N40.1 BENIGN PROSTATIC HYPERPLASIA WITH LOWER URINARY TRACT SYMPMS: ICD-10-CM

## 2024-06-17 DIAGNOSIS — R33.9 RETENTION OF URINE, UNSPECIFIED: ICD-10-CM

## 2024-06-17 DIAGNOSIS — N13.8 BENIGN PROSTATIC HYPERPLASIA WITH LOWER URINARY TRACT SYMPMS: ICD-10-CM

## 2024-06-17 DIAGNOSIS — N20.0 CALCULUS OF KIDNEY: ICD-10-CM

## 2024-06-17 PROBLEM — H26.9 UNSPECIFIED CATARACT: Chronic | Status: ACTIVE | Noted: 2024-06-14

## 2024-06-17 PROCEDURE — 99214 OFFICE O/P EST MOD 30 MIN: CPT | Mod: 24

## 2024-06-17 PROCEDURE — G2211 COMPLEX E/M VISIT ADD ON: CPT

## 2024-06-17 NOTE — HISTORY OF PRESENT ILLNESS
[FreeTextEntry1] : Robert Reyes is an 83-year-old male hx DM who presents with history of UTI, DM, BPH/LUTS and prior incomplete bladder emptying on Flomax. Recent gross hematuria with CT evidence of cystitis however culture equivocal for UTI. Nonobstructing nephrolithiasis. 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 sp TURBT and partial TURP of part of right lateral lobe. tumor completely resected  Now s/p TURBT and presents to office for Conrad Catheter removal.  Patient reports feeling well.  No fevers.  Completed antibiotics this morning.  Clear yellow urine in catheter bag.   Prior: KUB 03/14/2024 --agree with findings left renal stone seen IMPRESSION: 3 mm calculus overlies the lower pole of the left kidney. Linear calcification overlies the right renal pelvis which is vascular in nature. The psoas margins are unremarkable. There is no organomegaly. Degenerative changes of the lumbar sacral spine.  UA 03/11/2024 - glucosuria, proteinuria, ketone treace UCx - normal urogenital hayley UCyt - NEGATIVE FOR HIGH GRADE UROTHELIAL CARCINOMA. Rare benign urothelial and squamous cells. Some inflammatory cells and red blood cells.  CT AP w/ IV Cont 02/23/2024 - images independently reviewed by me - Agree with findings, diffuse bladder wall thickening. No hydronephrosis bl. Non obstructing LLP stone 5 mm, , appears visible on  imaging. IMPRESSION: Diffuse urinary bladder wall thickening, concerning for cystitis. Correlate with urinalysis. Indeterminate left adrenal gland nodule. Recommend outpatient adrenal protocol CT for further evaluation. (ADRENALS: Indeterminate left adrenal gland nodule measuring 1.3 cm (series 4 image 42). Normal right adrenal gland. 06. KIDNEYS: Symmetric enhancement. No hydronephrosis. Nonobstructing subcentimeter left renal lower pole stone. Left renal cyst. Right renal hypodensity too small to further characterize.)  Labs 02/23/2024 Cr 1.3 GFR 55 stable from prior UA - significant hematuria, pyuria and proteinuria. UCx - 49,00 coag neg staph  Uroflow patient is visualized and PVR done today shows Valsalva voiding with intermittency. Low flow. Patient voided volume approximately 120 mL with a PVR of 13 mL.  Urine testing from October 2022 Culture no growth Microscopic add-on 0 RBCs and 0 WBCs UA negative for blood, nitrites, and leukocytes.  Bladder sono images 2020 demonstrating 65 g prostate with post with residual 3 mL. The lateral thickening.  Nov 2019 patient began to experience worsening lower urinary tract symptoms and dysuria as an emergency room where he was found to have urinary tract infection. He was prescribed antibiotics and reports that his symptoms resolved. He is to started Flomax and reports significant improvement in his lower urinary tract symptoms. Denies gross hematuria, dysuria or associated symptoms. Denies pelvic pain. Udip 2/3/20 shows glucosuria without microheme or pyurai  Denies  PMH including previous kidney stones, recurrent UTIs. Family History: No  malignancies Social history: , retired, states non-smoker, former ,  Old records reviewed PSA 3.11 9/2019 UCx klebsiella prescott sensitive >100,000 cfu 11/2019 UA neg for 3 or greater RBCs

## 2024-06-17 NOTE — ASSESSMENT
[FreeTextEntry1] : Robert Reyes is an 83-year-old male hx DM who presents with history of UTI, DM, BPH/LUTS and prior incomplete bladder emptying on Flomax. Recent gross hematuria with CT evidence of cystitis however culture equivocal for UTI. Nonobstructing nephrolithiasis. 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 sp TURBT and partial TURP of part of right lateral lobe. tumor completely resected  Catheter removed today without difficulty. Did have bleeding from meatus.   Instructed patient to increase water intake.  RTO if unable to void.  ER if office is closed and unable to void.   Follow up 3 months for cystoscopy as we are anticipating bladder cancer.   Will call patient for pathology  Continue Flomax for chronic BPH and stable lower urinary tract symptoms  Obsevation for left chronic nonobstructing nephrolithiasis.  Consider shockwave lithotripsy in the future if stone grows

## 2024-06-19 LAB — SURGICAL PATHOLOGY STUDY: SIGNIFICANT CHANGE UP

## 2024-06-20 DIAGNOSIS — E11.9 TYPE 2 DIABETES MELLITUS WITHOUT COMPLICATIONS: ICD-10-CM

## 2024-06-20 DIAGNOSIS — M19.90 UNSPECIFIED OSTEOARTHRITIS, UNSPECIFIED SITE: ICD-10-CM

## 2024-06-20 DIAGNOSIS — Z79.84 LONG TERM (CURRENT) USE OF ORAL HYPOGLYCEMIC DRUGS: ICD-10-CM

## 2024-06-20 DIAGNOSIS — N40.1 BENIGN PROSTATIC HYPERPLASIA WITH LOWER URINARY TRACT SYMPTOMS: ICD-10-CM

## 2024-06-20 DIAGNOSIS — R31.0 GROSS HEMATURIA: ICD-10-CM

## 2024-06-20 DIAGNOSIS — N30.81 OTHER CYSTITIS WITH HEMATURIA: ICD-10-CM

## 2024-06-20 DIAGNOSIS — D09.0 CARCINOMA IN SITU OF BLADDER: ICD-10-CM

## 2024-06-20 DIAGNOSIS — Z79.4 LONG TERM (CURRENT) USE OF INSULIN: ICD-10-CM

## 2024-06-20 DIAGNOSIS — I10 ESSENTIAL (PRIMARY) HYPERTENSION: ICD-10-CM

## 2024-06-20 DIAGNOSIS — R33.8 OTHER RETENTION OF URINE: ICD-10-CM

## 2024-07-22 NOTE — ASU PATIENT PROFILE, ADULT - PATIENT'S HEIGHT AND WEIGHT RECORDED IN THE VITAL SIGNS FLOWSHEET
Keep Blood pressure log with goal of -140 and DBP  60-90, contact office if multiple readings out of range    Limit salt to < 3 mg daily per AHA guidelines    yes

## 2024-08-02 ENCOUNTER — EMERGENCY (EMERGENCY)
Facility: HOSPITAL | Age: 84
LOS: 0 days | Discharge: HOME | End: 2024-08-03
Attending: EMERGENCY MEDICINE
Payer: MEDICARE

## 2024-08-02 VITALS
WEIGHT: 160.06 LBS | HEIGHT: 66 IN | TEMPERATURE: 98 F | RESPIRATION RATE: 16 BRPM | OXYGEN SATURATION: 97 % | SYSTOLIC BLOOD PRESSURE: 177 MMHG | DIASTOLIC BLOOD PRESSURE: 74 MMHG | HEART RATE: 42 BPM

## 2024-08-02 DIAGNOSIS — N39.0 URINARY TRACT INFECTION, SITE NOT SPECIFIED: ICD-10-CM

## 2024-08-02 DIAGNOSIS — M54.50 LOW BACK PAIN, UNSPECIFIED: ICD-10-CM

## 2024-08-02 DIAGNOSIS — Z96.652 PRESENCE OF LEFT ARTIFICIAL KNEE JOINT: Chronic | ICD-10-CM

## 2024-08-02 DIAGNOSIS — Z98.49 CATARACT EXTRACTION STATUS, UNSPECIFIED EYE: Chronic | ICD-10-CM

## 2024-08-02 PROCEDURE — 85025 COMPLETE CBC W/AUTO DIFF WBC: CPT

## 2024-08-02 PROCEDURE — 20552 NJX 1/MLT TRIGGER POINT 1/2: CPT

## 2024-08-02 PROCEDURE — 87086 URINE CULTURE/COLONY COUNT: CPT

## 2024-08-02 PROCEDURE — 99285 EMERGENCY DEPT VISIT HI MDM: CPT | Mod: 25

## 2024-08-02 PROCEDURE — 96374 THER/PROPH/DIAG INJ IV PUSH: CPT | Mod: XU

## 2024-08-02 PROCEDURE — 87077 CULTURE AEROBIC IDENTIFY: CPT

## 2024-08-02 PROCEDURE — 36415 COLL VENOUS BLD VENIPUNCTURE: CPT

## 2024-08-02 PROCEDURE — 80053 COMPREHEN METABOLIC PANEL: CPT

## 2024-08-02 PROCEDURE — 93005 ELECTROCARDIOGRAM TRACING: CPT

## 2024-08-02 PROCEDURE — 93010 ELECTROCARDIOGRAM REPORT: CPT

## 2024-08-02 PROCEDURE — 81001 URINALYSIS AUTO W/SCOPE: CPT

## 2024-08-02 PROCEDURE — 74177 CT ABD & PELVIS W/CONTRAST: CPT | Mod: MC

## 2024-08-02 PROCEDURE — 83690 ASSAY OF LIPASE: CPT

## 2024-08-02 PROCEDURE — 20552 NJX 1/MLT TRIGGER POINT 1/2: CPT | Mod: RT

## 2024-08-02 NOTE — ED ADULT TRIAGE NOTE - CHIEF COMPLAINT QUOTE
I came down with a pain on my back for days (right flank) - patient   Patient denies dysuria, denies dizzy denies chest pain, denies SOB, EKG compared to prior EKG's by attending

## 2024-08-03 VITALS
DIASTOLIC BLOOD PRESSURE: 76 MMHG | HEART RATE: 40 BPM | RESPIRATION RATE: 16 BRPM | OXYGEN SATURATION: 100 % | SYSTOLIC BLOOD PRESSURE: 170 MMHG

## 2024-08-03 LAB
ALBUMIN SERPL ELPH-MCNC: 3.9 G/DL — SIGNIFICANT CHANGE UP (ref 3.5–5.2)
ALP SERPL-CCNC: 57 U/L — SIGNIFICANT CHANGE UP (ref 30–115)
ALT FLD-CCNC: 14 U/L — SIGNIFICANT CHANGE UP (ref 0–41)
ANION GAP SERPL CALC-SCNC: 11 MMOL/L — SIGNIFICANT CHANGE UP (ref 7–14)
APPEARANCE UR: CLEAR — SIGNIFICANT CHANGE UP
AST SERPL-CCNC: 14 U/L — SIGNIFICANT CHANGE UP (ref 0–41)
BACTERIA # UR AUTO: NEGATIVE /HPF — SIGNIFICANT CHANGE UP
BASOPHILS # BLD AUTO: 0.02 K/UL — SIGNIFICANT CHANGE UP (ref 0–0.2)
BASOPHILS NFR BLD AUTO: 0.2 % — SIGNIFICANT CHANGE UP (ref 0–1)
BILIRUB SERPL-MCNC: 0.3 MG/DL — SIGNIFICANT CHANGE UP (ref 0.2–1.2)
BILIRUB UR-MCNC: NEGATIVE — SIGNIFICANT CHANGE UP
BUN SERPL-MCNC: 42 MG/DL — HIGH (ref 10–20)
CALCIUM SERPL-MCNC: 8.6 MG/DL — SIGNIFICANT CHANGE UP (ref 8.4–10.5)
CAST: 2 /LPF — SIGNIFICANT CHANGE UP (ref 0–4)
CHLORIDE SERPL-SCNC: 109 MMOL/L — SIGNIFICANT CHANGE UP (ref 98–110)
CO2 SERPL-SCNC: 20 MMOL/L — SIGNIFICANT CHANGE UP (ref 17–32)
COLOR SPEC: YELLOW — SIGNIFICANT CHANGE UP
CREAT SERPL-MCNC: 1.7 MG/DL — HIGH (ref 0.7–1.5)
DIFF PNL FLD: ABNORMAL
EGFR: 40 ML/MIN/1.73M2 — LOW
EOSINOPHIL # BLD AUTO: 0.13 K/UL — SIGNIFICANT CHANGE UP (ref 0–0.7)
EOSINOPHIL NFR BLD AUTO: 1.5 % — SIGNIFICANT CHANGE UP (ref 0–8)
GLUCOSE SERPL-MCNC: 271 MG/DL — HIGH (ref 70–99)
GLUCOSE UR QL: >=1000 MG/DL
HCT VFR BLD CALC: 36 % — LOW (ref 42–52)
HGB BLD-MCNC: 11.8 G/DL — LOW (ref 14–18)
IMM GRANULOCYTES NFR BLD AUTO: 0.4 % — HIGH (ref 0.1–0.3)
KETONES UR-MCNC: NEGATIVE MG/DL — SIGNIFICANT CHANGE UP
LEUKOCYTE ESTERASE UR-ACNC: ABNORMAL
LIDOCAIN IGE QN: 46 U/L — SIGNIFICANT CHANGE UP (ref 7–60)
LYMPHOCYTES # BLD AUTO: 1.93 K/UL — SIGNIFICANT CHANGE UP (ref 1.2–3.4)
LYMPHOCYTES # BLD AUTO: 22.9 % — SIGNIFICANT CHANGE UP (ref 20.5–51.1)
MCHC RBC-ENTMCNC: 28 PG — SIGNIFICANT CHANGE UP (ref 27–31)
MCHC RBC-ENTMCNC: 32.8 G/DL — SIGNIFICANT CHANGE UP (ref 32–37)
MCV RBC AUTO: 85.3 FL — SIGNIFICANT CHANGE UP (ref 80–94)
MONOCYTES # BLD AUTO: 0.71 K/UL — HIGH (ref 0.1–0.6)
MONOCYTES NFR BLD AUTO: 8.4 % — SIGNIFICANT CHANGE UP (ref 1.7–9.3)
NEUTROPHILS # BLD AUTO: 5.59 K/UL — SIGNIFICANT CHANGE UP (ref 1.4–6.5)
NEUTROPHILS NFR BLD AUTO: 66.6 % — SIGNIFICANT CHANGE UP (ref 42.2–75.2)
NITRITE UR-MCNC: NEGATIVE — SIGNIFICANT CHANGE UP
NRBC # BLD: 0 /100 WBCS — SIGNIFICANT CHANGE UP (ref 0–0)
PH UR: 6 — SIGNIFICANT CHANGE UP (ref 5–8)
PLATELET # BLD AUTO: 224 K/UL — SIGNIFICANT CHANGE UP (ref 130–400)
PMV BLD: 11.7 FL — HIGH (ref 7.4–10.4)
POTASSIUM SERPL-MCNC: 4.1 MMOL/L — SIGNIFICANT CHANGE UP (ref 3.5–5)
POTASSIUM SERPL-SCNC: 4.1 MMOL/L — SIGNIFICANT CHANGE UP (ref 3.5–5)
PROT SERPL-MCNC: 6.4 G/DL — SIGNIFICANT CHANGE UP (ref 6–8)
PROT UR-MCNC: 300 MG/DL
RBC # BLD: 4.22 M/UL — LOW (ref 4.7–6.1)
RBC # FLD: 13.2 % — SIGNIFICANT CHANGE UP (ref 11.5–14.5)
RBC CASTS # UR COMP ASSIST: 3 /HPF — SIGNIFICANT CHANGE UP (ref 0–4)
SODIUM SERPL-SCNC: 140 MMOL/L — SIGNIFICANT CHANGE UP (ref 135–146)
SP GR SPEC: 1.02 — SIGNIFICANT CHANGE UP (ref 1–1.03)
SQUAMOUS # UR AUTO: 1 /HPF — SIGNIFICANT CHANGE UP (ref 0–5)
URATE CRY FLD QL MICRO: PRESENT
UROBILINOGEN FLD QL: 0.2 MG/DL — SIGNIFICANT CHANGE UP (ref 0.2–1)
WBC # BLD: 8.41 K/UL — SIGNIFICANT CHANGE UP (ref 4.8–10.8)
WBC # FLD AUTO: 8.41 K/UL — SIGNIFICANT CHANGE UP (ref 4.8–10.8)
WBC UR QL: 59 /HPF — HIGH (ref 0–5)

## 2024-08-03 PROCEDURE — 74177 CT ABD & PELVIS W/CONTRAST: CPT | Mod: 26,MC

## 2024-08-03 RX ORDER — CEFPODOXIME PROXETIL 50 MG/5 ML
1 SUSPENSION, RECONSTITUTED, ORAL (ML) ORAL
Qty: 20 | Refills: 0
Start: 2024-08-03 | End: 2024-08-12

## 2024-08-03 RX ORDER — CEFTRIAXONE SODIUM 500 MG
1000 VIAL (EA) INJECTION ONCE
Refills: 0 | Status: COMPLETED | OUTPATIENT
Start: 2024-08-03 | End: 2024-08-03

## 2024-08-03 RX ADMIN — Medication 100 MILLIGRAM(S): at 03:00

## 2024-08-03 NOTE — ED ADULT NURSE NOTE - OBJECTIVE STATEMENT
84y male c/o right flank pain started 2 days ago. pt 84y male c/o right flank pain started 2 days ago. pt alert and awake speaking in full sentences. pt denies n/v/d/fever/chills/SOB/chest pain.

## 2024-08-03 NOTE — ED ADULT NURSE NOTE - NSFALLUNIVINTERV_ED_ALL_ED
Bed/Stretcher in lowest position, wheels locked, appropriate side rails in place/Call bell, personal items and telephone in reach/Instruct patient to call for assistance before getting out of bed/chair/stretcher/Non-slip footwear applied when patient is off stretcher/[place] to call system/Physically safe environment - no spills, clutter or unnecessary equipment/Purposeful proactive rounding/Room/bathroom lighting operational, light cord in reach

## 2024-08-03 NOTE — ED PROVIDER NOTE - PHYSICAL EXAMINATION
CONSTITUTIONAL: Well-developed; well-nourished; in no acute distress, nontoxic appearing  SKIN: skin exam is warm and dry,  HEAD: Normocephalic; atraumatic.  EYES: PERRL, 3 mm bilateral, no nystagmus, EOM intact; conjunctiva and sclera clear.  ENT: MMM, no nasal congestion  NECK: Supple; non tender.+ full passive ROM in all directions. No JVD  CARD: S1, S2 normal, no murmur  RESP: No wheezes, rales or rhonchi. Good air movement bilaterally  ABD: soft; non-distended; non-tender. No Rebound, No guarding  BACK: + ttp in the right lower paraspinal back  EXT: Normal ROM. No cyanosis or edema. Dp Pulses intact.   NEURO: awake, alert, following commands, oriented, GCS 15, normal motor sensation, cerebellar and gait exams  PSYCH: Cooperative, appropriate.

## 2024-08-03 NOTE — ED PROVIDER NOTE - OBJECTIVE STATEMENT
Patient with 3 days of right-sided low back pain.  Denies any trauma.  Denies fever, nausea, vomiting, lightheadedness or LOC.  Reports right leg heaviness.

## 2024-08-03 NOTE — ED ADULT NURSE NOTE - NS ED NURSE RECORD ANOTHER HT AND WT
[Normal Appearance] : normal appearance [General Appearance - Well Developed] : well developed [General Appearance - Well Nourished] : well nourished [Normal Conjunctiva] : the conjunctiva exhibited no abnormalities [Neck Appearance] : the appearance of the neck was normal [III] : III [Thyroid Diffuse Enlargement] : the thyroid was not enlarged [Heart Rate And Rhythm] : heart rate and rhythm were normal [Jugular Venous Distention Increased] : there was no jugular-venous distention [Heart Sounds] : normal S1 and S2 [Edema] : no peripheral edema present Yes [Arterial Pulses Normal] : the arterial pulses were normal [Respiration, Rhythm And Depth] : normal respiratory rhythm and effort [Exaggerated Use Of Accessory Muscles For Inspiration] : no accessory muscle use [Abdomen Soft] : soft [Abdomen Tenderness] : non-tender [Auscultation Breath Sounds / Voice Sounds] : lungs were clear to auscultation bilaterally [Abnormal Walk] : normal gait [Abdomen Mass (___ Cm)] : no abdominal mass palpated [Nail Clubbing] : no clubbing of the fingernails [Skin Turgor] : normal skin turgor [Skin Color & Pigmentation] : normal skin color and pigmentation [Cyanosis, Localized] : no localized cyanosis [No Focal Deficits] : no focal deficits [] : no rash [Impaired Insight] : insight and judgment were intact [Oriented To Time, Place, And Person] : oriented to person, place, and time [Affect] : the affect was normal

## 2024-08-03 NOTE — ED PROVIDER NOTE - CARE PROVIDER_API CALL
Yasmany Reeder  Internal Medicine  [address] [place] [address]  Phone: [phone]  Fax: [phone]  Follow Up Time: 7-10 Days

## 2024-08-03 NOTE — ED PROVIDER NOTE - PATIENT PORTAL LINK FT
You can access the FollowMyHealth Patient Portal offered by [facility] by registering at the following website: [email or web address]. By joining [facility]’s FollowMyHealth portal, you will also be able to view your health information using other applications (apps) compatible with our system.

## 2024-08-05 LAB
CULTURE RESULTS: ABNORMAL
SPECIMEN SOURCE: SIGNIFICANT CHANGE UP

## 2024-08-20 ENCOUNTER — NON-APPOINTMENT (OUTPATIENT)
Age: 84
End: 2024-08-20

## 2024-08-21 ENCOUNTER — APPOINTMENT (OUTPATIENT)
Dept: ELECTROPHYSIOLOGY | Facility: CLINIC | Age: 84
End: 2024-08-21
Payer: MEDICARE

## 2024-08-21 ENCOUNTER — APPOINTMENT (OUTPATIENT)
Dept: CARDIOLOGY | Facility: CLINIC | Age: 84
End: 2024-08-21
Payer: MEDICARE

## 2024-08-21 VITALS
WEIGHT: 160 LBS | HEART RATE: 53 BPM | DIASTOLIC BLOOD PRESSURE: 72 MMHG | HEIGHT: 66 IN | OXYGEN SATURATION: 97 % | BODY MASS INDEX: 25.71 KG/M2 | SYSTOLIC BLOOD PRESSURE: 138 MMHG

## 2024-08-21 VITALS
WEIGHT: 162 LBS | DIASTOLIC BLOOD PRESSURE: 82 MMHG | HEIGHT: 66 IN | SYSTOLIC BLOOD PRESSURE: 162 MMHG | HEART RATE: 40 BPM | TEMPERATURE: 97.1 F | BODY MASS INDEX: 26.03 KG/M2

## 2024-08-21 DIAGNOSIS — R00.1 BRADYCARDIA, UNSPECIFIED: ICD-10-CM

## 2024-08-21 DIAGNOSIS — R60.0 LOCALIZED EDEMA: ICD-10-CM

## 2024-08-21 DIAGNOSIS — I10 ESSENTIAL (PRIMARY) HYPERTENSION: ICD-10-CM

## 2024-08-21 PROCEDURE — 99204 OFFICE O/P NEW MOD 45 MIN: CPT

## 2024-08-21 PROCEDURE — G2211 COMPLEX E/M VISIT ADD ON: CPT

## 2024-08-21 PROCEDURE — 93000 ELECTROCARDIOGRAM COMPLETE: CPT

## 2024-08-21 PROCEDURE — 99205 OFFICE O/P NEW HI 60 MIN: CPT

## 2024-08-21 RX ORDER — OXYCODONE HYDROCHLORIDE AND ACETAMINOPHEN 7.5; 325 MG/1; MG/1
7.5-325 TABLET ORAL
Refills: 0 | Status: ACTIVE | COMMUNITY

## 2024-08-21 NOTE — PHYSICAL EXAM
[Well Developed] : well developed [Well Nourished] : well nourished [No Acute Distress] : no acute distress [Normal Conjunctiva] : normal conjunctiva [Normal S1, S2] : normal S1, S2 [No Murmur] : no murmur [Clear Lung Fields] : clear lung fields [Good Air Entry] : good air entry [No Respiratory Distress] : no respiratory distress  [Soft] : abdomen soft [Normal Gait] : normal gait [Moves all extremities] : moves all extremities [Normal Speech] : normal speech [Alert and Oriented] : alert and oriented [de-identified] : bradycardic

## 2024-08-21 NOTE — DISCUSSION/SUMMARY
[EKG obtained to assist in diagnosis and management of assessed problem(s)] : EKG obtained to assist in diagnosis and management of assessed problem(s) [FreeTextEntry1] : Will plan PPM pending echo  We discussed the risks/benefits/alternatives, nature of procedure, and follow up care after device is implanted. We also discussed remote monitoring in details. We discussed the risks including but not limited to bleeding, hematoma, injury to vessels and heart, perforation, tamponade, pneumothorax, infection, lead dislodgment, device malfunction, and rare risks of stroke/heart attack/death. Patient expressed understanding of the discussion. I answered all questions in detail and patient was agreeable to device implant.    I have recommended an event monitor to further evaluate his symptoms to determine if the symptoms may be related to a cardiac arrhythmia.  I educated the patient about the patient symptom activator feature and I have asked him to activate the event monitor whenever he has symptoms.

## 2024-08-21 NOTE — CARDIOLOGY SUMMARY
[de-identified] : 8/21/2024 Sinus bradycardia (HR 40 bpm) [de-identified] : never per pt [de-identified] : never

## 2024-08-21 NOTE — REVIEW OF SYSTEMS
[Lower Ext Edema] : lower extremity edema [Myalgia] : myalgia [Dizziness] : dizziness [Negative] : Psychiatric [SOB] : no shortness of breath [Dyspnea on exertion] : not dyspnea during exertion [Chest Discomfort] : no chest discomfort [Leg Claudication] : no intermittent leg claudication [Palpitations] : no palpitations [Syncope] : no syncope

## 2024-08-21 NOTE — ASSESSMENT
[FreeTextEntry1] : # Sinus nelson/dizziness/dyspnea - Check TFTs - Event monitor for 2 weeks - Avoid AVN blocking agents - Will plan for PPM for next week pending echo  # HTN - BP well controlled - Cont Amlodipine 10 mg daily, Enalapril 20 mg daily - 2g Na diet enforced  I have also advised the patient to go to the nearest emergency room if he experiences any chest pain, dyspnea, syncope, or has any other compelling symptoms.  Follow up after PPM implant for wound check visit with ACP  I have spent 60 minutes of time on the encounter, which excludes separately reported services.

## 2024-08-21 NOTE — ASSESSMENT
[FreeTextEntry1] : 84-year-old male PMHx: never a smoker, DM, HTN, HLD, BPH, recurrent UTIs, TURBT procedure with uro, bradycardia, arthritis  Bradycardia  Plan: F/U with EP  BAILEY/PVR Venous Duplex  F/U after testing

## 2024-08-21 NOTE — HISTORY OF PRESENT ILLNESS
[FreeTextEntry1] : 84-year-old male PMHx: never a smoker, DM, HTN, HLD, BPH, recurrent UTIs, TURBT procedure with uro, bradycardia, arthritis  Patient presents to establish cardiac care. Pt recently in ER twice in the last few weeks for left flank pain and left arm pain. Pt previously seen by other cardiologist unsure of name about ~1 month ago for clearance for urology procedure (TURBT). While pt was being treated in ER for back and arm pain pt was bradycardic, advised to see a cardiologist. Denies CP, SOB, palpitations, syncope. Pt reports occasional lightheadedness and some LE swelling.   === Data reviewed today ===  vital signs  8/3/24: GFR 40

## 2024-08-21 NOTE — HISTORY OF PRESENT ILLNESS
[FreeTextEntry1] : Cardio: Dr. Moy  83 yo M with history of HTN, HL, DM, BPH, recurrent UTIs, TURBT, referred for evaluation of bradycardia. Pt recently in ER twice in the last few weeks for left flank pain and left arm pain. Pt previously seen by other cardiologist unsure of name about ~1 month ago for clearance for urology procedure (TURBT). While pt was being treated in ER for back and arm pain pt was bradycardic, advised to see a cardiologist. Denies CP, palpitations, syncope. Pt reports occasional lightheadedness and some dyspnea on exertion.

## 2024-08-21 NOTE — END OF VISIT
[Time Spent: ___ minutes] : I have spent [unfilled] minutes of time on the encounter which excludes teaching and separately reported services. No

## 2024-08-26 ENCOUNTER — INPATIENT (INPATIENT)
Facility: HOSPITAL | Age: 84
LOS: 2 days | Discharge: HOME | DRG: 229 | End: 2024-08-29
Attending: STUDENT IN AN ORGANIZED HEALTH CARE EDUCATION/TRAINING PROGRAM | Admitting: STUDENT IN AN ORGANIZED HEALTH CARE EDUCATION/TRAINING PROGRAM
Payer: MEDICARE

## 2024-08-26 ENCOUNTER — RESULT REVIEW (OUTPATIENT)
Age: 84
End: 2024-08-26

## 2024-08-26 VITALS
OXYGEN SATURATION: 97 % | WEIGHT: 160.06 LBS | RESPIRATION RATE: 18 BRPM | TEMPERATURE: 98 F | HEIGHT: 66 IN | DIASTOLIC BLOOD PRESSURE: 78 MMHG | SYSTOLIC BLOOD PRESSURE: 176 MMHG | HEART RATE: 45 BPM

## 2024-08-26 DIAGNOSIS — Z98.49 CATARACT EXTRACTION STATUS, UNSPECIFIED EYE: Chronic | ICD-10-CM

## 2024-08-26 DIAGNOSIS — Z96.652 PRESENCE OF LEFT ARTIFICIAL KNEE JOINT: Chronic | ICD-10-CM

## 2024-08-26 DIAGNOSIS — I49.9 CARDIAC ARRHYTHMIA, UNSPECIFIED: ICD-10-CM

## 2024-08-26 LAB
ALBUMIN SERPL ELPH-MCNC: 3.9 G/DL — SIGNIFICANT CHANGE UP (ref 3.5–5.2)
ALP SERPL-CCNC: 67 U/L — SIGNIFICANT CHANGE UP (ref 30–115)
ALT FLD-CCNC: 16 U/L — SIGNIFICANT CHANGE UP (ref 0–41)
ANION GAP SERPL CALC-SCNC: 11 MMOL/L — SIGNIFICANT CHANGE UP (ref 7–14)
APTT BLD: 28.8 SEC — SIGNIFICANT CHANGE UP (ref 27–39.2)
AST SERPL-CCNC: 14 U/L — SIGNIFICANT CHANGE UP (ref 0–41)
BASOPHILS # BLD AUTO: 0.06 K/UL — SIGNIFICANT CHANGE UP (ref 0–0.2)
BASOPHILS NFR BLD AUTO: 0.6 % — SIGNIFICANT CHANGE UP (ref 0–1)
BILIRUB SERPL-MCNC: 0.5 MG/DL — SIGNIFICANT CHANGE UP (ref 0.2–1.2)
BUN SERPL-MCNC: 21 MG/DL — HIGH (ref 10–20)
CALCIUM SERPL-MCNC: 9.2 MG/DL — SIGNIFICANT CHANGE UP (ref 8.4–10.5)
CHLORIDE SERPL-SCNC: 108 MMOL/L — SIGNIFICANT CHANGE UP (ref 98–110)
CO2 SERPL-SCNC: 23 MMOL/L — SIGNIFICANT CHANGE UP (ref 17–32)
CREAT SERPL-MCNC: 1.3 MG/DL — SIGNIFICANT CHANGE UP (ref 0.7–1.5)
EGFR: 55 ML/MIN/1.73M2 — LOW
EOSINOPHIL # BLD AUTO: 0.33 K/UL — SIGNIFICANT CHANGE UP (ref 0–0.7)
EOSINOPHIL NFR BLD AUTO: 3.5 % — SIGNIFICANT CHANGE UP (ref 0–8)
GLUCOSE BLDC GLUCOMTR-MCNC: 134 MG/DL — HIGH (ref 70–99)
GLUCOSE BLDC GLUCOMTR-MCNC: 157 MG/DL — HIGH (ref 70–99)
GLUCOSE SERPL-MCNC: 197 MG/DL — HIGH (ref 70–99)
HCT VFR BLD CALC: 38.2 % — LOW (ref 42–52)
HGB BLD-MCNC: 12.7 G/DL — LOW (ref 14–18)
IMM GRANULOCYTES NFR BLD AUTO: 0.4 % — HIGH (ref 0.1–0.3)
INR BLD: 1 RATIO — SIGNIFICANT CHANGE UP (ref 0.65–1.3)
LYMPHOCYTES # BLD AUTO: 1.46 K/UL — SIGNIFICANT CHANGE UP (ref 1.2–3.4)
LYMPHOCYTES # BLD AUTO: 15.6 % — LOW (ref 20.5–51.1)
MAGNESIUM SERPL-MCNC: 2 MG/DL — SIGNIFICANT CHANGE UP (ref 1.8–2.4)
MCHC RBC-ENTMCNC: 28 PG — SIGNIFICANT CHANGE UP (ref 27–31)
MCHC RBC-ENTMCNC: 33.2 G/DL — SIGNIFICANT CHANGE UP (ref 32–37)
MCV RBC AUTO: 84.3 FL — SIGNIFICANT CHANGE UP (ref 80–94)
MONOCYTES # BLD AUTO: 0.78 K/UL — HIGH (ref 0.1–0.6)
MONOCYTES NFR BLD AUTO: 8.3 % — SIGNIFICANT CHANGE UP (ref 1.7–9.3)
NEUTROPHILS # BLD AUTO: 6.71 K/UL — HIGH (ref 1.4–6.5)
NEUTROPHILS NFR BLD AUTO: 71.6 % — SIGNIFICANT CHANGE UP (ref 42.2–75.2)
NRBC # BLD: 0 /100 WBCS — SIGNIFICANT CHANGE UP (ref 0–0)
PLATELET # BLD AUTO: 217 K/UL — SIGNIFICANT CHANGE UP (ref 130–400)
PMV BLD: 11.3 FL — HIGH (ref 7.4–10.4)
POTASSIUM SERPL-MCNC: 4.4 MMOL/L — SIGNIFICANT CHANGE UP (ref 3.5–5)
POTASSIUM SERPL-SCNC: 4.4 MMOL/L — SIGNIFICANT CHANGE UP (ref 3.5–5)
PROT SERPL-MCNC: 6.4 G/DL — SIGNIFICANT CHANGE UP (ref 6–8)
PROTHROM AB SERPL-ACNC: 11.4 SEC — SIGNIFICANT CHANGE UP (ref 9.95–12.87)
RBC # BLD: 4.53 M/UL — LOW (ref 4.7–6.1)
RBC # FLD: 13.3 % — SIGNIFICANT CHANGE UP (ref 11.5–14.5)
SODIUM SERPL-SCNC: 142 MMOL/L — SIGNIFICANT CHANGE UP (ref 135–146)
TROPONIN T, HIGH SENSITIVITY RESULT: 40 NG/L — HIGH (ref 6–21)
WBC # BLD: 9.38 K/UL — SIGNIFICANT CHANGE UP (ref 4.8–10.8)
WBC # FLD AUTO: 9.38 K/UL — SIGNIFICANT CHANGE UP (ref 4.8–10.8)

## 2024-08-26 PROCEDURE — 93005 ELECTROCARDIOGRAM TRACING: CPT

## 2024-08-26 PROCEDURE — 83735 ASSAY OF MAGNESIUM: CPT

## 2024-08-26 PROCEDURE — 93306 TTE W/DOPPLER COMPLETE: CPT | Mod: 26

## 2024-08-26 PROCEDURE — 84484 ASSAY OF TROPONIN QUANT: CPT

## 2024-08-26 PROCEDURE — C1786: CPT

## 2024-08-26 PROCEDURE — C1894: CPT

## 2024-08-26 PROCEDURE — 83036 HEMOGLOBIN GLYCOSYLATED A1C: CPT

## 2024-08-26 PROCEDURE — 80048 BASIC METABOLIC PNL TOTAL CA: CPT

## 2024-08-26 PROCEDURE — 86901 BLOOD TYPING SEROLOGIC RH(D): CPT

## 2024-08-26 PROCEDURE — 93286 PERI-PX EVAL PM/LDLS PM IP: CPT

## 2024-08-26 PROCEDURE — 84439 ASSAY OF FREE THYROXINE: CPT

## 2024-08-26 PROCEDURE — 84244 ASSAY OF RENIN: CPT

## 2024-08-26 PROCEDURE — 93010 ELECTROCARDIOGRAM REPORT: CPT

## 2024-08-26 PROCEDURE — 80061 LIPID PANEL: CPT

## 2024-08-26 PROCEDURE — 99223 1ST HOSP IP/OBS HIGH 75: CPT

## 2024-08-26 PROCEDURE — 93307 TTE W/O DOPPLER COMPLETE: CPT

## 2024-08-26 PROCEDURE — 97162 PT EVAL MOD COMPLEX 30 MIN: CPT | Mod: GP

## 2024-08-26 PROCEDURE — 85730 THROMBOPLASTIN TIME PARTIAL: CPT

## 2024-08-26 PROCEDURE — 0795T TCAT INS 2CHMBR LDLS PM CMPL: CPT | Mod: Q0

## 2024-08-26 PROCEDURE — 93975 VASCULAR STUDY: CPT

## 2024-08-26 PROCEDURE — 85610 PROTHROMBIN TIME: CPT

## 2024-08-26 PROCEDURE — 86850 RBC ANTIBODY SCREEN: CPT

## 2024-08-26 PROCEDURE — C1605: CPT

## 2024-08-26 PROCEDURE — C1887: CPT

## 2024-08-26 PROCEDURE — 93010 ELECTROCARDIOGRAM REPORT: CPT | Mod: 77

## 2024-08-26 PROCEDURE — 71046 X-RAY EXAM CHEST 2 VIEWS: CPT

## 2024-08-26 PROCEDURE — 85027 COMPLETE CBC AUTOMATED: CPT

## 2024-08-26 PROCEDURE — 84481 FREE ASSAY (FT-3): CPT

## 2024-08-26 PROCEDURE — 71045 X-RAY EXAM CHEST 1 VIEW: CPT

## 2024-08-26 PROCEDURE — 86900 BLOOD TYPING SEROLOGIC ABO: CPT

## 2024-08-26 PROCEDURE — 82962 GLUCOSE BLOOD TEST: CPT

## 2024-08-26 PROCEDURE — 36415 COLL VENOUS BLD VENIPUNCTURE: CPT

## 2024-08-26 PROCEDURE — 80053 COMPREHEN METABOLIC PANEL: CPT

## 2024-08-26 PROCEDURE — 71045 X-RAY EXAM CHEST 1 VIEW: CPT | Mod: 26

## 2024-08-26 PROCEDURE — C1769: CPT

## 2024-08-26 PROCEDURE — 84443 ASSAY THYROID STIM HORMONE: CPT

## 2024-08-26 PROCEDURE — 82088 ASSAY OF ALDOSTERONE: CPT

## 2024-08-26 PROCEDURE — 99285 EMERGENCY DEPT VISIT HI MDM: CPT

## 2024-08-26 RX ORDER — GLUCAGON INJECTION, SOLUTION 1 MG/.2ML
1 INJECTION, SOLUTION SUBCUTANEOUS ONCE
Refills: 0 | Status: DISCONTINUED | OUTPATIENT
Start: 2024-08-26 | End: 2024-08-29

## 2024-08-26 RX ORDER — DEXTROSE 15 G/33 G
25 GEL IN PACKET (GRAM) ORAL ONCE
Refills: 0 | Status: DISCONTINUED | OUTPATIENT
Start: 2024-08-26 | End: 2024-08-29

## 2024-08-26 RX ORDER — TAMSULOSIN HYDROCHLORIDE 0.4 MG/1
1 CAPSULE ORAL
Refills: 0 | DISCHARGE

## 2024-08-26 RX ORDER — ACETAMINOPHEN 325 MG/1
650 TABLET ORAL EVERY 8 HOURS
Refills: 0 | Status: DISCONTINUED | OUTPATIENT
Start: 2024-08-26 | End: 2024-08-29

## 2024-08-26 RX ORDER — FINASTERIDE 1 MG/1
5 TABLET, FILM COATED ORAL DAILY
Refills: 0 | Status: DISCONTINUED | OUTPATIENT
Start: 2024-08-26 | End: 2024-08-29

## 2024-08-26 RX ORDER — ENALAPRIL MALEATE 5 MG/1
20 TABLET ORAL DAILY
Refills: 0 | Status: DISCONTINUED | OUTPATIENT
Start: 2024-08-26 | End: 2024-08-27

## 2024-08-26 RX ORDER — FINASTERIDE 1 MG/1
1 TABLET, FILM COATED ORAL
Refills: 0 | DISCHARGE

## 2024-08-26 RX ORDER — OXYCODONE AND ACETAMINOPHEN 7.5; 325 MG/1; MG/1
1 TABLET ORAL
Refills: 0 | DISCHARGE

## 2024-08-26 RX ORDER — DEXTROSE 15 G/33 G
12.5 GEL IN PACKET (GRAM) ORAL ONCE
Refills: 0 | Status: DISCONTINUED | OUTPATIENT
Start: 2024-08-26 | End: 2024-08-29

## 2024-08-26 RX ORDER — DEXTROSE 15 G/33 G
15 GEL IN PACKET (GRAM) ORAL ONCE
Refills: 0 | Status: DISCONTINUED | OUTPATIENT
Start: 2024-08-26 | End: 2024-08-29

## 2024-08-26 RX ORDER — TAMSULOSIN HYDROCHLORIDE 0.4 MG/1
0.4 CAPSULE ORAL AT BEDTIME
Refills: 0 | Status: DISCONTINUED | OUTPATIENT
Start: 2024-08-26 | End: 2024-08-29

## 2024-08-26 RX ORDER — HEPARIN SODIUM,BOVINE 1000/ML
5000 VIAL (ML) INJECTION EVERY 12 HOURS
Refills: 0 | Status: DISCONTINUED | OUTPATIENT
Start: 2024-08-26 | End: 2024-08-26

## 2024-08-26 RX ADMIN — ENALAPRIL MALEATE 20 MILLIGRAM(S): 5 TABLET ORAL at 20:00

## 2024-08-26 NOTE — ED PROVIDER NOTE - PHYSICAL EXAMINATION
CONSTITUTIONAL: Well-developed; well-nourished;  SKIN: warm, dry  HEAD: Normocephalic; atraumatic.  EYES: PERRL, EOMI, no conjunctival erythema  ENT: No nasal discharge; airway clear.  NECK: Supple; non tender.  CARD: Bradycardia    RESP: No wheezes, rales or rhonchi.  ABD: soft ntnd  EXT: Normal ROM.  No clubbing, cyanosis or edema.   LYMPH: No acute cervical adenopathy.  NEURO: Alert, oriented, grossly unremarkable  PSYCH: Cooperative, appropriate.

## 2024-08-26 NOTE — ED PROVIDER NOTE - ATTENDING CONTRIBUTION TO CARE
84-year-old male history of hypertension, hyperlipidemia, now presents for evaluation of bradycardia.  Referred for pacemaker placement.  No chest pain or shortness of breath.  Just feels tired.    vss, bradycardia, nontoxic no respiratory distress, pink conj, anicteric, MMM, neck supple, CTAB, RRR, equal radial pulses bilat, abd soft/nt/nd, no cva tend. no calves tend, no edema, no fnd. no rashes.

## 2024-08-26 NOTE — H&P ADULT - HISTORY OF PRESENT ILLNESS
Patient is a 84 year old male with PMH HTN, HLD, DM, BPH, Recurrent UTIs, turbt who was sent to the ed by Dr. Duke for ppm placement. Patient had recent admission for Left flank pain and arm pain and was noted to be bradycardic. Patient denies any CP, palpitations, syncope, but reports occasional lightheadedness and some dyspnea on exertion.  In the Ed, Vs 176/78, hr 45 temp 98.5, k 4.8, cr 1.4, glucose 220, a1c 9.3, EKG junctional rhythm hr 48bpm.  Patient is a 84 year old male with PMH HTN, HLD, DM, BPH, Recurrent UTIs, turbt who was sent to the ed by Dr. Duke for ppm placement. Patient had recent admission for Left flank pain and arm pain and was noted to be bradycardic. For the past two weeks patient has been feeling lethargic, off balance, and dizzy. Daughter said that he has also been SOB with exertion and upon rest. Pt denies chest pain, palpitations, syncope, diaphoresis.    Of note patient went to [facility] with chest pain; he was told to follow up with a cardiologist.     In the Ed, Vs 176/78, hr 45 temp 98.5, k 4.8, cr 1.4, glucose 220, a1c 9.3, EKG junctional rhythm hr 48bpm.

## 2024-08-26 NOTE — CONSULT NOTE ADULT - SUBJECTIVE AND OBJECTIVE BOX
Patient is a 84y old  Male who presents with a chief complaint of symptomatic bradycardia    HPI: Patient is a 85 yo M with history of HTN, HL, DM, BPH, recurrent UTIs, TURBT, referred for evaluation of bradycardia. Pt recently in ER twice in the last few weeks for left flank pain and left arm pain. Pt previously seen by other cardiologist unsure of name about ~1 month ago for clearance for urology procedure (TURBT). While pt was being treated in ER for back and arm pain pt was bradycardic, advised to see a cardiologist. Denies CP, palpitations, syncope. Pt reports occasional lightheadedness and some dyspnea on exertion. Patient sent to the ED by Dr Duke for PPM placement.      PAST MEDICAL & SURGICAL HISTORY:  Diabetes mellitus      HTN (hypertension)      HLD (hyperlipidemia)      BPH with urinary obstruction      Osteoarthritis      Bilateral cataracts      History of knee replacement, total, left      H/O cataract extraction  with bl iol's      PREVIOUS DIAGNOSTIC TESTING:      ECHO  FINDINGS:    STRESS  FINDINGS:    CATHETERIZATION  FINDINGS:    ELECTROPHYSIOLOGY STUDY  FINDINGS:    CAROTID ULTRASOUND:  FINDINGS    VENOUS DUPLEX SCAN:  FINDINGS:    CHEST CT PULMONARY ANGIO with IV Contrast:  FINDINGS:    MEDICATIONS  (STANDING):  · 	cefpodoxime 200 mg oral tablet: 1 tab(s) orally 2 times a day  · 	sulfamethoxazole-trimethoprim 400 mg-80 mg oral tablet: 1 tab(s) orally every 12 hours  · 	Januvia 100 mg oral tablet: 1 tab(s) orally once a day  · 	pregabalin 150 mg oral capsule: 1 cap(s) orally once a day  · 	Lantus 100 units/mL subcutaneous solution: 10 unit(s) subcutaneous once a day (at bedtime)  · 	pioglitazone 45 mg oral tablet: 1 tab(s) orally once a day  · 	metFORMIN 1000 mg oral tablet: 1 tab(s) orally 2 times a day  · 	oxycodone-acetaminophen 7.5 mg-325 mg oral tablet: 1 tab(s) orally once a day  · 	amLODIPine 10 mg oral tablet: 1 tab(s) orally once a day  · 	glipiZIDE 5 mg oral tablet: 1 tab(s) orally 2 times a day  · 	enalapril 20 mg oral tablet: 1 tab(s) orally once a day    MEDICATIONS  (PRN):      FAMILY HISTORY: No significant hx    SOCIAL HISTORY: No smoking, ETOH or illicit drug use    CIGARETTES:    ALCOHOL:    Past Surgical History:    Allergies:    No Known Allergies      REVIEW OF SYSTEMS:    CONSTITUTIONAL: No fever, weight loss, chills, shakes, or fatigue  RESPIRATORY: No cough, wheezing, hemoptysis, or shortness of breath  CARDIOVASCULAR: No chest pain, dyspnea, palpitations, dizziness, syncope, paroxysmal nocturnal dyspnea, orthopnea, or arm or leg swelling  GASTROINTESTINAL: No abdominal  or epigastric pain, nausea, vomiting, hematemesis, diarrhea, constipation, melena or bright red blood.  NEUROLOGICAL: No headaches, memory loss, slurred speech, limb weakness, loss of strength, numbness, or tremors  MUSCULOSKELETAL: No joint pain or swelling, muscle, back, or extremity pain      Vital Signs Last 24 Hrs  T(C): 36.9 (26 Aug 2024 10:20), Max: 36.9 (26 Aug 2024 10:20)  T(F): 98.5 (26 Aug 2024 10:20), Max: 98.5 (26 Aug 2024 10:20)  HR: 45 (26 Aug 2024 10:20) (45 - 45)  BP: 176/78 (26 Aug 2024 10:20) (176/78 - 176/78)  BP(mean): --  RR: 18 (26 Aug 2024 10:20) (18 - 18)  SpO2: 97% (26 Aug 2024 10:20) (97% - 97%)    Parameters below as of 26 Aug 2024 10:20  Patient On (Oxygen Delivery Method): room air        PHYSICAL EXAM:        GENERAL: In no apparent distress, well nourished, and hydrated.  NECK: Supple, No JVD   HEART: Regular rate and rhythm; No murmurs, rubs, or gallops.  PULMONARY: Clear to auscultation and perfusion.  No rales, wheezing, or rhonchi bilaterally.  EXTREMITIES:  2+ Peripheral Pulses, no LE edema BL  NEUROLOGICAL: Grossly nonfocal      INTERPRETATION OF TELEMETRY:    ECG:    I&O's Detail      LABS:                        12.7   9.38  )-----------( 217      ( 26 Aug 2024 11:29 )             38.2     08-26    142  |  108  |  21<H>  ----------------------------<  197<H>  4.4   |  23  |  1.3    Ca    9.2      26 Aug 2024 11:29  Mg     2.0     08-26    TPro  6.4  /  Alb  3.9  /  TBili  0.5  /  DBili  x   /  AST  14  /  ALT  16  /  AlkPhos  67  08-26        PT/INR - ( 26 Aug 2024 11:29 )   PT: 11.40 sec;   INR: 1.00 ratio         PTT - ( 26 Aug 2024 11:29 )  PTT:28.8 sec  Urinalysis Basic - ( 26 Aug 2024 11:29 )    Color: x / Appearance: x / SG: x / pH: x  Gluc: 197 mg/dL / Ketone: x  / Bili: x / Urobili: x   Blood: x / Protein: x / Nitrite: x   Leuk Esterase: x / RBC: x / WBC x   Sq Epi: x / Non Sq Epi: x / Bacteria: x      BNP  I&O's Detail    Daily Height in cm: 167.64 (26 Aug 2024 10:20)    Daily     RADIOLOGY & ADDITIONAL STUDIES: Patient is a 84y old  Male who presents with a chief complaint of symptomatic bradycardia    HPI: Patient is a 83 yo M with history of HTN, HL, DM, BPH, recurrent UTIs, TURBT, referred for evaluation of bradycardia. Pt recently in ER twice in the last few weeks for left flank pain and left arm pain. Pt previously seen by other cardiologist unsure of name about ~1 month ago for clearance for urology procedure (TURBT). While pt was being treated in ER for back and arm pain pt was bradycardic, advised to see a cardiologist. Denies CP, palpitations, syncope. Pt reports occasional lightheadedness and some dyspnea on exertion. Patient sent to the ED by Dr Duke for PPM placement.      PAST MEDICAL & SURGICAL HISTORY:  Diabetes mellitus      HTN (hypertension)      HLD (hyperlipidemia)      BPH with urinary obstruction      Osteoarthritis      Bilateral cataracts      History of knee replacement, total, left      H/O cataract extraction  with bl iol's      PREVIOUS DIAGNOSTIC TESTING:      ECHO  FINDINGS:    STRESS  FINDINGS:    CATHETERIZATION  FINDINGS:    ELECTROPHYSIOLOGY STUDY  FINDINGS:    CAROTID ULTRASOUND:  FINDINGS    VENOUS DUPLEX SCAN:  FINDINGS:    CHEST CT PULMONARY ANGIO with IV Contrast:  FINDINGS:    MEDICATIONS  (STANDING):  · 	cefpodoxime 200 mg oral tablet: 1 tab(s) orally 2 times a day  · 	sulfamethoxazole-trimethoprim 400 mg-80 mg oral tablet: 1 tab(s) orally every 12 hours  · 	Januvia 100 mg oral tablet: 1 tab(s) orally once a day  · 	pregabalin 150 mg oral capsule: 1 cap(s) orally once a day  · 	Lantus 100 units/mL subcutaneous solution: 10 unit(s) subcutaneous once a day (at bedtime)  · 	pioglitazone 45 mg oral tablet: 1 tab(s) orally once a day  · 	metFORMIN 1000 mg oral tablet: 1 tab(s) orally 2 times a day  · 	oxycodone-acetaminophen 7.5 mg-325 mg oral tablet: 1 tab(s) orally once a day  · 	amLODIPine 10 mg oral tablet: 1 tab(s) orally once a day  · 	glipiZIDE 5 mg oral tablet: 1 tab(s) orally 2 times a day  · 	enalapril 20 mg oral tablet: 1 tab(s) orally once a day    MEDICATIONS  (PRN):      FAMILY HISTORY: No significant hx    SOCIAL HISTORY: No smoking, ETOH or illicit drug use    Past Surgical History: see above    Allergies:  No Known Allergies      REVIEW OF SYSTEMS:  CONSTITUTIONAL: No fever, weight loss, chills, shakes, or fatigue  RESPIRATORY: No cough, wheezing, hemoptysis, or shortness of breath  CARDIOVASCULAR: +dizziness; No chest pain, dyspnea, palpitations, syncope, paroxysmal nocturnal dyspnea, orthopnea, or arm or leg swelling  GASTROINTESTINAL: No abdominal  or epigastric pain, nausea, vomiting, hematemesis, diarrhea, constipation, melena or bright red blood.  NEUROLOGICAL: No headaches, memory loss, slurred speech, limb weakness, loss of strength, numbness, or tremors  MUSCULOSKELETAL: No joint pain or swelling, muscle, back, or extremity pain      Vital Signs Last 24 Hrs  T(C): 36.9 (26 Aug 2024 10:20), Max: 36.9 (26 Aug 2024 10:20)  T(F): 98.5 (26 Aug 2024 10:20), Max: 98.5 (26 Aug 2024 10:20)  HR: 45 (26 Aug 2024 10:20) (45 - 45)  BP: 176/78 (26 Aug 2024 10:20) (176/78 - 176/78)  BP(mean): --  RR: 18 (26 Aug 2024 10:20) (18 - 18)  SpO2: 97% (26 Aug 2024 10:20) (97% - 97%)    Parameters below as of 26 Aug 2024 10:20  Patient On (Oxygen Delivery Method): room air        PHYSICAL EXAM:  GENERAL: In no apparent distress, well nourished, and hydrated.  NECK: Supple, No JVD   HEART: Irregular rate and rhythm; No murmurs, rubs, or gallops.  PULMONARY: Clear to auscultation and perfusion.  No rales, wheezing, or rhonchi bilaterally.  EXTREMITIES:  2+ Peripheral Pulses, no LE edema BL  NEUROLOGICAL: Grossly nonfocal      INTERPRETATION OF TELEMETRY: Sinus bradycardia 35 bpm    ECG:  < from: 12 Lead ECG (08.02.24 @ 23:29) >  Ventricular Rate 37 BPM    Atrial Rate 37 BPM    P-R Interval 210 ms    QRS Duration 124 ms    Q-T Interval 472 ms    QTC Calculation(Bazett) 370 ms    P Axis 70 degrees    R Axis -26 degrees    T Axis 20 degrees    Diagnosis Line Marked sinus bradycardia with 1st degree A-V block  Non-specific intra-ventricular conduction delay  Nonspecific T wave abnormality  Abnormal ECG    Confirmed by Alysha Espinosa MD [id number]) on 8/4/2024 3:12:08 PM    < end of copied text >      I&O's Detail      LABS:                        12.7   9.38  )-----------( 217      ( 26 Aug 2024 11:29 )             38.2     08-26    142  |  108  |  21<H>  ----------------------------<  197<H>  4.4   |  23  |  1.3    Ca    9.2      26 Aug 2024 11:29  Mg     2.0     08-26    TPro  6.4  /  Alb  3.9  /  TBili  0.5  /  DBili  x   /  AST  14  /  ALT  16  /  AlkPhos  67  08-26        PT/INR - ( 26 Aug 2024 11:29 )   PT: 11.40 sec;   INR: 1.00 ratio         PTT - ( 26 Aug 2024 11:29 )  PTT:28.8 sec  Urinalysis Basic - ( 26 Aug 2024 11:29 )    Color: x / Appearance: x / SG: x / pH: x  Gluc: 197 mg/dL / Ketone: x  / Bili: x / Urobili: x   Blood: x / Protein: x / Nitrite: x   Leuk Esterase: x / RBC: x / WBC x   Sq Epi: x / Non Sq Epi: x / Bacteria: x      BNP  I&O's Detail    Daily Height in cm: 167.64 (26 Aug 2024 10:20)    Daily     RADIOLOGY & ADDITIONAL STUDIES: Patient is a 84y old  Male who presents with a chief complaint of symptomatic bradycardia    HPI: Patient is a 83 yo M with history of HTN, HL, DM, BPH, recurrent UTIs, TURBT, referred for evaluation of bradycardia. Pt recently in ER twice in the last few weeks for left flank pain and left arm pain. Pt previously seen by other cardiologist unsure of name about ~1 month ago for clearance for urology procedure (TURBT). While pt was being treated in ER for back and arm pain pt was bradycardic, advised to see a cardiologist. Denies CP, palpitations, syncope. Pt reports occasional lightheadedness and some dyspnea on exertion. Patient sent to the ED by Dr Duke for PPM placement.      PAST MEDICAL & SURGICAL HISTORY:  Diabetes mellitus      HTN (hypertension)      HLD (hyperlipidemia)      BPH with urinary obstruction      Osteoarthritis      Bilateral cataracts      History of knee replacement, total, left      H/O cataract extraction  with bl iol's      PREVIOUS DIAGNOSTIC TESTING:      ECHO  FINDINGS:    STRESS  FINDINGS:    CATHETERIZATION  FINDINGS:    ELECTROPHYSIOLOGY STUDY  FINDINGS:    CAROTID ULTRASOUND:  FINDINGS    VENOUS DUPLEX SCAN:  FINDINGS:    CHEST CT PULMONARY ANGIO with IV Contrast:  FINDINGS:    MEDICATIONS  (STANDING):  · 	cefpodoxime 200 mg oral tablet: 1 tab(s) orally 2 times a day  · 	sulfamethoxazole-trimethoprim 400 mg-80 mg oral tablet: 1 tab(s) orally every 12 hours  · 	Januvia 100 mg oral tablet: 1 tab(s) orally once a day  · 	pregabalin 150 mg oral capsule: 1 cap(s) orally once a day  · 	Lantus 100 units/mL subcutaneous solution: 10 unit(s) subcutaneous once a day (at bedtime)  · 	pioglitazone 45 mg oral tablet: 1 tab(s) orally once a day  · 	metFORMIN 1000 mg oral tablet: 1 tab(s) orally 2 times a day  · 	oxycodone-acetaminophen 7.5 mg-325 mg oral tablet: 1 tab(s) orally once a day  · 	amLODIPine 10 mg oral tablet: 1 tab(s) orally once a day  · 	glipiZIDE 5 mg oral tablet: 1 tab(s) orally 2 times a day  · 	enalapril 20 mg oral tablet: 1 tab(s) orally once a day    MEDICATIONS  (PRN):      FAMILY HISTORY: No significant hx    SOCIAL HISTORY: No smoking, ETOH or illicit drug use    Past Surgical History: see above    Allergies:  No Known Allergies      REVIEW OF SYSTEMS:  CONSTITUTIONAL: No fever, weight loss, chills, shakes, or fatigue  RESPIRATORY: No cough, wheezing, hemoptysis, or shortness of breath  CARDIOVASCULAR: +dizziness; No chest pain, dyspnea, palpitations, syncope, paroxysmal nocturnal dyspnea, orthopnea, or arm or leg swelling  GASTROINTESTINAL: No abdominal  or epigastric pain, nausea, vomiting, hematemesis, diarrhea, constipation, melena or bright red blood.  NEUROLOGICAL: No headaches, memory loss, slurred speech, limb weakness, loss of strength, numbness, or tremors  MUSCULOSKELETAL: No joint pain or swelling, muscle, back, or extremity pain      Vital Signs Last 24 Hrs  T(C): 36.9 (26 Aug 2024 10:20), Max: 36.9 (26 Aug 2024 10:20)  T(F): 98.5 (26 Aug 2024 10:20), Max: 98.5 (26 Aug 2024 10:20)  HR: 45 (26 Aug 2024 10:20) (45 - 45)  BP: 176/78 (26 Aug 2024 10:20) (176/78 - 176/78)  BP(mean): --  RR: 18 (26 Aug 2024 10:20) (18 - 18)  SpO2: 97% (26 Aug 2024 10:20) (97% - 97%)    Parameters below as of 26 Aug 2024 10:20  Patient On (Oxygen Delivery Method): room air        PHYSICAL EXAM:  GENERAL: In no apparent distress, well nourished, and hydrated.  NECK: Supple  HEART: Bradycardic  PULMONARY: Clear to auscultation and perfusion.  No rales, wheezing, or rhonchi bilaterally.  EXTREMITIES:  2+ Peripheral Pulses, no LE edema BL  NEUROLOGICAL: Grossly nonfocal      INTERPRETATION OF TELEMETRY: Sinus bradycardia 35 bpm    ECG:  < from: 12 Lead ECG (08.02.24 @ 23:29) >  Ventricular Rate 37 BPM    Atrial Rate 37 BPM    P-R Interval 210 ms    QRS Duration 124 ms    Q-T Interval 472 ms    QTC Calculation(Bazett) 370 ms    P Axis 70 degrees    R Axis -26 degrees    T Axis 20 degrees    Diagnosis Line Marked sinus bradycardia with 1st degree A-V block  Non-specific intra-ventricular conduction delay  Nonspecific T wave abnormality  Abnormal ECG    Confirmed by Alysha Espinosa MD [id number]) on 8/4/2024 3:12:08 PM    < end of copied text >      I&O's Detail      LABS:                        12.7   9.38  )-----------( 217      ( 26 Aug 2024 11:29 )             38.2     08-26    142  |  108  |  21<H>  ----------------------------<  197<H>  4.4   |  23  |  1.3    Ca    9.2      26 Aug 2024 11:29  Mg     2.0     08-26    TPro  6.4  /  Alb  3.9  /  TBili  0.5  /  DBili  x   /  AST  14  /  ALT  16  /  AlkPhos  67  08-26        PT/INR - ( 26 Aug 2024 11:29 )   PT: 11.40 sec;   INR: 1.00 ratio         PTT - ( 26 Aug 2024 11:29 )  PTT:28.8 sec  Urinalysis Basic - ( 26 Aug 2024 11:29 )    Color: x / Appearance: x / SG: x / pH: x  Gluc: 197 mg/dL / Ketone: x  / Bili: x / Urobili: x   Blood: x / Protein: x / Nitrite: x   Leuk Esterase: x / RBC: x / WBC x   Sq Epi: x / Non Sq Epi: x / Bacteria: x      BNP  I&O's Detail    Daily Height in cm: 167.64 (26 Aug 2024 10:20)    Daily     RADIOLOGY & ADDITIONAL STUDIES:

## 2024-08-26 NOTE — ED ADULT NURSE NOTE - NSFALLHARMRISKINTERV_ED_ALL_ED

## 2024-08-26 NOTE — ED PROVIDER NOTE - WR INTERPRETATION 1
ED CXR prelim, my independent interpretation - Dr. Sammy Dahl: No pneumothorax no infiltrates positive cardiomegaly no free air

## 2024-08-26 NOTE — CONSULT NOTE ADULT - ASSESSMENT
EP: Dr Duke    83 yo M with history of HTN, HL, DM, BPH, recurrent UTIs, TURBT, referred for evaluation of bradycardia.    Impression:  Symptomatic Sinus Bradycardia  HTN  HLD  DM    Plan:  - TTE  - NPO after midnight for possible PPM tomorrow as add on  - Cont tele monitoring  - Monitor electrolytes, maintain WNL  - Hold anticoagulation tonight  - Will follow

## 2024-08-26 NOTE — ED PROVIDER NOTE - OBJECTIVE STATEMENT
84-year-old male past medical history hypertension hyperlipidemia presents today from Dr. Duke's office for referral for pacemaker placement.  Patient found to be in sinus bradycardia and said he has been tired for the last several weeks but denies any chest pain shortness of breath abdominal pain fevers or chills.

## 2024-08-26 NOTE — H&P ADULT - NSHPLABSRESULTS_GEN_ALL_CORE
- ECG 8/26/24: EKG junctional rhythm hr 48bpm.    -Echo: Pending   -Radiology:   Chest x-ray 8/26/24: No pneumothorax no infiltrates positive cardiomegaly no free air.  - Labs:                        12.7   9.38  )-----------( 217      ( 26 Aug 2024 11:29 )             38.2     08-26    142  |  108  |  21<H>  ----------------------------<  197<H>  4.4   |  23  |  1.3    Ca    9.2      26 Aug 2024 11:29  Mg     2.0     08-26    TPro  6.4  /  Alb  3.9  /  TBili  0.5  /  DBili  x   /  AST  14  /  ALT  16  /  AlkPhos  67  08-26    LIVER FUNCTIONS - ( 26 Aug 2024 11:29 )  Alb: 3.9 g/dL / Pro: 6.4 g/dL / ALK PHOS: 67 U/L / ALT: 16 U/L / AST: 14 U/L / GGT: x           PT/INR - ( 26 Aug 2024 11:29 )   PT: 11.40 sec;   INR: 1.00 ratio         PTT - ( 26 Aug 2024 11:29 )  PTT:28.8 sec    Lactate Trend    Urinalysis Basic - ( 26 Aug 2024 11:29 )    Color: x / Appearance: x / SG: x / pH: x  Gluc: 197 mg/dL / Ketone: x  / Bili: x / Urobili: x   Blood: x / Protein: x / Nitrite: x   Leuk Esterase: x / RBC: x / WBC x   Sq Epi: x / Non Sq Epi: x / Bacteria: x

## 2024-08-26 NOTE — H&P ADULT - NS ATTEND AMEND GEN_ALL_CORE FT
Patient seen and evaluated by me on 8/26/24.     84yoM with HTN, HLD, DM, and BPH s/p TURP with recurrent UTIs who was sent to the ED for PPM placement. Patient has been lethargic, dizzy, and off balance for 2 weeks. He was noted to have bradycardia, and therefore referred to EP. ECG showed sinus bradycardia. In the ED, his ECG demonstrates junctional rhythm.     Of note, patient was seen at [facility] for "chest pain," though on further questioning, patient states it was back pain.     Plan:   - Place pacer pads  - Atropine at bedside  - Check troponin  - TTE ordered  - Continue enalapril 20 mg daily   - Monitor BP  - If hypertensive, will resume amlodipine  - NPO at MN  - PPM tomorrow  - Plan discussed with patient, wife, and daughter Patient seen and evaluated by me on 8/26/24.     84yoM with HTN, HLD, DM, and BPH s/p TURP with recurrent UTIs who was sent to the ED for PPM placement. Patient has been lethargic, dizzy, and off balance for 2 weeks. He was noted to have bradycardia, and therefore referred to EP. ECG showed sinus bradycardia. In the ED, his ECG demonstrates junctional rhythm.     Of note, patient was seen at [facility] for "chest pain," though on further questioning, patient states it was back pain.     Plan:   - Place pacer pads  - Atropine at bedside  - Check TSH and FT4  - Check troponin  - TTE ordered  - Continue enalapril 20 mg daily   - Monitor BP  - If hypertensive, will resume amlodipine  - NPO at MN  - PPM tomorrow  - Plan discussed with patient, wife, and daughter

## 2024-08-26 NOTE — ED PROVIDER NOTE - CLINICAL SUMMARY MEDICAL DECISION MAKING FREE TEXT BOX
Junctional bradycardia requires pacemaker hemodynamically stable at this time.  Cardiology consulted admit to telemetry.    My independent interpretation - Dr. Sammy Dahl - of the following studies labs, imaging, ekg that showed: Junctional bradycardia    ..  Additional history obtained from patient's daughter.    Escalation to observation was considered.  At this time, patient requires inpatient hospitalization- monitored setting.

## 2024-08-26 NOTE — H&P ADULT - NSHPPHYSICALEXAM_GEN_ALL_CORE
VITALS:   T(C): 36.9 (08-26-24 @ 10:20), Max: 36.9 (08-26-24 @ 10:20)  HR: 45 (08-26-24 @ 10:20) (45 - 45)  BP: 176/78 (08-26-24 @ 10:20) (176/78 - 176/78)  RR: 18 (08-26-24 @ 10:20) (18 - 18)  SpO2: 97% (08-26-24 @ 10:20) (97% - 97%)    GENERAL: NAD, lying in bed comfortably looks lethargic   HEAD:  Atraumatic, normocephalic  EYES: EOMI, PERRLA, conjunctiva and sclera clear  ENT: Moist mucous membranes  NECK: Supple, no JVD  HEART: Regular rate and rhythm, no murmurs, rubs, or gallops  LUNGS: Unlabored respirations.  Clear to auscultation bilaterally, no crackles, wheezing, or rhonchi  ABDOMEN: Soft, nontender, nondistended, +BS  EXTREMITIES: 2+ peripheral pulses bilaterally. No clubbing, cyanosis, or edema  NERVOUS SYSTEM:  A&Ox3, no focal deficits   SKIN: No rashes or lesions

## 2024-08-26 NOTE — CONSULT NOTE ADULT - NS ATTEND AMEND GEN_ALL_CORE FT
Symptomatic sinus nelson with normal EF on echo    Rec DC PPM implant today    Risks/benefits/alternative explained. Patient is agreeable.

## 2024-08-26 NOTE — ED PROVIDER NOTE - INTERPRETATION
ED EKG: my independent interpretation - Dr. Sammy Dahl : Junctional bradycardia QRS at the regular intervals.  No P waves noted.  Possibly U wave in precordial leads.

## 2024-08-26 NOTE — H&P ADULT - ASSESSMENT
Patient is a 84 year old male with PMH HTN, HLD, DM, BPH, Recurrent UTIs, turbt who was sent to the ed by Dr. Duke for ppm placement.     #Junctional rhythm  -f/u AM EKG   -monitor on tele   -f/u AM labs   -K>4 and M>2  -PPM with Dr. Duke   -f/u Troponin     #HTN  -c/w Enalapril 20mg QD  -hold amlodipine 10mg QD     #HLD  -c/w Atorvastatin 40mg QD    #BPH  -c/w Tamsulosin 0.4 mg QD  -c/w Finasteride 5mg QD     #DM  -Hold home meds (Glipizide 10mg QD, Januvia 100mg QD, Metformin 1000 BID)   -ISS     #Pain   -Percocet 7.5-325    DVT Prophylaxis: Heparin q12  EMMANUEL Protonix 40mg QD  Patient is a 84 year old male with PMH HTN, HLD, DM, BPH, Recurrent UTIs, turbt who was sent to the ed by Dr. Duke for ppm placement.     #Junctional rhythm  -f/u AM EKG   -monitor on tele   -f/u AM labs   -K>4 and M>2  -PPM with Dr. Duke   -f/u Troponin     #HTN  -c/w Enalapril 20mg QD  -hold amlodipine 10mg QD     #HLD  -c/w Atorvastatin 40mg QD    #BPH  -c/w Tamsulosin 0.4 mg QD  -c/w Finasteride 5mg QD     #DM  -Hold home meds (Glipizide 10mg QD, Januvia 100mg QD, Metformin 1000 BID)   -ISS     #Pain   -Percocet 7.5-325    DVT Prophylaxis: Heparin q12  GI: Protonix 40mg QD

## 2024-08-27 ENCOUNTER — TRANSCRIPTION ENCOUNTER (OUTPATIENT)
Age: 84
End: 2024-08-27

## 2024-08-27 LAB
A1C WITH ESTIMATED AVERAGE GLUCOSE RESULT: 9.4 % — HIGH (ref 4–5.6)
ALBUMIN SERPL ELPH-MCNC: 3.7 G/DL — SIGNIFICANT CHANGE UP (ref 3.5–5.2)
ALP SERPL-CCNC: 66 U/L — SIGNIFICANT CHANGE UP (ref 30–115)
ALT FLD-CCNC: 15 U/L — SIGNIFICANT CHANGE UP (ref 0–41)
ANION GAP SERPL CALC-SCNC: 12 MMOL/L — SIGNIFICANT CHANGE UP (ref 7–14)
APTT BLD: 28.7 SEC — SIGNIFICANT CHANGE UP (ref 27–39.2)
AST SERPL-CCNC: 14 U/L — SIGNIFICANT CHANGE UP (ref 0–41)
BILIRUB SERPL-MCNC: 0.5 MG/DL — SIGNIFICANT CHANGE UP (ref 0.2–1.2)
BUN SERPL-MCNC: 19 MG/DL — SIGNIFICANT CHANGE UP (ref 10–20)
CALCIUM SERPL-MCNC: 9 MG/DL — SIGNIFICANT CHANGE UP (ref 8.4–10.4)
CHLORIDE SERPL-SCNC: 109 MMOL/L — SIGNIFICANT CHANGE UP (ref 98–110)
CHOLEST SERPL-MCNC: 169 MG/DL — SIGNIFICANT CHANGE UP
CO2 SERPL-SCNC: 23 MMOL/L — SIGNIFICANT CHANGE UP (ref 17–32)
CREAT SERPL-MCNC: 1.3 MG/DL — SIGNIFICANT CHANGE UP (ref 0.7–1.5)
EGFR: 55 ML/MIN/1.73M2 — LOW
ESTIMATED AVERAGE GLUCOSE: 223 MG/DL — HIGH (ref 68–114)
GLUCOSE BLDC GLUCOMTR-MCNC: 173 MG/DL — HIGH (ref 70–99)
GLUCOSE BLDC GLUCOMTR-MCNC: 194 MG/DL — HIGH (ref 70–99)
GLUCOSE BLDC GLUCOMTR-MCNC: 198 MG/DL — HIGH (ref 70–99)
GLUCOSE BLDC GLUCOMTR-MCNC: 212 MG/DL — HIGH (ref 70–99)
GLUCOSE SERPL-MCNC: 176 MG/DL — HIGH (ref 70–99)
HCT VFR BLD CALC: 40.9 % — LOW (ref 42–52)
HDLC SERPL-MCNC: 33 MG/DL — LOW
HGB BLD-MCNC: 13.1 G/DL — LOW (ref 14–18)
INR BLD: 0.99 RATIO — SIGNIFICANT CHANGE UP (ref 0.65–1.3)
LIPID PNL WITH DIRECT LDL SERPL: 102 MG/DL — HIGH
MAGNESIUM SERPL-MCNC: 1.9 MG/DL — SIGNIFICANT CHANGE UP (ref 1.8–2.4)
MCHC RBC-ENTMCNC: 27.1 PG — SIGNIFICANT CHANGE UP (ref 27–31)
MCHC RBC-ENTMCNC: 32 G/DL — SIGNIFICANT CHANGE UP (ref 32–37)
MCV RBC AUTO: 84.5 FL — SIGNIFICANT CHANGE UP (ref 80–94)
NON HDL CHOLESTEROL: 136 MG/DL — HIGH
NRBC # BLD: 0 /100 WBCS — SIGNIFICANT CHANGE UP (ref 0–0)
PLATELET # BLD AUTO: 222 K/UL — SIGNIFICANT CHANGE UP (ref 130–400)
PMV BLD: 11.7 FL — HIGH (ref 7.4–10.4)
POTASSIUM SERPL-MCNC: 3.8 MMOL/L — SIGNIFICANT CHANGE UP (ref 3.5–5)
POTASSIUM SERPL-SCNC: 3.8 MMOL/L — SIGNIFICANT CHANGE UP (ref 3.5–5)
PROT SERPL-MCNC: 6.2 G/DL — SIGNIFICANT CHANGE UP (ref 6–8)
PROTHROM AB SERPL-ACNC: 11.3 SEC — SIGNIFICANT CHANGE UP (ref 9.95–12.87)
RBC # BLD: 4.84 M/UL — SIGNIFICANT CHANGE UP (ref 4.7–6.1)
RBC # FLD: 13.2 % — SIGNIFICANT CHANGE UP (ref 11.5–14.5)
SODIUM SERPL-SCNC: 144 MMOL/L — SIGNIFICANT CHANGE UP (ref 135–146)
T3FREE SERPL-MCNC: 2.32 PG/ML — SIGNIFICANT CHANGE UP (ref 2–4.4)
T4 FREE SERPL-MCNC: 1.1 NG/DL — SIGNIFICANT CHANGE UP (ref 0.9–1.8)
T4 FREE SERPL-MCNC: 1.2 NG/DL
TRIGL SERPL-MCNC: 174 MG/DL — HIGH
TROPONIN T, HIGH SENSITIVITY RESULT: 47 NG/L — HIGH (ref 6–21)
TSH SERPL-ACNC: 2 UIU/ML
TSH SERPL-MCNC: 1.07 UIU/ML — SIGNIFICANT CHANGE UP (ref 0.27–4.2)
WBC # BLD: 9.25 K/UL — SIGNIFICANT CHANGE UP (ref 4.8–10.8)
WBC # FLD AUTO: 9.25 K/UL — SIGNIFICANT CHANGE UP (ref 4.8–10.8)

## 2024-08-27 PROCEDURE — 76937 US GUIDE VASCULAR ACCESS: CPT | Mod: 26

## 2024-08-27 PROCEDURE — 99223 1ST HOSP IP/OBS HIGH 75: CPT | Mod: 57

## 2024-08-27 PROCEDURE — 93010 ELECTROCARDIOGRAM REPORT: CPT

## 2024-08-27 PROCEDURE — 99232 SBSQ HOSP IP/OBS MODERATE 35: CPT

## 2024-08-27 PROCEDURE — 71045 X-RAY EXAM CHEST 1 VIEW: CPT | Mod: 26

## 2024-08-27 PROCEDURE — 0795T TCAT INS 2CHMBR LDLS PM CMPL: CPT

## 2024-08-27 RX ORDER — AMLODIPINE BESYLATE 10 MG/1
10 TABLET ORAL DAILY
Refills: 0 | Status: DISCONTINUED | OUTPATIENT
Start: 2024-08-27 | End: 2024-08-28

## 2024-08-27 RX ORDER — INSULIN GLARGINE 100 [IU]/ML
10 INJECTION, SOLUTION SUBCUTANEOUS
Refills: 0 | DISCHARGE

## 2024-08-27 RX ORDER — HYDRALAZINE HCL 50 MG
25 TABLET ORAL ONCE
Refills: 0 | Status: COMPLETED | OUTPATIENT
Start: 2024-08-27 | End: 2024-08-27

## 2024-08-27 RX ORDER — HYDRALAZINE HCL 50 MG
10 TABLET ORAL EVERY 8 HOURS
Refills: 0 | Status: DISCONTINUED | OUTPATIENT
Start: 2024-08-27 | End: 2024-08-28

## 2024-08-27 RX ORDER — VANCOMYCIN/0.9 % SOD CHLORIDE 1.75G/25
1000 PLASTIC BAG, INJECTION (ML) INTRAVENOUS ONCE
Refills: 0 | Status: COMPLETED | OUTPATIENT
Start: 2024-08-27 | End: 2024-08-27

## 2024-08-27 RX ORDER — VALSARTAN 40 MG/1
160 TABLET ORAL DAILY
Refills: 0 | Status: DISCONTINUED | OUTPATIENT
Start: 2024-08-28 | End: 2024-08-28

## 2024-08-27 RX ORDER — HYDRALAZINE HCL 50 MG
25 TABLET ORAL THREE TIMES A DAY
Refills: 0 | Status: DISCONTINUED | OUTPATIENT
Start: 2024-08-27 | End: 2024-08-27

## 2024-08-27 RX ORDER — ATROPINE SULFATE MONOHYDRATE 1 G/G
1 POWDER MISCELLANEOUS ONCE
Refills: 0 | Status: DISCONTINUED | OUTPATIENT
Start: 2024-08-27 | End: 2024-08-28

## 2024-08-27 RX ORDER — VANCOMYCIN/0.9 % SOD CHLORIDE 1.75G/25
1000 PLASTIC BAG, INJECTION (ML) INTRAVENOUS EVERY 12 HOURS
Refills: 0 | Status: COMPLETED | OUTPATIENT
Start: 2024-08-28 | End: 2024-08-28

## 2024-08-27 RX ORDER — INSULIN GLARGINE 100 [IU]/ML
8 INJECTION, SOLUTION SUBCUTANEOUS AT BEDTIME
Refills: 0 | Status: DISCONTINUED | OUTPATIENT
Start: 2024-08-27 | End: 2024-08-28

## 2024-08-27 RX ADMIN — Medication 1: at 16:42

## 2024-08-27 RX ADMIN — Medication 25 MILLIGRAM(S): at 15:59

## 2024-08-27 RX ADMIN — ENALAPRIL MALEATE 20 MILLIGRAM(S): 5 TABLET ORAL at 05:18

## 2024-08-27 RX ADMIN — Medication 40 MILLIGRAM(S): at 21:27

## 2024-08-27 RX ADMIN — Medication 10 MILLIGRAM(S): at 21:28

## 2024-08-27 RX ADMIN — FINASTERIDE 5 MILLIGRAM(S): 1 TABLET, FILM COATED ORAL at 16:11

## 2024-08-27 RX ADMIN — TAMSULOSIN HYDROCHLORIDE 0.4 MILLIGRAM(S): 0.4 CAPSULE ORAL at 21:27

## 2024-08-27 RX ADMIN — AMLODIPINE BESYLATE 10 MILLIGRAM(S): 10 TABLET ORAL at 08:55

## 2024-08-27 RX ADMIN — INSULIN GLARGINE 8 UNIT(S): 100 INJECTION, SOLUTION SUBCUTANEOUS at 21:27

## 2024-08-27 RX ADMIN — Medication 25 MILLIGRAM(S): at 00:34

## 2024-08-27 RX ADMIN — Medication 250 MILLIGRAM(S): at 12:45

## 2024-08-27 RX ADMIN — Medication 25 MILLIGRAM(S): at 06:34

## 2024-08-27 NOTE — PROGRESS NOTE ADULT - ASSESSMENT
Assesment:   Patient is a 84 year old male with PMH HTN, HLD, DM, BPH, Recurrent UTIs, turbt who was sent to the ed by Dr. Duke for ppm placement. Pt was hypertensive with BP in the 200s. EKG junctional rhythm hr 48bpm.    Plan:   #Junctional rhythm  -f/u AM EKG   -monitor on tele   -f/u AM labs   -K>4 and M>2  -PPM with Dr. Duke   -f/u Troponin     #HTN  -c/w Enalapril 20mg QD  -c/w amlodipine 10mg QD   - s/p Hydralazine 25 x2 ON   - c/w Hydralazine 25mg TID for better bp control       #HLD  -c/w Atorvastatin 40mg QD    #BPH  -c/w Tamsulosin 0.4 mg QD  -c/w Finasteride 5mg QD     #DM  -Hold home meds (Glipizide 10mg QD, Januvia 100mg QD, Metformin 1000 BID)   -ISS     #Pain   -Percocet 7.5-325 home med     DVT Prophylaxis: Heparin q12  GI: Protonix 40mg QD  Assesment:   Patient is a 84 year old male with PMH HTN, HLD, DM, BPH, Recurrent UTIs, turbt who was sent to the ed by Dr. Duke for ppm placement. Pt was hypertensive with BP in the 200s. EKG junctional rhythm hr 48bpm.    Plan:   #Junctional rhythm  -monitor on tele   -K>4 and M>2  -PPM with Dr. Duke   -Troponin: 40-->47  -TSH and T4 pending     #HTN  -c/w Enalapril 20mg QD  -c/w amlodipine 10mg QD   - s/p Hydralazine 25 x2 ON   - c/w Hydralazine 25mg TID for better bp control       #HLD  -c/w Atorvastatin 40mg QD  -Cholesterol: 169    LDL: 102     #BPH  -c/w Tamsulosin 0.4 mg QD  -c/w Finasteride 5mg QD     #DM  -Hold home meds (Glipizide 10mg QD, Januvia 100mg QD, Metformin 1000 BID)   -ISS   -A1c pending     #Pain   -Percocet 7.5-325 home med     DVT Prophylaxis: Heparin q12  GI: Protonix 40mg QD

## 2024-08-27 NOTE — PROGRESS NOTE ADULT - SUBJECTIVE AND OBJECTIVE BOX
Electrophysiology Brief Post-Op Note    I have personally seen and examined the patient.  I agree with the history and physical which I have reviewed and noted any changes below.  08-27-24 @ 11:15 AM    PRE-OP DIAGNOSIS: Symptomatic sinus bradycardia    POST-OP DIAGNOSIS: Symptomatic sinus bradycardia    PROCEDURE: Leadless PPM Implant (STJ DC Aveir)    Physician: Winifred Duke MD  Assistant: None    ACCESS: Right Femoral Vein  ESTIMATED BLOOD LOSS:    5   mL    ANESTHESIA TYPE:  [  ] General Anesthesia  [ x ] Sedation  [ x ] Local/Regional    CONDITION  [  ] Critical  [  ] Serious  [  ] Fair  [ x ] Good      SPECIMENS REMOVED (IF APPLICABLE): None    IMPLANTS (IF APPLICABLE): Leadless DC PPM - STJ Aveir    FINDINGS: Symptomatic sinus bradycardia    COMPLICATIONS: None    PLAN OF CARE   - Vancomycin 1g IV q12 h  - Chest X-ray PA now and PA/Lat tomorrow am  - Device interrogation tomorrow in am  - NO HEPARIN PRODUCTS OR LOVENOX   - Remove suture in morning  - Bedrest overnight      Winifred Duke MD   Electrophysiology Attending

## 2024-08-27 NOTE — PROGRESS NOTE ADULT - SUBJECTIVE AND OBJECTIVE BOX
Chief complaint: Patient is a 84y old  Male who presents with a chief complaint of Bradycardia (26 Aug 2024 15:02)    Interval history:    Review of systems: A complete 10-point review of systems was obtained and is negative except as stated in the interval history.    Vitals:  T(F): 97.8, Max: 97.9 (08-27 @ 00:10)  HR: 41 (40 - 48)  BP: 171/76 (171/76 - 223/92)  RR: 20 (18 - 20)  SpO2: 96% (95% - 98%)    Ins & outs:     08-26 @ 07:01  -  08-27 @ 07:00  --------------------------------------------------------  IN: 0 mL / OUT: 450 mL / NET: -450 mL      Weight trend:  Weight (kg): 73.6 (08-26)    Physical exam:  General: No apparent distress  HEENT: Anicteric sclera. Moist mucous membranes. JVP *** cm.   Cardiac: Regular rate and rhythm. No murmurs, rubs, or gallops.   Vascular: Symmetric radial pulses. Dorsalis pedis pulses palpable.   Respiratory: Normal effort. Bibasilar crackles. Clear to ascultation.   Abdomen: Soft, nontender. Audible bowel sounds.   Extremities: Warm with *** edema. No cyanosis or clubbing.   Skin: Warm and dry. No rash.   Neurologic: Grossly normal motor function.   Psychiatric: Oriented to person, place, and time.     Data reviewed:  - Telemetry:   - ECG (date***):   - TTE (date***):   - Chest x-ray (date***):   - Stress test:   - CCTA:  - Cardiac catheterization:  - Cardiac MRI:    - Labs:                        13.1   9.25  )-----------( 222      ( 27 Aug 2024 05:01 )             40.9     08-27    144  |  109  |  19  ----------------------------<  176<H>  3.8   |  23  |  1.3    Ca    9.0      27 Aug 2024 05:01  Mg     1.9     08-27    TPro  6.2  /  Alb  3.7  /  TBili  0.5  /  DBili  x   /  AST  14  /  ALT  15  /  AlkPhos  66  08-27    PT/INR - ( 27 Aug 2024 05:01 )   PT: 11.30 sec;   INR: 0.99 ratio         PTT - ( 27 Aug 2024 05:01 )  PTT:28.7 sec        Triglycerides, Serum: 174 mg/dL (08-27-24 @ 05:01)  LDL Cholesterol Calculated: 102 mg/dL (08-27-24 @ 05:01)    Thyroid Stimulating Hormone, Serum: 1.07 uIU/mL (08-27-24 @ 04:30)    Urinalysis Basic - ( 27 Aug 2024 05:01 )    Color: x / Appearance: x / SG: x / pH: x  Gluc: 176 mg/dL / Ketone: x  / Bili: x / Urobili: x   Blood: x / Protein: x / Nitrite: x   Leuk Esterase: x / RBC: x / WBC x   Sq Epi: x / Non Sq Epi: x / Bacteria: x        Medications:  amLODIPine   Tablet 10 milliGRAM(s) Oral daily  atorvastatin 40 milliGRAM(s) Oral at bedtime  dextrose 50% Injectable 25 Gram(s) IV Push once  dextrose 50% Injectable 25 Gram(s) IV Push once  dextrose 50% Injectable 12.5 Gram(s) IV Push once  enalapril 20 milliGRAM(s) Oral daily  finasteride 5 milliGRAM(s) Oral daily  glucagon  Injectable 1 milliGRAM(s) IntraMuscular once  hydrALAZINE 25 milliGRAM(s) Oral three times a day  insulin lispro (ADMELOG) corrective regimen sliding scale   SubCutaneous three times a day before meals  tamsulosin 0.4 milliGRAM(s) Oral at bedtime  vancomycin  IVPB 1000 milliGRAM(s) IV Intermittent once    Drips:  dextrose 5%. 1000 milliLiter(s) (50 mL/Hr) IV Continuous <Continuous>  dextrose 5%. 1000 milliLiter(s) (100 mL/Hr) IV Continuous <Continuous>    PRN:     Allergies    No Known Allergies    Intolerances      Assessment:      Problems discussed and associated plan:      Please contact me with any questions or concerns at [phone].   Chief complaint: Patient is a 84y old  Male who presents with a chief complaint of Bradycardia (26 Aug 2024 15:02)    Hospital Course: Patient is a 84 year old male with PMH HTN, HLD, DM, BPH, Recurrent UTIs, turbt who was sent to the ed by Dr. Duke for ppm placement. Patient had recent admission for Left flank pain and arm pain and was noted to be bradycardic. For the past two weeks patient has been feeling lethargic, off balance, and dizzy. Daughter said that he has also been SOB with exertion and upon rest. Pt denies chest pain, palpitations, syncope, diaphoresis.    Of note patient went to [facility] with chest pain; he was told to follow up with a cardiologist.   In the Ed, Vs 176/78, hr 45 temp 98.5, k 4.8, cr 1.4, glucose 220, a1c 9.3, EKG junctional rhythm hr 48bpm.     Interval history: Pt was seen and examined at bedside today in NAD. Pt denies chest pain, palpitations, SOB. Pt was HTN in the 200s was attempted to be controlled with hydralazine 25 TID prior to PPM with Dr. Duke.     Review of systems: A complete 10-point review of systems was obtained and is negative except as stated in the interval history.    Vitals:  T(F): 97.8, Max: 97.9 ( @ 00:10)  HR: 41 (40 - 48)  BP: 171/76 (171/76 - 223/92)  RR: 20 (18 - 20)  SpO2: 96% (95% - 98%)    Ins & outs:      @ 07:01  -   @ 07:00  --------------------------------------------------------  IN: 0 mL / OUT: 450 mL / NET: -450 mL    Weight trend:  Weight (kg): 73.6 ()    Physical exam:  GENERAL: NAD, lying in bed comfortably looks lethargic   HEAD:  Atraumatic, normocephalic  EYES: EOMI, PERRLA, conjunctiva and sclera clear  ENT: Moist mucous membranes  NECK: Supple, no JVD  HEART: Regular rate and rhythm, no murmurs, rubs, or gallops  LUNGS: Unlabored respirations.  Clear to auscultation bilaterally, no crackles, wheezing, or rhonchi  ABDOMEN: Soft, nontender, nondistended, +BS  EXTREMITIES: 2+ peripheral pulses bilaterally. No clubbing, cyanosis, or edema  NERVOUS SYSTEM:  A&Ox3, no focal deficits   SKIN: No rashes or lesions    Data reviewed:  - Telemetry: Sinus bradycardia 38-50bpm   - EC24: EKG junctional rhythm hr 48bpm.    -Echo: 24  1. Limited study.   2. Normal global left ventricular systolic function.   3. Left ventricular ejection fraction, by visual estimation, is 50 to   55%.  -Radiology:   Chest x-ray 24: No pneumothorax no infiltrates positive cardiomegaly no free air.    - Labs:                        13.1   9.25  )-----------( 222      ( 27 Aug 2024 05:01 )             40.9         144  |  109  |  19  ----------------------------<  176<H>  3.8   |  23  |  1.3    Ca    9.0      27 Aug 2024 05:01  Mg     1.9         TPro  6.2  /  Alb  3.7  /  TBili  0.5  /  DBili  x   /  AST  14  /  ALT  15  /  AlkPhos  66      PT/INR - ( 27 Aug 2024 05:01 )   PT: 11.30 sec;   INR: 0.99 ratio         PTT - ( 27 Aug 2024 05:01 )  PTT:28.7 sec    Triglycerides, Serum: 174 mg/dL (24 @ 05:01)  LDL Cholesterol Calculated: 102 mg/dL (24 @ 05:01)    Thyroid Stimulating Hormone, Serum: 1.07 uIU/mL (24 @ 04:30)    Urinalysis Basic - ( 27 Aug 2024 05:01 )    Color: x / Appearance: x / SG: x / pH: x  Gluc: 176 mg/dL / Ketone: x  / Bili: x / Urobili: x   Blood: x / Protein: x / Nitrite: x   Leuk Esterase: x / RBC: x / WBC x   Sq Epi: x / Non Sq Epi: x / Bacteria: x    Medications:  amLODIPine   Tablet 10 milliGRAM(s) Oral daily  atorvastatin 40 milliGRAM(s) Oral at bedtime  dextrose 50% Injectable 25 Gram(s) IV Push once  dextrose 50% Injectable 25 Gram(s) IV Push once  dextrose 50% Injectable 12.5 Gram(s) IV Push once  enalapril 20 milliGRAM(s) Oral daily  finasteride 5 milliGRAM(s) Oral daily  glucagon  Injectable 1 milliGRAM(s) IntraMuscular once  hydrALAZINE 25 milliGRAM(s) Oral three times a day  insulin lispro (ADMELOG) corrective regimen sliding scale   SubCutaneous three times a day before meals  tamsulosin 0.4 milliGRAM(s) Oral at bedtime  vancomycin  IVPB 1000 milliGRAM(s) IV Intermittent once    Drips:  dextrose 5%. 1000 milliLiter(s) (50 mL/Hr) IV Continuous <Continuous>  dextrose 5%. 1000 milliLiter(s) (100 mL/Hr) IV Continuous <Continuous>    PRN:     Allergies    No Known Allergies    Intolerances

## 2024-08-27 NOTE — PRE-ANESTHESIA EVALUATION ADULT - NSATTENDATTESTRD_GEN_ALL_CORE
The patient has been re-examined and I agree with the above assessment or I updated with my findings.
unable to assess.

## 2024-08-27 NOTE — PATIENT PROFILE ADULT - FALL HARM RISK - HARM RISK INTERVENTIONS

## 2024-08-28 LAB
ANION GAP SERPL CALC-SCNC: 11 MMOL/L — SIGNIFICANT CHANGE UP (ref 7–14)
BUN SERPL-MCNC: 15 MG/DL — SIGNIFICANT CHANGE UP (ref 10–20)
CALCIUM SERPL-MCNC: 8.9 MG/DL — SIGNIFICANT CHANGE UP (ref 8.4–10.5)
CHLORIDE SERPL-SCNC: 105 MMOL/L — SIGNIFICANT CHANGE UP (ref 98–110)
CO2 SERPL-SCNC: 22 MMOL/L — SIGNIFICANT CHANGE UP (ref 17–32)
CREAT SERPL-MCNC: 1.2 MG/DL — SIGNIFICANT CHANGE UP (ref 0.7–1.5)
EGFR: 60 ML/MIN/1.73M2 — SIGNIFICANT CHANGE UP
GLUCOSE BLDC GLUCOMTR-MCNC: 225 MG/DL — HIGH (ref 70–99)
GLUCOSE BLDC GLUCOMTR-MCNC: 238 MG/DL — HIGH (ref 70–99)
GLUCOSE BLDC GLUCOMTR-MCNC: 247 MG/DL — HIGH (ref 70–99)
GLUCOSE BLDC GLUCOMTR-MCNC: 264 MG/DL — HIGH (ref 70–99)
GLUCOSE BLDC GLUCOMTR-MCNC: 294 MG/DL — HIGH (ref 70–99)
GLUCOSE SERPL-MCNC: 260 MG/DL — HIGH (ref 70–99)
HCT VFR BLD CALC: 42.2 % — SIGNIFICANT CHANGE UP (ref 42–52)
HGB BLD-MCNC: 14.1 G/DL — SIGNIFICANT CHANGE UP (ref 14–18)
MAGNESIUM SERPL-MCNC: 1.8 MG/DL — SIGNIFICANT CHANGE UP (ref 1.8–2.4)
MCHC RBC-ENTMCNC: 27.7 PG — SIGNIFICANT CHANGE UP (ref 27–31)
MCHC RBC-ENTMCNC: 33.4 G/DL — SIGNIFICANT CHANGE UP (ref 32–37)
MCV RBC AUTO: 82.9 FL — SIGNIFICANT CHANGE UP (ref 80–94)
NRBC # BLD: 0 /100 WBCS — SIGNIFICANT CHANGE UP (ref 0–0)
PLATELET # BLD AUTO: 218 K/UL — SIGNIFICANT CHANGE UP (ref 130–400)
PMV BLD: 11.4 FL — HIGH (ref 7.4–10.4)
POTASSIUM SERPL-MCNC: 3.5 MMOL/L — SIGNIFICANT CHANGE UP (ref 3.5–5)
POTASSIUM SERPL-SCNC: 3.5 MMOL/L — SIGNIFICANT CHANGE UP (ref 3.5–5)
RBC # BLD: 5.09 M/UL — SIGNIFICANT CHANGE UP (ref 4.7–6.1)
RBC # FLD: 13.4 % — SIGNIFICANT CHANGE UP (ref 11.5–14.5)
SODIUM SERPL-SCNC: 138 MMOL/L — SIGNIFICANT CHANGE UP (ref 135–146)
WBC # BLD: 10.87 K/UL — HIGH (ref 4.8–10.8)
WBC # FLD AUTO: 10.87 K/UL — HIGH (ref 4.8–10.8)

## 2024-08-28 PROCEDURE — 93010 ELECTROCARDIOGRAM REPORT: CPT

## 2024-08-28 PROCEDURE — 71046 X-RAY EXAM CHEST 2 VIEWS: CPT | Mod: 26

## 2024-08-28 PROCEDURE — 99024 POSTOP FOLLOW-UP VISIT: CPT

## 2024-08-28 PROCEDURE — 93286 PERI-PX EVAL PM/LDLS PM IP: CPT | Mod: 26

## 2024-08-28 RX ORDER — POTASSIUM CHLORIDE 10 MEQ
40 TABLET, EXT RELEASE, PARTICLES/CRYSTALS ORAL ONCE
Refills: 0 | Status: COMPLETED | OUTPATIENT
Start: 2024-08-28 | End: 2024-08-28

## 2024-08-28 RX ORDER — CLONIDINE HCL 0.2 MG
0.1 TABLET ORAL DAILY
Refills: 0 | Status: DISCONTINUED | OUTPATIENT
Start: 2024-08-28 | End: 2024-08-28

## 2024-08-28 RX ORDER — CLONIDINE HCL 0.2 MG
1 TABLET ORAL
Refills: 0 | Status: DISCONTINUED | OUTPATIENT
Start: 2024-08-28 | End: 2024-08-29

## 2024-08-28 RX ORDER — NIFEDIPINE 60 MG/1
60 TABLET, FILM COATED, EXTENDED RELEASE ORAL ONCE
Refills: 0 | Status: COMPLETED | OUTPATIENT
Start: 2024-08-28 | End: 2024-08-28

## 2024-08-28 RX ORDER — VALSARTAN 40 MG/1
240 TABLET ORAL DAILY
Refills: 0 | Status: DISCONTINUED | OUTPATIENT
Start: 2024-08-29 | End: 2024-08-29

## 2024-08-28 RX ORDER — SPIRONOLACTONE 25 MG/1
25 TABLET, FILM COATED ORAL DAILY
Refills: 0 | Status: DISCONTINUED | OUTPATIENT
Start: 2024-08-28 | End: 2024-08-29

## 2024-08-28 RX ORDER — NIFEDIPINE 60 MG/1
30 TABLET, FILM COATED, EXTENDED RELEASE ORAL ONCE
Refills: 0 | Status: DISCONTINUED | OUTPATIENT
Start: 2024-08-28 | End: 2024-08-28

## 2024-08-28 RX ORDER — NIFEDIPINE 60 MG/1
60 TABLET, FILM COATED, EXTENDED RELEASE ORAL DAILY
Refills: 0 | Status: DISCONTINUED | OUTPATIENT
Start: 2024-08-29 | End: 2024-08-29

## 2024-08-28 RX ORDER — INSULIN GLARGINE 100 [IU]/ML
10 INJECTION, SOLUTION SUBCUTANEOUS AT BEDTIME
Refills: 0 | Status: DISCONTINUED | OUTPATIENT
Start: 2024-08-28 | End: 2024-08-29

## 2024-08-28 RX ORDER — VALSARTAN 40 MG/1
80 TABLET ORAL ONCE
Refills: 0 | Status: COMPLETED | OUTPATIENT
Start: 2024-08-28 | End: 2024-08-28

## 2024-08-28 RX ADMIN — Medication 10 MILLIGRAM(S): at 05:35

## 2024-08-28 RX ADMIN — Medication 10 MILLIGRAM(S): at 14:06

## 2024-08-28 RX ADMIN — Medication 250 MILLIGRAM(S): at 01:05

## 2024-08-28 RX ADMIN — Medication 250 MILLIGRAM(S): at 13:01

## 2024-08-28 RX ADMIN — FINASTERIDE 5 MILLIGRAM(S): 1 TABLET, FILM COATED ORAL at 12:06

## 2024-08-28 RX ADMIN — VALSARTAN 160 MILLIGRAM(S): 40 TABLET ORAL at 05:35

## 2024-08-28 RX ADMIN — Medication 2: at 10:23

## 2024-08-28 RX ADMIN — VALSARTAN 80 MILLIGRAM(S): 40 TABLET ORAL at 12:20

## 2024-08-28 RX ADMIN — Medication 0.1 MILLIGRAM(S): at 16:04

## 2024-08-28 RX ADMIN — Medication 40 MILLIEQUIVALENT(S): at 15:57

## 2024-08-28 RX ADMIN — SPIRONOLACTONE 25 MILLIGRAM(S): 25 TABLET, FILM COATED ORAL at 12:07

## 2024-08-28 RX ADMIN — Medication 25 MILLIGRAM(S): at 00:20

## 2024-08-28 RX ADMIN — AMLODIPINE BESYLATE 10 MILLIGRAM(S): 10 TABLET ORAL at 05:35

## 2024-08-28 RX ADMIN — Medication 40 MILLIGRAM(S): at 21:34

## 2024-08-28 RX ADMIN — INSULIN GLARGINE 10 UNIT(S): 100 INJECTION, SOLUTION SUBCUTANEOUS at 21:34

## 2024-08-28 RX ADMIN — TAMSULOSIN HYDROCHLORIDE 0.4 MILLIGRAM(S): 0.4 CAPSULE ORAL at 21:34

## 2024-08-28 RX ADMIN — NIFEDIPINE 60 MILLIGRAM(S): 60 TABLET, FILM COATED, EXTENDED RELEASE ORAL at 15:20

## 2024-08-28 RX ADMIN — Medication 3: at 16:07

## 2024-08-28 RX ADMIN — Medication 25 GRAM(S): at 15:57

## 2024-08-28 RX ADMIN — Medication 2 UNIT(S): at 11:54

## 2024-08-28 NOTE — DISCHARGE NOTE PROVIDER - ATTENDING DISCHARGE PHYSICAL EXAMINATION:
Right femoral access site is clean, dry, and intact with no hematoma or tenderness.    Throughout his hospitalization, Mr. Reyes has had uncontrolled hypertension. His medication was adjusted, but his SBP remain 160-170s. Patient does not measure his BP at home, and he does not have a cardiologist. Aldosterone and renin activity are pending, renal artery Duplex obtained but yet to be read. I discussed the risk of stroke, MI, kidney damage, and vision impairment with the patient, who refuses to stay in the hospital and threatened to sign AMA. I also spoke with his wife and daughter regarding the risks of uncontrolled HTN. Patient was given UpToDate and [employer] handouts re: HTN in [place]. He was counseled on reasons to present to the ED.

## 2024-08-28 NOTE — PROGRESS NOTE ADULT - NS ATTEND AMEND GEN_ALL_CORE FT
84yoM with HTN, HLD, DM, and BPH s/p TURP with recurrent UTIs who was sent to the ED for PPM placement. Patient has been lethargic, dizzy, and off balance for 2 weeks. He was noted to have bradycardia, and therefore referred to EP. ECG showed sinus bradycardia. In the ED, his ECG demonstrates junctional rhythm.     Of note, patient was seen at [facility] for "chest pain," though on further questioning, patient states it was back pain.     Plan:   - Place pacer pads  - Atropine at bedside  - TSH and FT4 pending  - Troponin 40 --> 47  - TTE with normal LVEF and no WMA  - Continue enalapril 20 mg daily   - Resume amlodipine 10 mg daily   - Hydralazine 25 mg q8h for elevated BP  - NPO   - PPM today  - Will reassess BP after PPM placement
Patient seen and evaluated by me on 8/28/24.    84yoM with HTN, HLD, DM, and BPH s/p TURP with recurrent UTIs who was sent to the ED for PPM placement. Patient has been lethargic, dizzy, and off balance for 2 weeks. He was noted to have bradycardia, and therefore referred to EP. ECG showed sinus bradycardia. In the ED, his ECG demonstrates junctional rhythm. He is s/p dual chamber leadless PPM (AVIER) placement on 8/27/24.     His hospitalization has been c/b uncontrolled HTN. Patient does not measure his BP at home.     Plan:   - TSH and FT4 wnl  - Troponin 40 --> 47  - TTE with normal LVEF and no WMA  - Enalapril and amlodipine discontinued  - Started on valsartan 160 mg and nifedipine 60 mg daily   - s/p collection of renin and plasma activity  - Start spironolactone 25 mg daily   - On hydralazine 10 mg q8h for elevated BP, will discontinue  - Ordered for renal artery Duplex  - Will adjust insulin dosing
Covering Dr. Duke  complex patient  s/p Leadless pacemaker implant  No complications  Stable for discharge

## 2024-08-28 NOTE — DISCHARGE NOTE PROVIDER - NSDCMRMEDTOKEN_GEN_ALL_CORE_FT
amLODIPine 10 mg oral tablet: 1 tab(s) orally once a day  atorvastatin 40 mg oral tablet: 1 tab(s) orally once a day (at bedtime)  enalapril 20 mg oral tablet: 1 tab(s) orally once a day  finasteride 5 mg oral tablet: 1 tab(s) orally once a day  glipiZIDE 10 mg oral tablet: 1 tab(s) orally once a day  Januvia 100 mg oral tablet: 1 tab(s) orally once a day  Lantus 100 units/mL subcutaneous solution: 10 unit(s) subcutaneous once a day (at bedtime)  metFORMIN 1000 mg oral tablet: 1 tab(s) orally 2 times a day  Percocet 7.5 mg-325 mg oral tablet: 1 tab(s) orally once a day as needed for  moderate pain  tamsulosin 0.4 mg oral capsule: 1 cap(s) orally once a day   atorvastatin 40 mg oral tablet: 1 tab(s) orally once a day (at bedtime)  finasteride 5 mg oral tablet: 1 tab(s) orally once a day  glipiZIDE 10 mg oral tablet: 1 tab(s) orally once a day  hydroCHLOROthiazide 25 mg oral tablet: 1 tab(s) orally once a day  Januvia 100 mg oral tablet: 1 tab(s) orally once a day  Lantus 100 units/mL subcutaneous solution: 10 unit(s) subcutaneous once a day (at bedtime)  metFORMIN 1000 mg oral tablet: 1 tab(s) orally 2 times a day  NIFEdipine 60 mg oral tablet, extended release: 1 tab(s) orally once a day  Percocet 7.5 mg-325 mg oral tablet: 1 tab(s) orally once a day as needed for  moderate pain  spironolactone 25 mg oral tablet: 1 tab(s) orally once a day  tamsulosin 0.4 mg oral capsule: 1 cap(s) orally once a day  valsartan 80 mg oral tablet: 3 tab(s) orally once a day   atorvastatin 40 mg oral tablet: 1 tab(s) orally once a day (at bedtime)  finasteride 5 mg oral tablet: 1 tab(s) orally once a day  glipiZIDE 10 mg oral tablet: 1 tab(s) orally once a day  hydroCHLOROthiazide 25 mg oral tablet: 1 tab(s) orally once a day  Januvia 100 mg oral tablet: 1 tab(s) orally once a day  Lantus 100 units/mL subcutaneous solution: 10 unit(s) subcutaneous once a day (at bedtime)  metFORMIN 1000 mg oral tablet: 1 tab(s) orally 2 times a day  NIFEdipine 60 mg oral tablet, extended release: 1 tab(s) orally once a day  Percocet 7.5 mg-325 mg oral tablet: 1 tab(s) orally once a day as needed for  moderate pain  spironolactone 25 mg oral tablet: 1 tab(s) orally once a day  tamsulosin 0.4 mg oral capsule: 1 cap(s) orally once a day  valsartan 160 mg oral tablet: 1 tab(s) orally once a day  valsartan 80 mg oral tablet: 1 tab(s) orally once a day   atorvastatin 40 mg oral tablet: 1 tab(s) orally once a day (at bedtime)  finasteride 5 mg oral tablet: 1 tab(s) orally once a day  glipiZIDE 10 mg oral tablet: 1 tab(s) orally once a day  hydroCHLOROthiazide 25 mg oral tablet: 1 tab(s) orally once a day  Januvia 100 mg oral tablet: 1 tab(s) orally once a day  Lantus 100 units/mL subcutaneous solution: 10 unit(s) subcutaneous once a day (at bedtime)  metFORMIN 1000 mg oral tablet: 1 tab(s) orally 2 times a day  NIFEdipine (Eqv-Adalat CC) 60 mg oral tablet, extended release: 1 tab(s) orally 2 times a day  Percocet 7.5 mg-325 mg oral tablet: 1 tab(s) orally once a day as needed for  moderate pain  spironolactone 25 mg oral tablet: 1 tab(s) orally once a day  tamsulosin 0.4 mg oral capsule: 1 cap(s) orally once a day  valsartan 160 mg oral tablet: 1 tab(s) orally once a day  valsartan 80 mg oral tablet: 1 tab(s) orally once a day

## 2024-08-28 NOTE — PHYSICAL THERAPY INITIAL EVALUATION ADULT - GENERAL OBSERVATIONS, REHAB EVAL
1100 - 1130 Pt rec semifowler in bed with +tele, in NAD. Pt presents deconditioned and benefits from one person assist with mobility training at this time.

## 2024-08-28 NOTE — PROGRESS NOTE ADULT - SUBJECTIVE AND OBJECTIVE BOX
After Visit Summary   9/14/2017    Mya Hui    MRN: [id number]           Patient Information     Date Of Birth          1941        Visit Information        Provider Department      9/14/2017 3:15 PM Chanell Nelson APRN CNP [facility]        Today's Diagnoses     Gastroesophageal reflux disease, esophagitis presence not specified    -  1    Type 2 diabetes mellitus without complication, without long-term current use of insulin (H)        Hypokalemia        Dehydration        Leukocytosis, unspecified type        Diarrhea, unspecified type           Follow-ups after your visit        Your next 10 appointments already scheduled     Oct 09, 2017  1:00 PM CDT   Return Visit with Annamaria Carpio MD   [facility] ([facility])    [address] [address] [phone]              Future tests that were ordered for you today     Open Future Orders        Priority Expected Expires Ordered    Clostridium difficile Toxin B PCR Routine  10/14/2017 9/14/2017            Who to contact     If you have questions or need follow up information about today's clinic visit or your schedule please contact [facility] directly at [phone].  Normal or non-critical lab and imaging results will be communicated to you by MyChart, letter or phone within 4 business days after the clinic has received the results. If you do not hear from us within 7 days, please contact the clinic through [employer] or phone. If you have a critical or abnormal lab result, we will notify you by phone as soon as possible.  Submit refill requests through [employer] or call your pharmacy and they will forward the refill request to us. Please allow 3 business days for your refill to be completed.          Additional Information About Your Visit        [employer] Information     PHRQL lets you send messages to your doctor, view your test results, renew  Chief complaint: Patient is a 84y old  Male who presents with a chief complaint of Bradycardia (26 Aug 2024 15:02)    Hospital Course: Patient is a 84 year old male with PMH HTN, HLD, DM, BPH, Recurrent UTIs, turbt who was sent to the ed by Dr. Duke for ppm placement. Patient had recent admission for Left flank pain and arm pain and was noted to be bradycardic. For the past two weeks patient has been feeling lethargic, off balance, and dizzy. Daughter said that he has also been SOB with exertion and upon rest. Pt denies chest pain, palpitations, syncope, diaphoresis.    Of note patient went to [facility] with chest pain; he was told to follow up with a cardiologist.   In the Ed, Vs 176/78, hr 45 temp 98.5, k 4.8, cr 1.4, glucose 220, a1c 9.3, EKG junctional rhythm hr 48bpm.     Interval history: Pt was seen and examined at bedside today in NAD. Pt denies chest pain, palpitations, SOB. hypertensive. PT eval for deconditioning. Refusing all services for dispo.     Review of systems: A complete 10-point review of systems was obtained and is negative except as stated in the interval history.    Vitals:  ICU Vital Signs Last 24 Hrs  T(C): 36.7 (28 Aug 2024 07:07), Max: 37.1 (27 Aug 2024 16:23)  T(F): 98.1 (28 Aug 2024 07:07), Max: 98.8 (27 Aug 2024 16:23)  HR: 84 (28 Aug 2024 11:30) (59 - 84)  BP: 191/80 (28 Aug 2024 11:30) (161/74 - 207/83)  BP(mean): 126 (28 Aug 2024 09:39) (106 - 126)  RR: 31 (28 Aug 2024 09:39) (17 - 31)  SpO2: 95% (28 Aug 2024 11:30) (91% - 97%)    O2 Parameters below as of 28 Aug 2024 11:30  Patient On (Oxygen Delivery Method): room air        Ins & outs:      @ 07:01  -   @ 07:00  --------------------------------------------------------  IN: 0 mL / OUT: 450 mL / NET: -450 mL    Weight trend:  Weight (kg): 73.6 ()    Physical exam:  GENERAL: NAD, lying in bed comfortably   HEAD:  Atraumatic, normocephalic  EYES: EOMI, PERRLA, conjunctiva and sclera clear  ENT: Moist mucous membranes  NECK: Supple, no JVD  HEART: Regular rate and rhythm, no murmurs, rubs, or gallops  LUNGS: Unlabored respirations.  Clear to auscultation bilaterally, no crackles, wheezing, or rhonchi  ABDOMEN: Soft, nontender, nondistended, +BS  EXTREMITIES: 2+ peripheral pulses bilaterally. No clubbing, cyanosis, or edema  NERVOUS SYSTEM:  A&Ox3, no focal deficits   SKIN: No rashes or lesions    Data reviewed:  - Telemetry: Sinus bradycardia 38-50bpm     - EC24: EKG junctional rhythm hr 48bpm.      -Echo: 24  1. Limited study.   2. Normal global left ventricular systolic function.   3. Left ventricular ejection fraction, by visual estimation, is 50 to   55%.    -Radiology:   Chest x-ray 24: No pneumothorax no infiltrates positive cardiomegaly no free air.        - Labs:                        13.1   9.25  )-----------( 222      ( 27 Aug 2024 05:01 )             40.9         144  |  109  |  19  ----------------------------<  176<H>  3.8   |  23  |  1.3    Ca    9.0      27 Aug 2024 05:01  Mg     1.9         TPro  6.2  /  Alb  3.7  /  TBili  0.5  /  DBili  x   /  AST  14  /  ALT  15  /  AlkPhos  66      LIVER FUNCTIONS - ( 27 Aug 2024 05:01 )  Alb: 3.7 g/dL / Pro: 6.2 g/dL / ALK PHOS: 66 U/L / ALT: 15 U/L / AST: 14 U/L / GGT: x           PT/INR - ( 27 Aug 2024 05:01 )   PT: 11.30 sec;   INR: 0.99 ratio         PTT - ( 27 Aug 2024 05:01 )  PTT:28.7 sec          Lactate Trend    Urinalysis Basic - ( 27 Aug 2024 05:01 )    Color: x / Appearance: x / SG: x / pH: x  Gluc: 176 mg/dL / Ketone: x  / Bili: x / Urobili: x   Blood: x / Protein: x / Nitrite: x   Leuk Esterase: x / RBC: x / WBC x   Sq Epi: x / Non Sq Epi: x / Bacteria: x      Medications:  MEDICATIONS  (STANDING):  atorvastatin 40 milliGRAM(s) Oral at bedtime  dextrose 5%. 1000 milliLiter(s) (50 mL/Hr) IV Continuous <Continuous>  dextrose 5%. 1000 milliLiter(s) (100 mL/Hr) IV Continuous <Continuous>  dextrose 50% Injectable 12.5 Gram(s) IV Push once  dextrose 50% Injectable 25 Gram(s) IV Push once  dextrose 50% Injectable 25 Gram(s) IV Push once  finasteride 5 milliGRAM(s) Oral daily  glucagon  Injectable 1 milliGRAM(s) IntraMuscular once  hydrALAZINE 10 milliGRAM(s) Oral every 8 hours  insulin glargine Injectable (LANTUS) 10 Unit(s) SubCutaneous at bedtime  insulin lispro (ADMELOG) corrective regimen sliding scale   SubCutaneous three times a day before meals  spironolactone 25 milliGRAM(s) Oral daily  tamsulosin 0.4 milliGRAM(s) Oral at bedtime    MEDICATIONS  (PRN):  acetaminophen     Tablet .. 650 milliGRAM(s) Oral every 8 hours PRN Moderate Pain (4 - 6)  dextrose Oral Gel 15 Gram(s) Oral once PRN Blood Glucose LESS THAN 70 milliGRAM(s)/deciliter  oxycodone    5 mG/acetaminophen 325 mG 1 Tablet(s) Oral every 6 hours PRN Severe Pain (7 - 10)      Allergies    No Known Allergies    Intolerances                   "your prescriptions, schedule appointments and more. To sign up, go to [email or web address] . Click on \"Log in\" on the left side of the screen, which will take you to the Welcome page. Then click on \"Sign up Now\" on the right side of the page.     You will be asked to enter the access code listed below, as well as some personal information. Please follow the directions to create your username and password.     Your access code is: [email or web address]  Expires: 2017 11:01 AM     Your access code will  in 90 days. If you need help or a new code, please call your [place] clinic or [phone].        Care EveryWhere ID     This is your Care EveryWhere ID. This could be used by other organizations to access your [facility] medical records  [id number]        Your Vitals Were     Pulse Temperature BMI (Body Mass Index)             60 98.2  F (36.8  C) (Tympanic) 32.78 kg/m2          Blood Pressure from Last 3 Encounters:   17 136/80   17 133/71   17 153/83    Weight from Last 3 Encounters:   17 197 lb (89.4 kg)   09/10/17 199 lb 8.3 oz (90.5 kg)   17 203 lb 14.8 oz (92.5 kg)              We Performed the Following     Basic metabolic panel  (Ca, Cl, CO2, Creat, Gluc, K, Na, BUN)     CBC with platelets and differential     CRP, inflammation          Today's Medication Changes          These changes are accurate as of: 17  4:16 PM.  If you have any questions, ask your nurse or doctor.               These medicines have changed or have updated prescriptions.        Dose/Directions    pantoprazole 40 MG EC tablet   Commonly known as:  PROTONIX   This may have changed:    - when to take this  - additional instructions   Used for:  Gastroesophageal reflux disease, esophagitis presence not specified   Changed by:  Chanell Nelson, MAYI CNP        Dose:  40 mg   Take 1 tablet (40 mg) by mouth 2 times daily Take 30-60 minutes before a meal.   Quantity:  180 tablet   Refills:  1       "      Where to get your medicines      These medications were sent to [facility] [place], [place] - [address] [facility] [address]     Phone:  [phone]     pantoprazole 40 MG EC tablet                Primary Care Provider Office Phone # Fax #    MAYI Anthony [phone]       [address]   [place] [address]        Equal Access to Services     JANETTE RIBEIRO : Hadii aad ku hadasho Soomaali, waaxda luqadaha, qaybta kaalmada adeegyada, waxay idiin hayaan adeeg kharash laqian . So [facility] [phone].    ATENCIÓN: Si habla español, tiene a pedro disposición servicios gratuitos de asistencia lingüística. Shravan al [phone].    We comply with applicable federal civil rights laws and [place] laws. We do not discriminate on the basis of race, color, national origin, age, disability sex, sexual orientation or gender identity.            Thank you!     Thank you for choosing [facility]  for your care. Our goal is always to provide you with excellent care. Hearing back from our patients is one way we can continue to improve our services. Please take a few minutes to complete the written survey that you may receive in the mail after your visit with us. Thank you!             Your Updated Medication List - Protect others around you: Learn how to safely use, store and throw away your medicines at www.disposemymeds.org.          This list is accurate as of: 9/14/17  4:16 PM.  Always use your most recent med list.                   Brand Name Dispense Instructions for use Diagnosis    acetaminophen 325 MG tablet    TYLENOL    100 tablet    Take 2 tablets (650 mg) by mouth every 4 hours as needed for mild pain        alum & mag hydroxide-simethicone 400-400-40 MG/5ML Susp suspension    MYLANTA ES/MAALOX  ES     Take 30 mLs by mouth every 4 hours as needed for indigestion        amitriptyline 25 MG tablet    ELAVIL    90 tablet    Take 1  tablet (25 mg) by mouth At Bedtime    Insomnia, unspecified       aspirin 81 MG chewable tablet     100 tablet    Take 1 tablet (81 mg) by mouth daily    Type 2 diabetes mellitus without complication, without long-term current use of insulin (H)       blood glucose lancets standard    no brand specified    1 Box    Glucose test strips Use to test blood sugar 1 times daily or as directed.    Type 2 diabetes mellitus without complication, without long-term current use of insulin (H)       blood glucose monitoring test strip    no brand specified    100 strip    Use to test blood sugars 1 times daily or as directed    Type 2 diabetes mellitus without complication, without long-term current use of insulin (H)       diltiazem 120 MG 24 hr capsule    CARDIZEM CD/CARTIA XT    90 capsule    Take 1 capsule (120 mg) by mouth daily        levothyroxine 50 MCG tablet    SYNTHROID/LEVOTHROID    90 tablet    Take 1 tablet (50 mcg) by mouth daily    Hypothyroidism, unspecified type       lisinopril-hydrochlorothiazide 20-12.5 MG per tablet    PRINZIDE/ZESTORETIC    60 tablet    Take 1 tablet by mouth 2 times daily Do not restart until 9/12/17    Type 2 diabetes mellitus without complication, without long-term current use of insulin (H)       metFORMIN 500 MG 24 hr tablet    GLUCOPHAGE-XR    270 tablet    ONE TABLET BY MOUTH EVERY MORNING AND TAKE TWO TABLETS BY MOUTH EVERY EVENING WITH MEALS    Type 2 diabetes mellitus without complication, without long-term current use of insulin (H)       MULTIVITAL PO      Take  by mouth. daily        omeprazole 20 MG CR capsule    priLOSEC    30 capsule    Take 1 capsule (20 mg) by mouth every evening    Gastroesophageal reflux disease, esophagitis presence not specified       oxybutynin 5 MG 24 hr tablet    DITROPAN-XL     Take by mouth daily        pantoprazole 40 MG EC tablet    PROTONIX    180 tablet    Take 1 tablet (40 mg) by mouth 2 times daily Take 30-60 minutes before a meal.     Gastroesophageal reflux disease, esophagitis presence not specified       potassium chloride 10 MEQ tablet    K-TAB,KLOR-CON    90 tablet    Take 1 tablet (10 mEq) by mouth 3 times daily    Hypokalemia       pravastatin 40 MG tablet    PRAVACHOL    90 tablet    TAKE ONE TABLET BY MOUTH ONCE DAILY    Hyperlipidemia with target LDL less than 100

## 2024-08-28 NOTE — DISCHARGE NOTE PROVIDER - CARE PROVIDER_API CALL
Juliette Purvis NP in Primary Care Adult-Gerontology  [address] [place] [address]  Phone: [phone]  Fax: [phone]  Scheduled Appointment: 09/11/2024 03:00 PM

## 2024-08-28 NOTE — PHYSICAL THERAPY INITIAL EVALUATION ADULT - GAIT TRAINING, PT EVAL
by discharge: Pt will amb 150 ft with supervision using RW as needed and negotiate 8 steps with supervision using handrail.

## 2024-08-28 NOTE — PHYSICAL THERAPY INITIAL EVALUATION ADULT - GAIT DEVIATIONS NOTED, PT EVAL
Pt presents deconditoned. Decreased speed, forward flexed posture, decreased step length/height, intermittent unsteadiness - benefits from RW and PT min assist as needed for safety and fall prevention./decreased morris/decreased step length/decreased stride length

## 2024-08-28 NOTE — PHYSICAL THERAPY INITIAL EVALUATION ADULT - PERTINENT HX OF CURRENT PROBLEM, REHAB EVAL
Patient is a 84 year old male with PMH HTN, HLD, DM, BPH, Recurrent UTIs, turbt who was sent to the ed by Dr. Duke for ppm placement. Patient had recent admission for Left flank pain and arm pain and was noted to be bradycardic. For the past two weeks patient has been feeling lethargic, off balance, and dizzy. Daughter said that he has also been SOB with exertion and upon rest. Pt denies chest pain, palpitations, syncope, diaphoresis. Pt is now s/p leadless PPM implant.

## 2024-08-28 NOTE — DISCHARGE NOTE PROVIDER - NSDCFUSCHEDAPPT_GEN_ALL_CORE_FT
Juliette Purvis  [facility] Physician [facility]  ELECTROPH [address]  Scheduled Appointment: 09/11/2024    Uziel Hankins  [facility]  UROLOGY [address]  Scheduled Appointment: 09/17/2024

## 2024-08-28 NOTE — PHYSICAL THERAPY INITIAL EVALUATION ADULT - LEVEL OF INDEPENDENCE: STAIR NEGOTIATION, REHAB EVAL
Held as pt BP noted 218/95 after gait training.  and TERA Rueda in room notified and aware. BP taken again a few minutes later, 191/80

## 2024-08-28 NOTE — PHYSICAL THERAPY INITIAL EVALUATION ADULT - LEVEL OF INDEPENDENCE: SIT/STAND, REHAB EVAL
Mild unsteadiness with posterior loss of balance upon standing, recovered with PT min assist./minimum assist (75% patients effort)

## 2024-08-28 NOTE — PROGRESS NOTE ADULT - ASSESSMENT
A/P   Patient  s/p DC Aveir    - CXR shows device is in good position  - Device interrogation done.  No events.  Device is functioning properly  - Pt may shower today  - no bath/swimming x 1 week  - ok to discharge home today AFTER stitch is removed  - Follow up in 1 month  with EP

## 2024-08-28 NOTE — DISCHARGE NOTE PROVIDER - NSDCCPCAREPLAN_GEN_ALL_CORE_FT
Spoke to patient and scheduled for same day appointment   PRINCIPAL DISCHARGE DIAGNOSIS  Diagnosis: Cardiac arrhythmia  Assessment and Plan of Treatment:       SECONDARY DISCHARGE DIAGNOSES  Diagnosis: Bradycardia  Assessment and Plan of Treatment:

## 2024-08-28 NOTE — PHYSICAL THERAPY INITIAL EVALUATION ADULT - ADDITIONAL COMMENTS
Pt reports he lives with spouse in pvt home with flight of stairs indoors, ambulated independently using SC/Rollator as needed prior to admission.

## 2024-08-28 NOTE — DISCHARGE NOTE PROVIDER - HOSPITAL COURSE
Patient is a 84y Male with PMHx of HTN, HLD, DM, BPH, Recurrent UTIs, turbt who was sent to the ed by Dr. Duke for ppm placement. On 8/27 patient underwent successful Leadless PPM implant (STJ DC Aveir). Patient was monitored overnight. On POD 1 patient remains HD stable with no complaints. Examination of ppm pocket is C/D/I with no hematoma or erythema. Device interrogation reveals normal device function and CXR was negative for pneumothorax. Patient is being DC home in stable condition. Patient is a 84y Male with PMHx of HTN, HLD, DM, BPH, Recurrent UTIs, turbt who was sent to the ed by Dr. Duke for ppm placement. On 8/27 patient underwent successful Leadless PPM implant (STJ DC Aveir). Patient was monitored overnight. On POD 1, patient remains HD stable with no complaints. Examination of groin is C/D/I with no hematoma or swelling. Patient hypertensive during this admission and discharge medications will include hydrochlorothiazide 25mg, spirolactone 25mg, nifedipine XL 60mg, and valsartan 240mg daily.    Duplex of renal arteries done and pending read. Patient will f/u outpatient. Renin and aldosterone labs collecting and pending. Device interrogation reveals normal device function and CXR was negative for pneumothorax. Patient is being DC home in stable condition. Patient is a 84y Male with PMHx of HTN, HLD, DM, BPH, Recurrent UTIs, turbt who was sent to the ed by Dr. Duke for ppm placement. On 8/27 patient underwent successful Leadless PPM implant (STJ DC Aveir). Patient was monitored overnight. On POD 1, patient remains HD stable with no complaints. Examination of groin is C/D/I with no hematoma or swelling. Patient hypertensive during this admission and discharge medications will include hydrochlorothiazide 25mg, spirolactone 25mg, nifedipine XL 60mg, and valsartan 240mg daily.    Duplex of renal arteries done and pending read. Patient will f/u outpatient. Renin and aldosterone labs collecting and pending. Device interrogation reveals normal device function and CXR was negative for pneumothorax. Patient is leaving against medical advice as BP is unstable. Patient and family members have been advised on the risks associated with severely uncontrolled blood pressure. Patient is a 84y Male with PMHx of HTN, HLD, DM, BPH, Recurrent UTIs, turbt who was sent to the ed by Dr. Duke for ppm placement. On 8/27 patient underwent successful Leadless PPM implant (STJ DC Aveir). Patient was monitored overnight. On POD 1, patient remains HD stable with no complaints. Examination of groin is C/D/I with no hematoma or swelling. Patient hypertensive during this admission and discharge medications will include hydrochlorothiazide 25mg, spirolactone 25mg, nifedipine XL 60mg BID, and valsartan 240mg daily.  Duplex of renal arteries done and pending read. Patient will f/u outpatient. Renin and aldosterone labs collecting and pending. Device interrogation reveals normal device function and CXR was negative for pneumothorax. Patient is leaving against medical advice as BP is unstable. Patient and family members have been advised on the risks associated with severely uncontrolled blood pressure.

## 2024-08-28 NOTE — PROGRESS NOTE ADULT - SUBJECTIVE AND OBJECTIVE BOX
INTERVAL HPI/OVERNIGHT EVENTS:    Patient s/p DC [facility]  No events over night. Pt without complaints    MEDICATIONS  (STANDING):  amLODIPine   Tablet 10 milliGRAM(s) Oral daily  atorvastatin 40 milliGRAM(s) Oral at bedtime  dextrose 5%. 1000 milliLiter(s) (100 mL/Hr) IV Continuous <Continuous>  dextrose 5%. 1000 milliLiter(s) (50 mL/Hr) IV Continuous <Continuous>  dextrose 50% Injectable 25 Gram(s) IV Push once  dextrose 50% Injectable 25 Gram(s) IV Push once  dextrose 50% Injectable 12.5 Gram(s) IV Push once  finasteride 5 milliGRAM(s) Oral daily  glucagon  Injectable 1 milliGRAM(s) IntraMuscular once  hydrALAZINE 10 milliGRAM(s) Oral every 8 hours  insulin glargine Injectable (LANTUS) 8 Unit(s) SubCutaneous at bedtime  insulin lispro (ADMELOG) corrective regimen sliding scale   SubCutaneous three times a day before meals  tamsulosin 0.4 milliGRAM(s) Oral at bedtime  valsartan 160 milliGRAM(s) Oral daily  vancomycin  IVPB 1000 milliGRAM(s) IV Intermittent every 12 hours    MEDICATIONS  (PRN):  acetaminophen     Tablet .. 650 milliGRAM(s) Oral every 8 hours PRN Moderate Pain (4 - 6)  dextrose Oral Gel 15 Gram(s) Oral once PRN Blood Glucose LESS THAN 70 milliGRAM(s)/deciliter  oxycodone    5 mG/acetaminophen 325 mG 1 Tablet(s) Oral every 6 hours PRN Severe Pain (7 - 10)      Allergies    No Known Allergies    Intolerances          Vital Signs Last 24 Hrs  T(C): 36.7 (28 Aug 2024 07:07), Max: 37.1 (27 Aug 2024 16:23)  T(F): 98.1 (28 Aug 2024 07:07), Max: 98.8 (27 Aug 2024 16:23)  HR: 71 (28 Aug 2024 07:07) (41 - 76)  BP: 177/87 (28 Aug 2024 07:07) (161/74 - 220/79)  BP(mean): 124 (28 Aug 2024 07:07) (106 - 134)  RR: 20 (28 Aug 2024 07:07) (17 - 23)  SpO2: 91% (28 Aug 2024 07:07) (91% - 97%)    Parameters below as of 28 Aug 2024 07:07  Patient On (Oxygen Delivery Method): room air        Wound healing well; No hematoma; no bleeding      RADIOLOGY & ADDITIONAL TESTS:      A/P   Patient  s/p ICD/ PPM/ BiV- ICD implant    - CXR shows leads in good position.  No ptx  - Device interrogation done.  No events.  Device is functioning properly  - Keflex 500 mg PO q12 h x 5 days  Bactrim DS q12h x 5 days  - Pt cannot raise left arm above shoulder level x 6 weeks  - Pt cannot lift >5 pounds with left arm x 6 weeks  - no shower x 1 week  - no bath/swimming x 6 weeks  - ok to discharge home today  - Follow up in 1 month  week with EP   INTERVAL HPI/OVERNIGHT EVENTS:    Patient s/p DC [facility]  No events over night. Pt without complaints    MEDICATIONS  (STANDING):  amLODIPine   Tablet 10 milliGRAM(s) Oral daily  atorvastatin 40 milliGRAM(s) Oral at bedtime  dextrose 5%. 1000 milliLiter(s) (100 mL/Hr) IV Continuous <Continuous>  dextrose 5%. 1000 milliLiter(s) (50 mL/Hr) IV Continuous <Continuous>  dextrose 50% Injectable 25 Gram(s) IV Push once  dextrose 50% Injectable 25 Gram(s) IV Push once  dextrose 50% Injectable 12.5 Gram(s) IV Push once  finasteride 5 milliGRAM(s) Oral daily  glucagon  Injectable 1 milliGRAM(s) IntraMuscular once  hydrALAZINE 10 milliGRAM(s) Oral every 8 hours  insulin glargine Injectable (LANTUS) 8 Unit(s) SubCutaneous at bedtime  insulin lispro (ADMELOG) corrective regimen sliding scale   SubCutaneous three times a day before meals  tamsulosin 0.4 milliGRAM(s) Oral at bedtime  valsartan 160 milliGRAM(s) Oral daily  vancomycin  IVPB 1000 milliGRAM(s) IV Intermittent every 12 hours    MEDICATIONS  (PRN):  acetaminophen     Tablet .. 650 milliGRAM(s) Oral every 8 hours PRN Moderate Pain (4 - 6)  dextrose Oral Gel 15 Gram(s) Oral once PRN Blood Glucose LESS THAN 70 milliGRAM(s)/deciliter  oxycodone    5 mG/acetaminophen 325 mG 1 Tablet(s) Oral every 6 hours PRN Severe Pain (7 - 10)      Allergies    No Known Allergies    Intolerances          Vital Signs Last 24 Hrs  T(C): 36.7 (28 Aug 2024 07:07), Max: 37.1 (27 Aug 2024 16:23)  T(F): 98.1 (28 Aug 2024 07:07), Max: 98.8 (27 Aug 2024 16:23)  HR: 71 (28 Aug 2024 07:07) (41 - 76)  BP: 177/87 (28 Aug 2024 07:07) (161/74 - 220/79)  BP(mean): 124 (28 Aug 2024 07:07) (106 - 134)  RR: 20 (28 Aug 2024 07:07) (17 - 23)  SpO2: 91% (28 Aug 2024 07:07) (91% - 97%)    Parameters below as of 28 Aug 2024 07:07  Patient On (Oxygen Delivery Method): room air        Wound healing well; No hematoma; no bleeding      RADIOLOGY & ADDITIONAL TESTS:  chest< from: Xray Chest 2 Views PA/Lat (08.28.24 @ 07:50) >  Findings/  Impression:    Support devices: Leadless pacemaker projecting over the right ventricle.    Cardiac/mediastinum/hilum: Stable cardiomediastinal silhouette.    Lung parenchyma/Pleura: Low lung volumes. Mild diffuse interstitial   prominence could reflect mild edema. No pneumothorax or pleural effusion.    Skeleton/soft tissues:  No radiographically evident acute displaced   fracture within the limitations of this exam.    < end of copied text >

## 2024-08-28 NOTE — DISCHARGE NOTE PROVIDER - NSDCCPTREATMENT_GEN_ALL_CORE_FT
PRINCIPAL PROCEDURE  Procedure: Insertion or repositioning of leadless intracardiac pacemaker  Findings and Treatment:   - Do not shower for 5 days   - Do not drive or operate heavy machinery for 1 week   - Do not submerge in water (example: baths, swimming) for 1 month.  - Do not lift your left arm greater than shoulder height for 6 weeks.  - Do not lift anything heavier than 5-10 lbs with your left arm for 6 weeks.  - Any sudden swelling, redness, fever, discharge, or severe pain, call the Electrophysiology Office at [phone].  - Fu with PHOENIX Purvis on 9/11 3pm

## 2024-08-28 NOTE — PROGRESS NOTE ADULT - ASSESSMENT
Assessment   Patient is a 84 year old male with PMH HTN, HLD, DM, BPH, Recurrent UTIs, turbt who was sent to the ed by Dr. Duke for ppm placement. Pt was hypertensive with BP in the 200s. EKG junctional rhythm hr 48bpm.     Plan:   #Junctional rhythm  -monitor on tele   -K>4 and M>2  -PPM with Dr. Duke   -Troponin: 40-->47  -TSH and T4 pending     #HTN  -c/w Enalapril 20mg QD  -c/w amlodipine 10mg QD   - s/p Hydralazine 25 x2 ON   - c/w Hydralazine 25mg TID for better bp control       #HLD  -c/w Atorvastatin 40mg QD  -Cholesterol: 169    LDL: 102     #BPH  -c/w Tamsulosin 0.4 mg QD  -c/w Finasteride 5mg QD     #DM  -Hold home meds (Glipizide 10mg QD, Januvia 100mg QD, Metformin 1000 BID)   -ISS   -A1c pending     #Pain   -Percocet 7.5-325 home med     DVT Prophylaxis: Heparin q12  GI: Protonix 40mg QD  Assessment   Patient is a 84 year old male with PMH HTN, HLD, DM, BPH, Recurrent UTIs, turbt who was sent to the ed by Dr. Duke for ppm placement. Pt was hypertensive with BP in the 200s. EKG junctional rhythm hr 48bpm. Patient s/p DC [facility]ier on 8/29.    Plan:   #Junctional rhythm  -monitor on tele   -K>4 and M>2  -PPM with Dr. Duke   -Troponin: 40-->47  -TSH and T4 WNL    #HTN  -d/c Enalapril 20mg QD  - starting valsartan 240mg today  -changed amlodipine 10mg QD to Nifedipine XL 90mg starting tomorrow  - D/c Hydralazine 25   - starting spirolactone 25mg today   - f/u renin and aldosterone  - f/u duplex renal arteries     #HLD  -c/w Atorvastatin 40mg QD  -Cholesterol: 169  - LDL: 102     #BPH  -c/w Tamsulosin 0.4 mg QD  -c/w Finasteride 5mg QD     #DM  -Hold home meds (Glipizide 10mg QD, Januvia 100mg QD, Metformin 1000 BID)   -ISS   -A1c pending     #Pain   -Percocet 7.5-325 home med     DVT Prophylaxis: Heparin q12  GI: Protonix 40mg QD   Dispo: Home w/ rolling walker (Refusing STR and home care services)

## 2024-08-29 ENCOUNTER — TRANSCRIPTION ENCOUNTER (OUTPATIENT)
Age: 84
End: 2024-08-29

## 2024-08-29 VITALS
HEART RATE: 89 BPM | SYSTOLIC BLOOD PRESSURE: 167 MMHG | RESPIRATION RATE: 20 BRPM | TEMPERATURE: 97 F | DIASTOLIC BLOOD PRESSURE: 88 MMHG

## 2024-08-29 LAB
ANION GAP SERPL CALC-SCNC: 10 MMOL/L — SIGNIFICANT CHANGE UP (ref 7–14)
BUN SERPL-MCNC: 16 MG/DL — SIGNIFICANT CHANGE UP (ref 10–20)
CALCIUM SERPL-MCNC: 8.4 MG/DL — SIGNIFICANT CHANGE UP (ref 8.4–10.5)
CHLORIDE SERPL-SCNC: 106 MMOL/L — SIGNIFICANT CHANGE UP (ref 98–110)
CO2 SERPL-SCNC: 21 MMOL/L — SIGNIFICANT CHANGE UP (ref 17–32)
CREAT SERPL-MCNC: 1.4 MG/DL — SIGNIFICANT CHANGE UP (ref 0.7–1.5)
EGFR: 50 ML/MIN/1.73M2 — LOW
GLUCOSE BLDC GLUCOMTR-MCNC: 257 MG/DL — HIGH (ref 70–99)
GLUCOSE BLDC GLUCOMTR-MCNC: 260 MG/DL — HIGH (ref 70–99)
GLUCOSE BLDC GLUCOMTR-MCNC: 262 MG/DL — HIGH (ref 70–99)
GLUCOSE SERPL-MCNC: 338 MG/DL — HIGH (ref 70–99)
HCT VFR BLD CALC: 39.3 % — LOW (ref 42–52)
HGB BLD-MCNC: 12.7 G/DL — LOW (ref 14–18)
MAGNESIUM SERPL-MCNC: 2.3 MG/DL — SIGNIFICANT CHANGE UP (ref 1.8–2.4)
MCHC RBC-ENTMCNC: 27.4 PG — SIGNIFICANT CHANGE UP (ref 27–31)
MCHC RBC-ENTMCNC: 32.3 G/DL — SIGNIFICANT CHANGE UP (ref 32–37)
MCV RBC AUTO: 84.7 FL — SIGNIFICANT CHANGE UP (ref 80–94)
NRBC # BLD: 0 /100 WBCS — SIGNIFICANT CHANGE UP (ref 0–0)
PLATELET # BLD AUTO: 195 K/UL — SIGNIFICANT CHANGE UP (ref 130–400)
PMV BLD: 11.8 FL — HIGH (ref 7.4–10.4)
POTASSIUM SERPL-MCNC: 3.9 MMOL/L — SIGNIFICANT CHANGE UP (ref 3.5–5)
POTASSIUM SERPL-SCNC: 3.9 MMOL/L — SIGNIFICANT CHANGE UP (ref 3.5–5)
RBC # BLD: 4.64 M/UL — LOW (ref 4.7–6.1)
RBC # FLD: 13.4 % — SIGNIFICANT CHANGE UP (ref 11.5–14.5)
SODIUM SERPL-SCNC: 137 MMOL/L — SIGNIFICANT CHANGE UP (ref 135–146)
WBC # BLD: 8.56 K/UL — SIGNIFICANT CHANGE UP (ref 4.8–10.8)
WBC # FLD AUTO: 8.56 K/UL — SIGNIFICANT CHANGE UP (ref 4.8–10.8)

## 2024-08-29 PROCEDURE — 93975 VASCULAR STUDY: CPT | Mod: 26

## 2024-08-29 PROCEDURE — 99239 HOSP IP/OBS DSCHRG MGMT >30: CPT

## 2024-08-29 RX ORDER — NIFEDIPINE 60 MG/1
1 TABLET, FILM COATED, EXTENDED RELEASE ORAL
Qty: 30 | Refills: 0
Start: 2024-08-29 | End: 2024-09-27

## 2024-08-29 RX ORDER — VALSARTAN 40 MG/1
1 TABLET ORAL
Qty: 30 | Refills: 0
Start: 2024-08-29 | End: 2024-09-27

## 2024-08-29 RX ORDER — HYDROCHLOROTHIAZIDE 12.5 MG/1
1 CAPSULE ORAL
Qty: 30 | Refills: 0
Start: 2024-08-29 | End: 2024-09-27

## 2024-08-29 RX ORDER — NIFEDIPINE 60 MG/1
30 TABLET, FILM COATED, EXTENDED RELEASE ORAL ONCE
Refills: 0 | Status: COMPLETED | OUTPATIENT
Start: 2024-08-29 | End: 2024-08-29

## 2024-08-29 RX ORDER — VALSARTAN 40 MG/1
3 TABLET ORAL
Qty: 90 | Refills: 0
Start: 2024-08-29 | End: 2024-09-27

## 2024-08-29 RX ORDER — SPIRONOLACTONE 25 MG/1
1 TABLET, FILM COATED ORAL
Qty: 30 | Refills: 0
Start: 2024-08-29 | End: 2024-09-27

## 2024-08-29 RX ORDER — NIFEDIPINE 60 MG/1
1 TABLET, FILM COATED, EXTENDED RELEASE ORAL
Qty: 60 | Refills: 0
Start: 2024-08-29 | End: 2024-09-27

## 2024-08-29 RX ADMIN — Medication 3: at 07:48

## 2024-08-29 RX ADMIN — VALSARTAN 240 MILLIGRAM(S): 40 TABLET ORAL at 05:23

## 2024-08-29 RX ADMIN — Medication 3: at 11:17

## 2024-08-29 RX ADMIN — Medication 3: at 17:11

## 2024-08-29 RX ADMIN — FINASTERIDE 5 MILLIGRAM(S): 1 TABLET, FILM COATED ORAL at 11:27

## 2024-08-29 RX ADMIN — NIFEDIPINE 60 MILLIGRAM(S): 60 TABLET, FILM COATED, EXTENDED RELEASE ORAL at 05:22

## 2024-08-29 RX ADMIN — NIFEDIPINE 30 MILLIGRAM(S): 60 TABLET, FILM COATED, EXTENDED RELEASE ORAL at 11:27

## 2024-08-29 RX ADMIN — SPIRONOLACTONE 25 MILLIGRAM(S): 25 TABLET, FILM COATED ORAL at 05:23

## 2024-08-29 NOTE — DISCHARGE NOTE NURSING/CASE MANAGEMENT/SOCIAL WORK - PATIENT PORTAL LINK FT
You can access the FollowMyHealth Patient Portal offered by [facility] by registering at the following website: [email or web address]. By joining [facility]’s FollowMyHealth portal, you will also be able to view your health information using other applications (apps) compatible with our system.

## 2024-08-30 LAB — ALDOST SERPL-MCNC: 3.6 NG/DL — SIGNIFICANT CHANGE UP

## 2024-09-01 DIAGNOSIS — Z87.440 PERSONAL HISTORY OF URINARY (TRACT) INFECTIONS: ICD-10-CM

## 2024-09-01 DIAGNOSIS — E78.5 HYPERLIPIDEMIA, UNSPECIFIED: ICD-10-CM

## 2024-09-01 DIAGNOSIS — I49.9 CARDIAC ARRHYTHMIA, UNSPECIFIED: ICD-10-CM

## 2024-09-01 DIAGNOSIS — I10 ESSENTIAL (PRIMARY) HYPERTENSION: ICD-10-CM

## 2024-09-01 DIAGNOSIS — N40.0 BENIGN PROSTATIC HYPERPLASIA WITHOUT LOWER URINARY TRACT SYMPTOMS: ICD-10-CM

## 2024-09-01 DIAGNOSIS — R00.1 BRADYCARDIA, UNSPECIFIED: ICD-10-CM

## 2024-09-01 DIAGNOSIS — E11.9 TYPE 2 DIABETES MELLITUS WITHOUT COMPLICATIONS: ICD-10-CM

## 2024-09-05 RX ORDER — GLIPIZIDE 10 MG
1 TABLET ORAL
Refills: 0 | DISCHARGE

## 2024-09-05 RX ORDER — NIFEDIPINE 60 MG/1
1 TABLET, FILM COATED, EXTENDED RELEASE ORAL
Refills: 0 | DISCHARGE

## 2024-09-05 NOTE — CHART NOTE - NSCHARTNOTEFT_GEN_A_CORE
Groin suture successfully removed from R groin. No bleeding or hematoma noted. Patient educated on groin precautions.
PACU ANESTHESIA ADMISSION NOTE      Procedure: Pacemaker    __x__  Patent Airway    __x__  Full return of protective reflexes    __x__  Full recovery from anesthesia / back to baseline      Mental Status:  __x__ Awake   _____ Alert   _____ Drowsy   _____ Sedated    Nausea/Vomiting:  __x__ NO  ______Yes,   See Post - Op Orders          Pain Scale (0-10):  ___0__    Treatment: ____ None    ___x_ See Post - Op/PCA Orders    Post - Operative Fluids:   ____ Oral   __x__ See Post - Op Orders    Plan: Discharge:   ____Home       __x___Floor     _____Critical Care    _____  Other:_________________    Comments:
Patient seen and evaluated by PT. Recommended STR. Patient and family refused STR and home services.  A rolling walker is necessary for discharge. The pt's home provides adequate access between rooms for the use of the rolling walker. The rolling walker will significantly improve the pt's ability to participate in MRADL's and will be used on a regular basis in the home.
Post-Discharge Medication Review: Completed	  	  Patient's preferred pharmacy was updated in OMR: [facility] [place], [place] ([address] 	  	  Caregiver (Lindsay, Daughter) contacted to offer medication counseling post-discharge. Medication reconciliation completed. Per Caregiver, medications include:	  	   	  1.	finasteride 5 mg oral tablet 1 tab(s) orally once a day  2.	hydroCHLOROthiazide 25 mg oral tablet 1 tab(s) orally once a day  3.	Januvia 100 mg oral tablet 1 tab(s) orally once a day  4.	Lantus 100 units/mL subcutaneous solution 10 unit(s) subcutaneous once a day (at bedtime)  5.	metFORMIN 1000 mg oral tablet 1 tab(s) orally 2 times a day  6.	NIFEdipine (Eqv-Adalat CC) 60 mg oral tablet, extended release 1 tab(s) orally 2 times a day  7.	Percocet 7.5 mg-325 mg oral tablet 1 tab(s) orally once a day as needed for moderate pain  8.	spironolactone 25 mg oral tablet 1 tab(s) orally once a day  9.	tamsulosin 0.4 mg oral capsule 1 cap(s) orally once a day  10.	valsartan 160 mg oral tablet 1 tab(s) orally once a day  11.	valsartan 80 mg oral tablet 1 tab(s) orally once a day   	  Medications added to OMR (updated per discussion with Caregiver):	  12.	glipiZIDE 10 mg oral tablet 1 tab(s) orally twice a day   	   	  Medications removed from OMR (updated per discussion with Caregiver):	  1.	atorvastatin 40 mg oral tablet 1 tab(s) orally once a day (at bedtime)  2.	glipiZIDE 10 mg oral tablet 1 tab(s) orally once a day   	   	  Medication name, indication, administration, side effects, and monitoring reviewed for new medications during post discharge counseling visit with Caregiver. Caregiver demonstrated understanding. Counseling offered for all medications.	  	  Per discharge document, patient instructed to stop taking amlodipine (switched to Nifedipine). However, caregiver states patient has been taking amlodipine since discharge. Advised caregiver to stop amlodipine per discharge instructions. Per discharge document, patient instructed to take Nifedipine twice a day. However, prescription states to take Nifedipine once a day. Confirmed with inpatient team, patient is to take Nifedipine once a day.    Caregiver states patient is no longer taking atorvastatin 40 mg per outpatient provider instructions. Per caregiver, patient is taking glipizide 10 mg twice a day (not once a day). Glipizide frequency has been updated in OMR. Patient has a follow up appointment with primary care provider 9/9/24 .	  	  	  Vicki Daly, PharmD	  Clinical Pharmacy Specialist, Pharmacy Telehealth Team	  Can be reached via MS Teams or [phone]

## 2024-09-09 LAB — RENIN PLAS-CCNC: 2.38 NG/ML/HR — SIGNIFICANT CHANGE UP (ref 0.17–5.38)

## 2024-09-16 ENCOUNTER — APPOINTMENT (OUTPATIENT)
Dept: ELECTROPHYSIOLOGY | Facility: CLINIC | Age: 84
End: 2024-09-16

## 2024-09-16 VITALS
WEIGHT: 160 LBS | SYSTOLIC BLOOD PRESSURE: 144 MMHG | HEIGHT: 66 IN | DIASTOLIC BLOOD PRESSURE: 84 MMHG | TEMPERATURE: 98 F | HEART RATE: 84 BPM | BODY MASS INDEX: 25.71 KG/M2

## 2024-09-16 DIAGNOSIS — I10 ESSENTIAL (PRIMARY) HYPERTENSION: ICD-10-CM

## 2024-09-16 DIAGNOSIS — R60.0 LOCALIZED EDEMA: ICD-10-CM

## 2024-09-16 DIAGNOSIS — Z51.89 ENCOUNTER FOR OTHER SPECIFIED AFTERCARE: ICD-10-CM

## 2024-09-16 DIAGNOSIS — Z95.0 PRESENCE OF CARDIAC PACEMAKER: ICD-10-CM

## 2024-09-16 PROCEDURE — 93280 PM DEVICE PROGR EVAL DUAL: CPT

## 2024-09-16 PROCEDURE — 99214 OFFICE O/P EST MOD 30 MIN: CPT

## 2024-09-16 NOTE — PROCEDURE
[No] : not [See Device Printout] : See device printout [Pacemaker] : pacemaker [DDD] : DDD [Normal] : The battery status is normal. [Lead Imp:  ___ohms] : lead impedance was [unfilled] ohms [Sensing Amplitude ___mv] : sensing amplitude was [unfilled] mv [___V @] : [unfilled] V [___ ms] : [unfilled] ms [Programmed for Longevity] : output reprogrammed for improved battery longevity

## 2024-09-16 NOTE — PHYSICAL EXAM
[Well Developed] : well developed [Well Nourished] : well nourished [No Acute Distress] : no acute distress [Normal Conjunctiva] : normal conjunctiva [Normal S1, S2] : normal S1, S2 [No Murmur] : no murmur [Clear Lung Fields] : clear lung fields [Good Air Entry] : good air entry [No Respiratory Distress] : no respiratory distress  [Soft] : abdomen soft [Normal Gait] : normal gait [Moves all extremities] : moves all extremities [Normal Speech] : normal speech [Alert and Oriented] : alert and oriented [General Appearance - Well Developed] : well developed [Normal Appearance] : normal appearance [Well Groomed] : well groomed [General Appearance - Well Nourished] : well nourished [No Deformities] : no deformities [General Appearance - In No Acute Distress] : no acute distress [Heart Rate And Rhythm] : heart rate and rhythm were normal [] : no respiratory distress [Right Infraclavicular] : right infraclavicular area [Clean] : clean [Dry] : dry [Well-Healed] : well-healed

## 2024-09-17 ENCOUNTER — APPOINTMENT (OUTPATIENT)
Dept: UROLOGY | Facility: CLINIC | Age: 84
End: 2024-09-17

## 2024-09-18 RX ORDER — INSULIN GLARGINE 100 [IU]/ML
100 INJECTION, SOLUTION SUBCUTANEOUS
Qty: 6 | Refills: 0 | Status: ACTIVE | COMMUNITY
Start: 2024-05-11

## 2024-09-18 RX ORDER — DICLOFENAC SODIUM 75 MG/1
75 TABLET, DELAYED RELEASE ORAL
Qty: 60 | Refills: 0 | Status: ACTIVE | COMMUNITY
Start: 2024-03-29

## 2024-09-18 RX ORDER — PREGABALIN 150 MG/1
150 CAPSULE ORAL TWICE DAILY
Refills: 0 | Status: ACTIVE | COMMUNITY

## 2024-09-18 RX ORDER — SPIRONOLACTONE 25 MG/1
25 TABLET ORAL DAILY
Qty: 30 | Refills: 2 | Status: ACTIVE | COMMUNITY

## 2024-09-18 RX ORDER — VALSARTAN 80 MG/1
80 TABLET, COATED ORAL DAILY
Refills: 0 | Status: ACTIVE | COMMUNITY

## 2024-09-18 RX ORDER — PEN NEEDLE, DIABETIC 31 G X1/4"
31G X 6 MM NEEDLE, DISPOSABLE MISCELLANEOUS
Qty: 100 | Refills: 0 | Status: ACTIVE | COMMUNITY
Start: 2024-07-09

## 2024-09-18 RX ORDER — HYDROCHLOROTHIAZIDE 25 MG/1
25 TABLET ORAL DAILY
Qty: 90 | Refills: 3 | Status: ACTIVE | COMMUNITY

## 2024-09-18 RX ORDER — PIOGLITAZONE HYDROCHLORIDE 45 MG/1
45 TABLET ORAL
Qty: 90 | Refills: 0 | Status: ACTIVE | COMMUNITY
Start: 2024-07-23

## 2024-09-18 RX ORDER — CEFPODOXIME PROXETIL 200 MG/1
200 TABLET, FILM COATED ORAL
Qty: 20 | Refills: 0 | Status: ACTIVE | COMMUNITY
Start: 2024-08-03

## 2024-09-18 RX ORDER — NIFEDIPINE 60 MG/1
60 TABLET, FILM COATED, EXTENDED RELEASE ORAL DAILY
Refills: 0 | Status: ACTIVE | COMMUNITY

## 2024-09-18 RX ORDER — VALSARTAN 160 MG/1
160 TABLET ORAL DAILY
Refills: 0 | Status: ACTIVE | COMMUNITY

## 2024-10-02 ENCOUNTER — EMERGENCY (EMERGENCY)
Facility: HOSPITAL | Age: 84
LOS: 0 days | Discharge: HOME | End: 2024-10-02
Attending: EMERGENCY MEDICINE

## 2024-10-02 VITALS
SYSTOLIC BLOOD PRESSURE: 125 MMHG | OXYGEN SATURATION: 97 % | RESPIRATION RATE: 18 BRPM | HEART RATE: 85 BPM | WEIGHT: 160.06 LBS | TEMPERATURE: 98 F | HEIGHT: 66 IN | DIASTOLIC BLOOD PRESSURE: 84 MMHG

## 2024-10-02 DIAGNOSIS — Z98.49 CATARACT EXTRACTION STATUS, UNSPECIFIED EYE: Chronic | ICD-10-CM

## 2024-10-02 DIAGNOSIS — Z96.652 PRESENCE OF LEFT ARTIFICIAL KNEE JOINT: Chronic | ICD-10-CM

## 2024-10-02 PROCEDURE — 99284 EMERGENCY DEPT VISIT MOD MDM: CPT

## 2024-10-02 RX ORDER — SIMVASTATIN 10 MG
1 TABLET ORAL
Qty: 30 | Refills: 0
Start: 2024-10-02 | End: 2024-10-31

## 2024-10-02 RX ORDER — NIFEDIPINE 60 MG/1
1 TABLET, FILM COATED, EXTENDED RELEASE ORAL
Qty: 60 | Refills: 0
Start: 2024-10-02 | End: 2024-10-31

## 2024-10-02 RX ORDER — HYDROCHLOROTHIAZIDE 12.5 MG/1
1 CAPSULE ORAL
Qty: 30 | Refills: 0
Start: 2024-10-02 | End: 2024-10-31

## 2024-10-02 NOTE — ED PROVIDER NOTE - PATIENT PORTAL LINK FT
You can access the FollowMyHealth Patient Portal offered by [facility] by registering at the following website: [email or web address]. By joining [employer]’s FollowMyHealth portal, you will also be able to view your health information using other applications (apps) compatible with our system.

## 2024-10-02 NOTE — ED PROVIDER NOTE - CLINICAL SUMMARY MEDICAL DECISION MAKING FREE TEXT BOX
84-year-old male, past medical history of hypertension, hyperlipidemia, chronic pain where he is followed by pain management doctor, was seen today by his PM&R, had BP checked at that time and it was 180 systolic.  Patient has no complaints.  Patient was sent to ED for BP management.  BP in the ED is 125/84.  Patient continues to have no complaints.  On exam, pt in NAD, AAOx3, head NC/AT, CN II-XII intact, lungs CTA B/L, CV S1S2 regular, abdomen soft/NT/ND/(+)BS, ext (-) edema, motor 5/5x4, sensation intact.  Will DC.  Patient asked for refill of his BP meds–meds sent to the pharmacy.

## 2024-10-02 NOTE — ED PROVIDER NOTE - INTERNATIONAL TRAVEL
Called pt. to remind them of appointment on 08/27/2024 and had to leave a detailed voicemail with appointment date and time. Reminded patient to just come at appointment time, and to not come at the lab appointment time. Reminded patient that we will not check them in any more than 30 minutes before appointment time. We have now moved to the [facility] [place] that is located between our old office and the ER at the [facility]  
No

## 2024-10-02 NOTE — ED ADULT NURSE NOTE - NS ED PATIENT SAFETY CONCERN
Iv discontinued. Scripts for pantoprazole and sumatriptan sent to patient's [address] per AVS. Discharge instructions given to patient at bedside, no complaints of pain. Patients states still feeling drowsy from taking xanax at home, but stated she is going to stay with daughter at her house. And daughter to  patient from Main entrance. Patient escorted to main entrance with staff. No

## 2024-10-02 NOTE — ED PROVIDER NOTE - OBJECTIVE STATEMENT
85 yo M with HTN, HLD, chronic back pain sent by pain management doctor after being found to have an elevated blood pressure in his office during a follow up visit. Patient has no complaints. No cp, sob, fever, chills, abdominal pain, nausea, vomiting, diarrhea, back pain, urinary symptoms, headache, dizziness, paresthesias, or weakness.

## 2024-10-31 ENCOUNTER — APPOINTMENT (OUTPATIENT)
Dept: UROLOGY | Facility: CLINIC | Age: 84
End: 2024-10-31
Payer: MEDICARE

## 2024-10-31 DIAGNOSIS — N40.1 BENIGN PROSTATIC HYPERPLASIA WITH LOWER URINARY TRACT SYMPMS: ICD-10-CM

## 2024-10-31 DIAGNOSIS — N20.0 CALCULUS OF KIDNEY: ICD-10-CM

## 2024-10-31 DIAGNOSIS — N13.8 BENIGN PROSTATIC HYPERPLASIA WITH LOWER URINARY TRACT SYMPMS: ICD-10-CM

## 2024-10-31 DIAGNOSIS — C67.9 MALIGNANT NEOPLASM OF BLADDER, UNSPECIFIED: ICD-10-CM

## 2024-10-31 PROCEDURE — 81003 URINALYSIS AUTO W/O SCOPE: CPT | Mod: QW

## 2024-10-31 PROCEDURE — 99214 OFFICE O/P EST MOD 30 MIN: CPT

## 2024-10-31 PROCEDURE — G2211 COMPLEX E/M VISIT ADD ON: CPT

## 2024-10-31 RX ORDER — TAMSULOSIN HYDROCHLORIDE 0.4 MG/1
0.4 CAPSULE ORAL
Qty: 90 | Refills: 3 | Status: ACTIVE | COMMUNITY
Start: 2024-10-31 | End: 1900-01-01

## 2024-11-01 ENCOUNTER — NON-APPOINTMENT (OUTPATIENT)
Age: 84
End: 2024-11-01

## 2024-11-01 LAB
BILIRUB UR QL STRIP: NORMAL
CLARITY UR: CLEAR
COLLECTION METHOD: NORMAL
GLUCOSE UR-MCNC: NORMAL
HCG UR QL: 0.2 EU/DL
HGB UR QL STRIP.AUTO: NORMAL
KETONES UR-MCNC: NORMAL
LEUKOCYTE ESTERASE UR QL STRIP: NORMAL
NITRITE UR QL STRIP: NORMAL
PH UR STRIP: 5.5
PROT UR STRIP-MCNC: NORMAL
SP GR UR STRIP: 1.03

## 2024-12-02 ENCOUNTER — APPOINTMENT (OUTPATIENT)
Dept: UROLOGY | Facility: CLINIC | Age: 84
End: 2024-12-02

## 2024-12-20 ENCOUNTER — APPOINTMENT (OUTPATIENT)
Dept: ELECTROPHYSIOLOGY | Facility: CLINIC | Age: 84
End: 2024-12-20
Payer: MEDICARE

## 2024-12-20 VITALS
BODY MASS INDEX: 25.71 KG/M2 | WEIGHT: 160 LBS | SYSTOLIC BLOOD PRESSURE: 202 MMHG | HEART RATE: 60 BPM | HEIGHT: 66 IN | DIASTOLIC BLOOD PRESSURE: 70 MMHG

## 2024-12-20 VITALS — DIASTOLIC BLOOD PRESSURE: 80 MMHG | SYSTOLIC BLOOD PRESSURE: 200 MMHG

## 2024-12-20 DIAGNOSIS — Z95.0 PRESENCE OF CARDIAC PACEMAKER: ICD-10-CM

## 2024-12-20 DIAGNOSIS — I10 ESSENTIAL (PRIMARY) HYPERTENSION: ICD-10-CM

## 2024-12-20 DIAGNOSIS — R00.1 BRADYCARDIA, UNSPECIFIED: ICD-10-CM

## 2024-12-20 PROCEDURE — 93000 ELECTROCARDIOGRAM COMPLETE: CPT | Mod: 59

## 2024-12-20 PROCEDURE — 93280 PM DEVICE PROGR EVAL DUAL: CPT

## 2024-12-20 PROCEDURE — 99214 OFFICE O/P EST MOD 30 MIN: CPT

## 2024-12-20 RX ORDER — SPIRONOLACTONE 25 MG/1
25 TABLET ORAL DAILY
Refills: 0 | Status: ACTIVE | COMMUNITY
Start: 2024-12-20

## 2024-12-20 RX ORDER — ATORVASTATIN CALCIUM 40 MG/1
40 TABLET, FILM COATED ORAL
Qty: 30 | Refills: 0 | Status: ACTIVE | COMMUNITY
Start: 2024-12-20

## 2024-12-20 RX ORDER — AMLODIPINE BESYLATE 10 MG/1
10 TABLET ORAL DAILY
Qty: 30 | Refills: 1 | Status: ACTIVE | COMMUNITY
Start: 2024-12-20

## 2024-12-20 RX ORDER — FINASTERIDE 5 MG/1
5 TABLET, FILM COATED ORAL DAILY
Refills: 0 | Status: ACTIVE | COMMUNITY
Start: 2024-12-20

## 2024-12-20 RX ORDER — ENALAPRIL MALEATE 20 MG/1
20 TABLET ORAL DAILY
Refills: 0 | Status: ACTIVE | COMMUNITY
Start: 2024-12-20

## 2025-01-07 ENCOUNTER — EMERGENCY (EMERGENCY)
Facility: HOSPITAL | Age: 85
LOS: 0 days | Discharge: HOME | End: 2025-01-07
Attending: STUDENT IN AN ORGANIZED HEALTH CARE EDUCATION/TRAINING PROGRAM
Payer: MEDICARE

## 2025-01-07 VITALS
WEIGHT: 160.06 LBS | HEART RATE: 73 BPM | DIASTOLIC BLOOD PRESSURE: 80 MMHG | HEIGHT: 66 IN | SYSTOLIC BLOOD PRESSURE: 159 MMHG | TEMPERATURE: 98 F | OXYGEN SATURATION: 96 % | RESPIRATION RATE: 18 BRPM

## 2025-01-07 DIAGNOSIS — M16.11 UNILATERAL PRIMARY OSTEOARTHRITIS, RIGHT HIP: ICD-10-CM

## 2025-01-07 DIAGNOSIS — Z98.49 CATARACT EXTRACTION STATUS, UNSPECIFIED EYE: Chronic | ICD-10-CM

## 2025-01-07 DIAGNOSIS — Z96.652 PRESENCE OF LEFT ARTIFICIAL KNEE JOINT: Chronic | ICD-10-CM

## 2025-01-07 DIAGNOSIS — I10 ESSENTIAL (PRIMARY) HYPERTENSION: ICD-10-CM

## 2025-01-07 DIAGNOSIS — E11.9 TYPE 2 DIABETES MELLITUS WITHOUT COMPLICATIONS: ICD-10-CM

## 2025-01-07 PROCEDURE — 99284 EMERGENCY DEPT VISIT MOD MDM: CPT | Mod: 25

## 2025-01-07 PROCEDURE — 73502 X-RAY EXAM HIP UNI 2-3 VIEWS: CPT | Mod: 26,RT

## 2025-01-07 PROCEDURE — 73502 X-RAY EXAM HIP UNI 2-3 VIEWS: CPT | Mod: RT

## 2025-01-07 PROCEDURE — 99284 EMERGENCY DEPT VISIT MOD MDM: CPT

## 2025-01-07 PROCEDURE — 96374 THER/PROPH/DIAG INJ IV PUSH: CPT

## 2025-01-07 RX ORDER — LIDOCAINE 50 MG/G
1 OINTMENT TOPICAL ONCE
Refills: 0 | Status: COMPLETED | OUTPATIENT
Start: 2025-01-07 | End: 2025-01-07

## 2025-01-07 RX ORDER — ACETAMINOPHEN 80 MG/.8ML
650 SOLUTION/ DROPS ORAL ONCE
Refills: 0 | Status: COMPLETED | OUTPATIENT
Start: 2025-01-07 | End: 2025-01-07

## 2025-01-07 RX ORDER — KETOROLAC TROMETHAMINE 30 MG/ML
30 INJECTION INTRAMUSCULAR; INTRAVENOUS ONCE
Refills: 0 | Status: DISCONTINUED | OUTPATIENT
Start: 2025-01-07 | End: 2025-01-07

## 2025-01-07 RX ADMIN — KETOROLAC TROMETHAMINE 30 MILLIGRAM(S): 30 INJECTION INTRAMUSCULAR; INTRAVENOUS at 15:18

## 2025-01-07 RX ADMIN — ACETAMINOPHEN 650 MILLIGRAM(S): 80 SOLUTION/ DROPS ORAL at 15:17

## 2025-01-07 NOTE — ED PROVIDER NOTE - PROGRESS NOTE DETAILS
ERNESTINE MAYER:  Patient evaluated as per HPI and PE  Patient w/ R. Hip pain x 1 week. PE unremarkable. Denies falls and trauma to the area.   DDx includes but is not limited to: Fracture, Osteoarthritis, MSK Strain.  Plan: Analgesia, Xray R. Hip ERNESTINE MAYER:  Xray shows bone-on-bone contact of head of femur with acetabulum concerning for Osteoarthritis.   Will d/c patient with Ortho follow-up, discharge instructions, and return precautions.

## 2025-01-07 NOTE — ED PROVIDER NOTE - CLINICAL SUMMARY MEDICAL DECISION MAKING FREE TEXT BOX
84-year-old presented today for evaluation of hip pain.  Patient's x-ray was indicative of no acute fracture or dislocation.  Patient has degenerative changes of the hips and spine.  Given patient having pain without any traumatic injury,  will discharge at this time to close follow-up outpatient with orthopedics.  Return precautions explained to patient.

## 2025-01-07 NOTE — ED PROVIDER NOTE - PHYSICAL EXAMINATION
CONSTITUTIONAL: well-appearing, in NAD  SKIN: Warm dry, normal skin turgor  HEAD: NCAT  EYES: EOMI, PERRLA, no scleral icterus, conjunctiva pink  ENT: normal pharynx with no erythema or exudates  NECK: Supple; non tender. Full ROM.  CARD: RRR, no murmurs.  RESP: clear to ausculation b/l. No crackles or wheezing.  ABD: soft, non-tender, non-distended, no rebound or guarding.  EXT: Full ROM, no bony tenderness, no pedal edema, no calf tenderness  NEURO: normal motor.  CN II-XII intact. patient ambulatory with a walker. limping gait.   PSYCH: Cooperative, appropriate.

## 2025-01-07 NOTE — ED ADULT TRIAGE NOTE - CHIEF COMPLAINT QUOTE
Patient ambulatory into triage complaining of right hip pain radiating down thigh- denies any trauma or recent falls

## 2025-01-07 NOTE — ED PROVIDER NOTE - PATIENT PORTAL LINK FT
You can access the FollowMyHealth Patient Portal offered by [facility] by registering at the following website: [email or web address]. By joining [facility]’s FollowMyHealth portal, you will also be able to view your health information using other applications (apps) compatible with our system.

## 2025-01-07 NOTE — ED PROVIDER NOTE - NSFOLLOWUPINSTRUCTIONS_ED_ALL_ED_FT
Our Emergency Department Referral Coordinators will be reaching out to you in the next 24-48 hours from 9:00am to 5:00pm (Monday to Friday) with a follow up appointment. Please expect a phone call from the hospital in that time frame. If you do not receive a call or if you have any questions or concerns, you can reach them at [phone]    Osteoarthritis  A knee joint, showing the effects of osteoarthritis on the cartilage.   Osteoarthritis is a type of arthritis. It refers to joint pain or joint disease. Osteoarthritis affects tissue that covers the ends of bones in joints (cartilage). Cartilage acts as a cushion between the bones and helps them move smoothly. Osteoarthritis occurs when cartilage in the joints gets worn down. Osteoarthritis is sometimes called "wear and tear" arthritis.    Osteoarthritis is the most common form of arthritis. It often occurs in older people. It is a condition that gets worse over time. The joints most often affected by this condition are in the fingers, toes, hips, knees, and spine, including the neck and lower back.    What are the causes?  This condition is caused by the wearing down of cartilage that covers the ends of bones.    What increases the risk?  The following factors may make you more likely to develop this condition:  Being age 50 or older.  Obesity.  Overuse of joints.  Past injury of a joint.  Past surgery on a joint.  Family history of osteoarthritis.  What are the signs or symptoms?  The main symptoms of this condition are pain, swelling, and stiffness in the joint. Other symptoms may include:  An enlarged joint.  More pain and further damage caused by small pieces of bone or cartilage that break off and float inside of the joint.  Small deposits of bone (osteophytes) that grow on the edges of the joint.  A grating or scraping feeling inside the joint when you move it.  Popping or creaking sounds when you move.  Difficulty walking or exercising.  An inability to  items, twist your hand, or control the movements of your hands and fingers.  How is this diagnosed?  This condition may be diagnosed based on:  Your medical history.  A physical exam.  Your symptoms.  X-rays of the affected joints.  Blood tests to rule out other types of arthritis.  How is this treated?  There is no cure for this condition, but treatment can help control pain and improve joint function. Treatment may include a combination of therapies, such as:  Pain relief techniques, such as:  Applying heat and cold to the joint.  Massage.  A form of talk therapy called cognitive behavioral therapy (CBT). This therapy helps you set goals and follow up on the changes that you make.  Medicines for pain and inflammation. The medicines can be taken by mouth or applied to the skin. They include:  NSAIDs, such as ibuprofen.  Prescription medicines.  Strong anti-inflammatory medicines (corticosteroids).  Certain nutritional supplements.  A prescribed exercise program. You may work with a physical therapist.  Assistive devices, such as a brace, wrap, splint, specialized glove, or cane.  A weight control plan.  Surgery, such as:  An osteotomy. This is done to reposition the bones and relieve pain or to remove loose pieces of bone and cartilage.  Joint replacement surgery. You may need this surgery if you have advanced osteoarthritis.  Follow these instructions at home:  Activity    Rest your affected joints as told by your health care provider.  Exercise as told by your provider. The provider may recommend specific types of exercise, such as:  Strengthening exercises. These are done to strengthen the muscles that support joints affected by arthritis.  Aerobic activities. These are exercises, such as brisk walking or water aerobics, that increase your heart rate.  Range-of-motion activities. These help your joints move more easily.  Balance and agility exercises.  Managing pain, stiffness, and swelling    A heating pad being used on the affected joint.  Bag of ice on a towel on the skin.  If told, apply heat to the affected area as often as told by your provider. Use the heat source that your provider recommends, such as a moist heat pack or a heating pad.  If you have a removable assistive device, remove it as told by your provider.  Place a towel between your skin and the heat source. If your provider tells you to keep the assistive device on while you apply heat, place a towel between the assistive device and the heat source.  Leave the heat on for 20–30 minutes.  If told, put ice on the affected area.  If you have a removable assistive device, remove it as told by your provider.  Put ice in a plastic bag.  Place a towel between your skin and the bag. If your provider tells you to keep the assistive device on during icing, place a towel between the assistive device and the bag.  Leave the ice on for 20 minutes, 2–3 times a day.  If your skin turns bright red, remove the ice or heat right away to prevent skin damage. The risk of damage is higher if you cannot feel pain, heat, or cold.  Move your fingers or toes often to reduce stiffness and swelling.  Raise (elevate) the affected area above the level of your heart while you are sitting or lying down.  General instructions    Take over-the-counter and prescription medicines only as told by your provider.  Maintain a healthy weight. Follow instructions from your provider for weight control.  Do not use any products that contain nicotine or tobacco. These products include cigarettes, chewing tobacco, and vaping devices, such as e-cigarettes. If you need help quitting, ask your provider.  Use assistive devices as told by your provider.  Where to find more information  National [place] of Arthritis and Musculoskeletal and Skin Diseases: niams.nih.gov  National [place] on Aging: marely.nih.gov  American College of Rheumatology: rheumatology.org  Contact a health care provider if:  You have redness, swelling, or a feeling of warmth in a joint that gets worse.  You have a fever along with joint or muscle aches.  You develop a rash.  You have trouble doing your normal activities.  You have pain that gets worse and is not relieved by pain medicine.  This information is not intended to replace advice given to you by your health care provider. Make sure you discuss any questions you have with your health care provider.

## 2025-01-07 NOTE — ED PROVIDER NOTE - NSPTACCESSSVCSAPPTDETAILS_ED_ALL_ED_FT
Patient with XRay R. Hip concerning for Osteoarthritis of R. Hip. Please accommodate with appointment.

## 2025-01-07 NOTE — ED PROVIDER NOTE - OBJECTIVE STATEMENT
84 year-old, male patient with PMHx HTN, DM who comes to ED for R. Hip pain. Patient refers that 1 week ago he developed pain in his right hip that he first noticed while getting out of bed. The pain is worse with movement, ambulation, bending down, and loading the joint. Denies any falls or trauma to the area. Otherwise denies fevers, headache, chills, cp, sob, n/v/d, abd pain, back pain, changes in urinary/stool habitus, calf tenderness or swelling, recent travel, and prolonged immobilization.

## 2025-01-15 ENCOUNTER — APPOINTMENT (OUTPATIENT)
Dept: ORTHOPEDIC SURGERY | Facility: CLINIC | Age: 85
End: 2025-01-15

## 2025-01-27 ENCOUNTER — APPOINTMENT (OUTPATIENT)
Dept: ORTHOPEDIC SURGERY | Facility: CLINIC | Age: 85
End: 2025-01-27
Payer: MEDICARE

## 2025-01-27 VITALS — HEIGHT: 66 IN | WEIGHT: 160 LBS | BODY MASS INDEX: 25.71 KG/M2

## 2025-01-27 DIAGNOSIS — M54.31 SCIATICA, RIGHT SIDE: ICD-10-CM

## 2025-01-27 DIAGNOSIS — M54.41 LUMBAGO WITH SCIATICA, RIGHT SIDE: ICD-10-CM

## 2025-01-27 PROCEDURE — 72100 X-RAY EXAM L-S SPINE 2/3 VWS: CPT

## 2025-01-27 PROCEDURE — 73501 X-RAY EXAM HIP UNI 1 VIEW: CPT

## 2025-01-27 PROCEDURE — 99203 OFFICE O/P NEW LOW 30 MIN: CPT

## 2025-02-19 ENCOUNTER — EMERGENCY (EMERGENCY)
Facility: HOSPITAL | Age: 85
LOS: 0 days | Discharge: HOME | End: 2025-02-20
Attending: STUDENT IN AN ORGANIZED HEALTH CARE EDUCATION/TRAINING PROGRAM
Payer: MEDICARE

## 2025-02-19 VITALS
HEIGHT: 66 IN | TEMPERATURE: 98 F | WEIGHT: 160.06 LBS | SYSTOLIC BLOOD PRESSURE: 139 MMHG | RESPIRATION RATE: 17 BRPM | DIASTOLIC BLOOD PRESSURE: 75 MMHG | OXYGEN SATURATION: 97 % | HEART RATE: 60 BPM

## 2025-02-19 DIAGNOSIS — E11.9 TYPE 2 DIABETES MELLITUS WITHOUT COMPLICATIONS: ICD-10-CM

## 2025-02-19 DIAGNOSIS — M79.601 PAIN IN RIGHT ARM: ICD-10-CM

## 2025-02-19 DIAGNOSIS — G89.29 OTHER CHRONIC PAIN: ICD-10-CM

## 2025-02-19 DIAGNOSIS — M19.90 UNSPECIFIED OSTEOARTHRITIS, UNSPECIFIED SITE: ICD-10-CM

## 2025-02-19 DIAGNOSIS — Z98.49 CATARACT EXTRACTION STATUS, UNSPECIFIED EYE: Chronic | ICD-10-CM

## 2025-02-19 DIAGNOSIS — M25.521 PAIN IN RIGHT ELBOW: ICD-10-CM

## 2025-02-19 DIAGNOSIS — Z96.652 PRESENCE OF LEFT ARTIFICIAL KNEE JOINT: Chronic | ICD-10-CM

## 2025-02-19 DIAGNOSIS — I10 ESSENTIAL (PRIMARY) HYPERTENSION: ICD-10-CM

## 2025-02-19 DIAGNOSIS — M79.621 PAIN IN RIGHT UPPER ARM: ICD-10-CM

## 2025-02-19 PROCEDURE — 96372 THER/PROPH/DIAG INJ SC/IM: CPT

## 2025-02-19 PROCEDURE — 99283 EMERGENCY DEPT VISIT LOW MDM: CPT | Mod: 25

## 2025-02-19 PROCEDURE — 99284 EMERGENCY DEPT VISIT MOD MDM: CPT

## 2025-02-19 PROCEDURE — 73070 X-RAY EXAM OF ELBOW: CPT | Mod: 26,RT

## 2025-02-19 PROCEDURE — 73030 X-RAY EXAM OF SHOULDER: CPT | Mod: 26,RT

## 2025-02-19 PROCEDURE — 73030 X-RAY EXAM OF SHOULDER: CPT | Mod: RT

## 2025-02-19 PROCEDURE — 73070 X-RAY EXAM OF ELBOW: CPT | Mod: RT

## 2025-02-19 RX ORDER — METHOCARBAMOL 500 MG
1 TABLET ORAL
Qty: 10 | Refills: 0
Start: 2025-02-19 | End: 2025-02-21

## 2025-02-19 RX ORDER — LIDOCAINE HYDROCHLORIDE 30 MG/G
1 CREAM TOPICAL ONCE
Refills: 0 | Status: COMPLETED | OUTPATIENT
Start: 2025-02-19 | End: 2025-02-19

## 2025-02-19 RX ORDER — FAMOTIDINE 10 MG/ML
20 INJECTION INTRAVENOUS ONCE
Refills: 0 | Status: DISCONTINUED | OUTPATIENT
Start: 2025-02-19 | End: 2025-02-19

## 2025-02-19 RX ORDER — FAMOTIDINE 10 MG/ML
20 INJECTION INTRAVENOUS DAILY
Refills: 0 | Status: DISCONTINUED | OUTPATIENT
Start: 2025-02-19 | End: 2025-02-20

## 2025-02-19 RX ORDER — METHOCARBAMOL 500 MG
1000 TABLET ORAL ONCE
Refills: 0 | Status: COMPLETED | OUTPATIENT
Start: 2025-02-19 | End: 2025-02-19

## 2025-02-19 RX ORDER — KETOROLAC TROMETHAMINE 10 MG
30 TABLET ORAL ONCE
Refills: 0 | Status: DISCONTINUED | OUTPATIENT
Start: 2025-02-19 | End: 2025-02-19

## 2025-02-19 RX ADMIN — Medication 1000 MILLIGRAM(S): at 23:17

## 2025-02-19 RX ADMIN — FAMOTIDINE 20 MILLIGRAM(S): 10 INJECTION INTRAVENOUS at 23:18

## 2025-02-19 RX ADMIN — Medication 30 MILLIGRAM(S): at 23:17

## 2025-02-19 NOTE — ED PROVIDER NOTE - PATIENT PORTAL LINK FT
You can access the FollowMyHealth Patient Portal offered by [facility] by registering at the following website: [email or web address]. By joining [employer]’s FollowMyHealth portal, you will also be able to view your health information using other applications (apps) compatible with our system.

## 2025-02-19 NOTE — ED ADULT TRIAGE NOTE - CHIEF COMPLAINT QUOTE
Presents to ED c/o R arm pain x months, worsening as of a few days ago. Pt sts he has difficulty picking things up with his R arm.

## 2025-02-19 NOTE — ED PROVIDER NOTE - NSFOLLOWUPINSTRUCTIONS_ED_ALL_ED_FT
Physical Therapy Referral  Our Emergency Department Referral Coordinators will be reaching out to you in the next 24-48 hours from 9:00am to 5:00pm with a follow up appointment. Please expect a phone call from the hospital in that time frame. If you do not receive a call or if you have any questions or concerns, you can reach them at   [phone]    DISCHARGE INSTRUCTIONS:  Medicines:  •NSAIDs: These medicines decrease swelling and pain. Take as directed. NSAIDs can cause stomach bleeding and kidney problems if not taken correctly.    •Rest: You will need to rest your foot for 1 to 2 days after your injury. This will help decrease the risk of more damage.  •Ice: Ice helps decrease swelling and pain. Ice may also help prevent tissue damage. Use an ice pack, or put crushed ice in a plastic bag. Cover it with a towel and place it on your foot for 15 to 20 minutes every hour or as directed.  •Compression: Compression (tight hold) provides support and helps decrease swelling and movement so your foot can heal. You may be told to keep your foot wrapped with a tight elastic bandage. Follow instructions about how to apply your bandage. Do not massage your foot. You could cause more damage or pain.  •Elevation: Keep your foot raised above the level of your heart while you are sitting or lying down. This will help decrease or limit swelling. Use pillows, blankets, or rolled towels to elevate your foot comfortably. Follow up with orthopedic surgery, physical therapy and pain management within 1 week.     Physical Therapy Referral  Our Emergency Department Referral Coordinators will be reaching out to you in the next 24-48 hours from 9:00am to 5:00pm with a follow up appointment. Please expect a phone call from the hospital in that time frame. If you do not receive a call or if you have any questions or concerns, you can reach them at   [phone]    DISCHARGE INSTRUCTIONS:  Medicines:  •NSAIDs: These medicines decrease swelling and pain. Take as directed. NSAIDs can cause stomach bleeding and kidney problems if not taken correctly.    •Rest: You will need to rest your foot for 1 to 2 days after your injury. This will help decrease the risk of more damage.  •Ice: Ice helps decrease swelling and pain. Ice may also help prevent tissue damage. Use an ice pack, or put crushed ice in a plastic bag. Cover it with a towel and place it on your foot for 15 to 20 minutes every hour or as directed.  •Compression: Compression (tight hold) provides support and helps decrease swelling and movement so your foot can heal. You may be told to keep your foot wrapped with a tight elastic bandage. Follow instructions about how to apply your bandage. Do not massage your foot. You could cause more damage or pain.  •Elevation: Keep your foot raised above the level of your heart while you are sitting or lying down. This will help decrease or limit swelling. Use pillows, blankets, or rolled towels to elevate your foot comfortably.

## 2025-02-19 NOTE — ED PROVIDER NOTE - CLINICAL SUMMARY MEDICAL DECISION MAKING FREE TEXT BOX
Throughout ED observation period, pt remained clinically and hemodynamically stable.  xr as read by radiology- no fx, + arthritic changes  pain improved w/ ed supportive care  pt amenable to OP management, advised PT/ortho and pain management f/u

## 2025-02-19 NOTE — ED PROVIDER NOTE - PHYSICAL EXAMINATION
Initial vital signs reviewed.  General: NAD, nontoxic appearing.  HENT: AT/NC.  Eyes: non-injected conjunctivae b/l.  Neck: supple. full aROM. no c-spine midline tenderness or stepoff.  CV: RRR, no murmurs. 2+ distal pulses x4.  Pulm: nonlabored work of breathing, CTAB.  Ext: RUE: Limited arom and prom with left shoulder flexion and abduction. Mild tenderness to palpation on anterolateral shoulder. NV intact distally. 2+ radial pulse.  Neuro: A&Ox4.  Psych: appropriate mood and affect.

## 2025-02-19 NOTE — ED PROVIDER NOTE - OBJECTIVE STATEMENT
84yoM with pmhx dm, htn, who presents for several months of right shoulder/upper arm pain. Decides to come in today given persistent symptoms. Denies any preceding trauma. States has been evaluated by ortho in the past informed possibly arthritis. Denies any neck pain, chest pain, dyspnea, lightheadedness, diaphoresis, numbness, tingling, weakness. States feels restricted lifting shoulder up.

## 2025-02-19 NOTE — ED PROVIDER NOTE - ATTENDING CONTRIBUTION TO CARE
84 year-old, male patient with PMHx HTN, DM who comes to ED for eval of R shoulder/bicep/elbow pain. pt has had this pain for a long tie and has seen pain management and ortho. pt states he had xr done a while ago and was told it was arthritis.  pt states he gets injections by pain management which help temporarily. pt is not taking anything for pain currently. no acute trauma. no swelling to shoulder/elbow.  no numbness/weakness. pain worse w/ certain movements    vss  gen- NAD, aaox3  card-rrr  lungs-ctab, no wheezing or rhonchi  neuro- full str/sensation, cn ii-xii grossly intact, normal coordination and gait, neck supple, FROM to neck   msk- RUE- no gross deformity, R shoulder limited ABduction w/ pain past ~50 degrees , mild tenderness to palpation of lateral shoulder, no skin changes or swelling, mild tenderness to distal bicep tendon, FROM to elbow - flex/ext/supination/pronation. mild elbow pain on movement, no hand/wrist pain

## 2025-02-20 NOTE — ED ADULT NURSE NOTE - HISTORY OF COVID-19 VACCINATION
No Ezekiel Braxton MD, PGY3  Workup unremarkable.  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  Spoke to on-call CT surg team who are aware of patient and will follow.  Discussed with hospitalist who accepted patient on telemetry.  Patient in agreement with plan and informed of all findings. Ezekiel Braxton MD, PGY3  Workup unremarkable.  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  Spoke to on-call CT surg team who are aware of patient.  Discussed with hospitalist who accepted patient on telemetry.  Patient in agreement with plan and informed of all findings.

## 2025-03-06 ENCOUNTER — APPOINTMENT (OUTPATIENT)
Dept: ORTHOPEDIC SURGERY | Facility: CLINIC | Age: 85
End: 2025-03-06

## 2025-05-01 NOTE — H&P ADULT - HISTORY OF PRESENT ILLNESS
Delsym as directed for cough.   Ibuprofen & Tylenol as needed.  Tea with honey or take a tsp of honey several times per day.  Mucinex as directed.  Vitamin C rich foods.  Hydration, cool mist humidifier, rest.   
79 yo male with h/o HTN, DM, DLD p/w 2 week h/o subjective fevers, sore throat, rhinorrhea sinus congestion nonproductive cough, was at [facility] 12/8 dx with pneumonia and discharged with Levaquin states that his symptoms did not improve and developed chills, came back to the ED for further evaluation. denies chest pain, shorntess of breath, nausea, vomitting, dysuria, diarrhea.

## 2025-06-04 ENCOUNTER — EMERGENCY (EMERGENCY)
Facility: HOSPITAL | Age: 85
LOS: 0 days | Discharge: HOME | End: 2025-06-04
Attending: EMERGENCY MEDICINE
Payer: MEDICARE

## 2025-06-04 VITALS
SYSTOLIC BLOOD PRESSURE: 206 MMHG | WEIGHT: 160.06 LBS | DIASTOLIC BLOOD PRESSURE: 85 MMHG | RESPIRATION RATE: 17 BRPM | OXYGEN SATURATION: 99 % | TEMPERATURE: 98 F | HEIGHT: 66 IN | HEART RATE: 60 BPM

## 2025-06-04 DIAGNOSIS — Z98.49 CATARACT EXTRACTION STATUS, UNSPECIFIED EYE: Chronic | ICD-10-CM

## 2025-06-04 DIAGNOSIS — Z96.652 PRESENCE OF LEFT ARTIFICIAL KNEE JOINT: Chronic | ICD-10-CM

## 2025-06-04 PROCEDURE — 73630 X-RAY EXAM OF FOOT: CPT | Mod: 26,LT

## 2025-06-04 PROCEDURE — 99284 EMERGENCY DEPT VISIT MOD MDM: CPT

## 2025-06-04 PROCEDURE — 73630 X-RAY EXAM OF FOOT: CPT | Mod: LT

## 2025-06-04 PROCEDURE — 99283 EMERGENCY DEPT VISIT LOW MDM: CPT | Mod: 25

## 2025-06-04 RX ORDER — OXYCODONE HYDROCHLORIDE AND ACETAMINOPHEN 10; 325 MG/1; MG/1
1 TABLET ORAL ONCE
Refills: 0 | Status: DISCONTINUED | OUTPATIENT
Start: 2025-06-04 | End: 2025-06-04

## 2025-06-04 RX ADMIN — OXYCODONE HYDROCHLORIDE AND ACETAMINOPHEN 1 TABLET(S): 10; 325 TABLET ORAL at 12:42

## 2025-06-04 NOTE — ED PROVIDER NOTE - PATIENT PORTAL LINK FT
You can access the FollowMyHealth Patient Portal offered by [facility] by registering at the following website: [email or web address]. By joining [facility]’s FollowMyHealth portal, you will also be able to view your health information using other applications (apps) compatible with our system.

## 2025-06-04 NOTE — ED ADULT TRIAGE NOTE - CHIEF COMPLAINT QUOTE
Patient presents to ED with complaints of R foot pain for 1 week. Denies any fall/known trauma to foot. Patient ambulating in triage. PMH HTN -did not take meds this morning.

## 2025-06-04 NOTE — ED ADULT NURSE NOTE - AS PAIN REST
Medically cleared for incarceration.    Please return to the emergency department if you develop new or worsening symptoms including fever, chest pain, shortness of breath, nausea and vomiting that does not resolve on its own.  
8 (severe pain)

## 2025-06-04 NOTE — ED PROVIDER NOTE - CLINICAL SUMMARY MEDICAL DECISION MAKING FREE TEXT BOX
Atraumatic left foot pain, possibly due to diabetic neuropathy.  Good cap refill and DP/PT pulses equal bilaterally.  Normal gait.  Supportive care.  DC home.

## 2025-06-04 NOTE — ED PROVIDER NOTE - OBJECTIVE STATEMENT
84-year-old male with past medical history of hypertension, hyperlipidemia, diabetes presenting with left foot pain over the past 1 week.  Denies any trauma.  No paresthesias or weakness.

## 2025-06-04 NOTE — ED PROVIDER NOTE - NSFOLLOWUPINSTRUCTIONS_ED_ALL_ED_FT
Our Emergency Department Referral Coordinators will be reaching out to you in the next 24-48 hours from 9:00am to 5:00pm to schedule a follow up appointment. Please expect a phone call from the hospital in that time frame. If you do not receive a call or if you have any questions or concerns, you can reach them at   [phone].     Foot Pain  Many things can cause foot pain. Common causes include injuries to the foot. The injuries include sprains or broken bones, or injuries that affect the nerves in the feet. Other causes of foot pain include arthritis, blisters, and bunions.    To know what causes your foot pain, your health care provider will take a detailed history of your symptoms. They will also do a physical exam as well as imaging tests, such as X-ray or MRI.    Follow these instructions at home:  Managing pain, stiffness, and swelling    Bag of ice on a towel on the skin.  If told, put ice on the painful area.  Put ice in a plastic bag.  Place a towel between your skin and the bag.  Leave the ice on for 20 minutes, 2–3 times a day.  If your skin turns bright red, remove the ice right away to prevent skin damage. The risk of damage is higher if you cannot feel pain, heat, or cold.  Activity    Do not stand or walk for long periods.  Do stretches to relieve foot pain and stiffness as told by your provider.  Do not lift anything that is heavier than 10 lb (4.5 kg), or the limit that you are told, until your provider says that it is safe. Lifting a lot of weight can put added pressure on your feet.  Return to your normal activities as told by your provider. Ask your provider what activities are safe for you.  Lifestyle    Wear comfortable, supportive shoes that fit you well. Do not wear high heels.  Keep your feet clean and dry.  General instructions    Take over-the-counter and prescription medicines only as told by your provider.  Rub your foot gently.  Pay attention to any changes in your symptoms. Let your provider know if symptoms become worse.  Keep all follow-up visits. Your provider will want to monitor your progress.  Contact a health care provider if:  Your pain does not get better after a few days of treatment at home.  Your pain gets worse.  You cannot stand on your foot.  Your foot or toes are swollen.  Your foot is numb or tingling.  Get help right away if:  Your foot or toes turn white or blue.  You have warmth and redness along your foot.  This information is not intended to replace advice given to you by your health care provider. Make sure you discuss any questions you have with your health care provider.

## 2025-06-04 NOTE — ED PROVIDER NOTE - PHYSICAL EXAMINATION
VITAL SIGNS: I have reviewed nursing notes and confirm.  CONSTITUTIONAL: Patient is in no acute distress.  SKIN: Skin exam is warm and dry, no acute rash.  HEAD: Normocephalic; atraumatic.  EXT: Normal ROM. No edema. Dorsalis pedis pulses 2+ bilaterally. +Tenderness to dorsum aspect of left foot to 1st metatarsal.   LYMPH: No acute cervical adenopathy.  NEURO: Alert, oriented. Grossly unremarkable. No focal deficits.  PSYCH: Cooperative, appropriate.

## 2025-06-04 NOTE — ED ADULT NURSE NOTE - OBJECTIVE STATEMENT
84yr old male, presenting to ED c/o foot pain. Pt c/o R foot pain x1 week. Denies n/v/d/fevers/chills, trauma/falls. Pt A&Ox4, ambulatory baseline. .

## 2025-06-06 NOTE — CHART NOTE - NSCHARTNOTEFT_GEN_A_CORE
Left v/m, CT 6/5. Patients wife would like appt. Appt scheduled 06/11/2025 01:15 PM w/ Dr. Dominguez @ [id number] (podiatry referral) CT 6/6.

## 2025-06-11 ENCOUNTER — APPOINTMENT (OUTPATIENT)
Dept: PODIATRY | Facility: CLINIC | Age: 85
End: 2025-06-11
Payer: MEDICARE

## 2025-06-11 PROCEDURE — 11721 DEBRIDE NAIL 6 OR MORE: CPT | Mod: 59

## 2025-06-11 PROCEDURE — 99203 OFFICE O/P NEW LOW 30 MIN: CPT | Mod: 25

## 2025-06-11 PROCEDURE — 11056 PARNG/CUTG B9 HYPRKR LES 2-4: CPT

## 2025-06-20 ENCOUNTER — APPOINTMENT (OUTPATIENT)
Dept: ELECTROPHYSIOLOGY | Facility: CLINIC | Age: 85
End: 2025-06-20

## 2025-06-24 NOTE — ED ADULT TRIAGE NOTE - SOURCE OF INFORMATION
This Rx needs to be sent to [facility] pharmacy. Pt using new pharmacy now. See my original message.    Patient

## 2025-07-14 ENCOUNTER — APPOINTMENT (OUTPATIENT)
Dept: PODIATRY | Facility: CLINIC | Age: 85
End: 2025-07-14

## 2025-09-07 ENCOUNTER — EMERGENCY (EMERGENCY)
Facility: HOSPITAL | Age: 85
LOS: 0 days | Discharge: HOME | End: 2025-09-07
Attending: EMERGENCY MEDICINE
Payer: MEDICARE

## 2025-09-07 VITALS
DIASTOLIC BLOOD PRESSURE: 82 MMHG | OXYGEN SATURATION: 96 % | WEIGHT: 160.06 LBS | TEMPERATURE: 98 F | SYSTOLIC BLOOD PRESSURE: 212 MMHG | HEART RATE: 64 BPM | RESPIRATION RATE: 19 BRPM

## 2025-09-07 VITALS — SYSTOLIC BLOOD PRESSURE: 207 MMHG | DIASTOLIC BLOOD PRESSURE: 86 MMHG

## 2025-09-07 DIAGNOSIS — R05.1 ACUTE COUGH: ICD-10-CM

## 2025-09-07 DIAGNOSIS — R05.9 COUGH, UNSPECIFIED: ICD-10-CM

## 2025-09-07 DIAGNOSIS — M79.604 PAIN IN RIGHT LEG: ICD-10-CM

## 2025-09-07 DIAGNOSIS — J34.89 OTHER SPECIFIED DISORDERS OF NOSE AND NASAL SINUSES: ICD-10-CM

## 2025-09-07 DIAGNOSIS — Z96.652 PRESENCE OF LEFT ARTIFICIAL KNEE JOINT: Chronic | ICD-10-CM

## 2025-09-07 DIAGNOSIS — I10 ESSENTIAL (PRIMARY) HYPERTENSION: ICD-10-CM

## 2025-09-07 DIAGNOSIS — Z98.49 CATARACT EXTRACTION STATUS, UNSPECIFIED EYE: Chronic | ICD-10-CM

## 2025-09-07 PROCEDURE — 99284 EMERGENCY DEPT VISIT MOD MDM: CPT

## 2025-09-07 PROCEDURE — 99284 EMERGENCY DEPT VISIT MOD MDM: CPT | Mod: 25

## 2025-09-07 PROCEDURE — 71046 X-RAY EXAM CHEST 2 VIEWS: CPT | Mod: 26

## 2025-09-07 PROCEDURE — 71046 X-RAY EXAM CHEST 2 VIEWS: CPT

## 2025-09-07 PROCEDURE — 72170 X-RAY EXAM OF PELVIS: CPT

## 2025-09-07 PROCEDURE — 72170 X-RAY EXAM OF PELVIS: CPT | Mod: 26

## 2025-09-07 RX ORDER — ENALAPRIL MALEATE 20 MG
20 TABLET ORAL ONCE
Refills: 0 | Status: COMPLETED | OUTPATIENT
Start: 2025-09-07 | End: 2025-09-07

## 2025-09-07 RX ORDER — AMOXICILLIN AND CLAVULANATE POTASSIUM 500; 125 MG/1; MG/1
1 TABLET, FILM COATED ORAL
Qty: 20 | Refills: 0
Start: 2025-09-07 | End: 2025-09-16

## 2025-09-07 RX ORDER — FLUTICASONE PROPIONATE 50 UG/1
1 SPRAY, METERED NASAL
Qty: 1 | Refills: 0
Start: 2025-09-07 | End: 2025-09-13

## 2025-09-07 RX ADMIN — Medication 20 MILLIGRAM(S): at 17:56
